# Patient Record
Sex: FEMALE | Race: WHITE | NOT HISPANIC OR LATINO | Employment: OTHER | URBAN - METROPOLITAN AREA
[De-identification: names, ages, dates, MRNs, and addresses within clinical notes are randomized per-mention and may not be internally consistent; named-entity substitution may affect disease eponyms.]

---

## 2017-02-08 ENCOUNTER — TELEPHONE (OUTPATIENT)
Dept: FAMILY MEDICINE | Facility: OTHER | Age: 57
End: 2017-02-08

## 2017-02-08 DIAGNOSIS — Z11.59 NEED FOR HEPATITIS C SCREENING TEST: ICD-10-CM

## 2017-02-08 DIAGNOSIS — E03.9 HYPOTHYROIDISM, UNSPECIFIED TYPE: Primary | ICD-10-CM

## 2017-02-08 NOTE — TELEPHONE ENCOUNTER
Reason for call:  Other  Reason for Call: Request for an order or referral:    Order or referral being requested: lab orders for annual lab work.  Needs TSH too.     Date needed: as soon as possible    Has the patient been seen by the PCP for this problem? YES    Additional comments: patient scheduled for her annual exam on 02-21-17 and would like to have her lab work done prior.       Phone number Patient can be reached at:  Home number on file [phone] (home) or Cell number on file:    Telephone Information:   Mobile [phone]       Best Time:  any    Can we leave a detailed message on this number?  YES    Call taken on 2/8/2017 at 7:06 AM by Enma James

## 2017-02-13 ENCOUNTER — HOSPITAL ENCOUNTER (OUTPATIENT)
Dept: MAMMOGRAPHY | Facility: CLINIC | Age: 57
Discharge: HOME | End: 2017-02-13
Attending: FAMILY MEDICINE | Admitting: FAMILY MEDICINE
Payer: COMMERCIAL

## 2017-02-13 DIAGNOSIS — E03.9 HYPOTHYROIDISM, UNSPECIFIED TYPE: ICD-10-CM

## 2017-02-13 DIAGNOSIS — Z12.31 VISIT FOR SCREENING MAMMOGRAM: ICD-10-CM

## 2017-02-13 LAB — TSH SERPL DL<=0.005 MIU/L-ACNC: 1.67 MU/L (ref 0.4–4)

## 2017-02-13 PROCEDURE — 84443 ASSAY THYROID STIM HORMONE: CPT | Performed by: FAMILY MEDICINE

## 2017-02-13 PROCEDURE — G0202 SCR MAMMO BI INCL CAD: HCPCS

## 2017-02-13 PROCEDURE — 36415 COLL VENOUS BLD VENIPUNCTURE: CPT | Performed by: FAMILY MEDICINE

## 2017-02-21 NOTE — PROGRESS NOTES
SUBJECTIVE:     CC: Umm Claros is an 56 year old woman who presents for preventive health visit.     Physical   Annual:     Getting at least 3 servings of Calcium per day::  Yes    Bi-annual eye exam::  Yes    Dental care twice a year::  Yes    Sleep apnea or symptoms of sleep apnea::  None    Diet::  Regular (no restrictions)    Frequency of exercise::  2-3 days/week    Duration of exercise::  45-60 minutes    Taking medications regularly::  Yes    Medication side effects::  None    Additional concerns today::  No        -------------------------------------    Today's PHQ-2 Score:   PHQ-2 ( 1999 [employer]) 3/14/2016   Q1: Little interest or pleasure in doing things 0   Q2: Feeling down, depressed or hopeless 0   PHQ-2 Score 0       Abuse: Current or Past(Physical, Sexual or Emotional)- No  Do you feel safe in your environment - Yes    Social History   Substance Use Topics     Smoking status: Former Smoker     Years: 7.00     Quit date: 4/1/1993     Smokeless tobacco: Never Used     Alcohol use Yes      Comment: 3-5 drinks per week     on occ    Recent Labs   Lab Test  11/21/16   0859  02/16/12   0813   CHOL  191  181   HDL  98  83   LDL  82  82   TRIG  56  78   CHOLHDLRATIO   --   2.0   NHDL  93   --        Reviewed orders with patient.  Reviewed health maintenance and updated orders accordingly - Yes    Mammo Decision Support:  Patient over age 50, mutual decision to screen reflected in health maintenance.    Pertinent mammograms are reviewed under the imaging tab.  History of abnormal Pap smear: NO - age 30-65 PAP every 5 years with negative HPV co-testing recommended  All Histories reviewed and updated in Epic.    Past Medical History   Diagnosis Date     Anxiety state, unspecified      Depressive disorder, not elsewhere classified      depression     Raynaud's syndrome      Tobacco use disorder      Unspecified hypothyroidism       Past Surgical History   Procedure Laterality Date     Hc removal of  ovary/tube(s)  01/30/01     Laparoscopic left sided salpingo-oophorectomy. Cystoscopy     C appendectomy  05/26/00     Laparoscopic drainage of left ovarian cyst and lysis of adhesions, dilatation and curettage, and laparoscopic appendectomy performed by Dr. Ryan      remove tonsils/adenoids,<11 y/o       T & A <12y.o.     Colonoscopy  4/10/2012     Procedure:COLONOSCOPY; Colonoscopy; Surgeon:BELL LAYTON; Location:[facility]       ROS:  Review of Systems   Constitutional: Negative.    HENT: Negative.    Eyes: Negative.    Respiratory: Negative.    Cardiovascular: Negative.    Gastrointestinal: Negative.    Endocrine: Negative.    Genitourinary: Negative.    Musculoskeletal: Negative.    Skin: Negative.    Neurological: Negative.    Hematological: Negative.    Psychiatric/Behavioral: Negative.          Problem list, Medication list, Allergies, and Medical/Social/Surgical histories reviewed in [facility] and updated as appropriate.  Labs reviewed in EPIC  BP Readings from Last 3 Encounters:   02/23/17 110/72   09/29/16 105/73   03/14/16 108/60    Wt Readings from Last 3 Encounters:   02/23/17 115 lb 3.2 oz (52.3 kg)   09/29/16 115 lb (52.2 kg)   03/14/16 116 lb (52.6 kg)                  Patient Active Problem List   Diagnosis     Hypothyroidism     Intradermal nevus     Osteopenia of prematurity     Chronic constipation     Diverticulitis of colon     Past Surgical History   Procedure Laterality Date     Hc removal of ovary/tube(s)  01/30/01     Laparoscopic left sided salpingo-oophorectomy. Cystoscopy     C appendectomy  05/26/00     Laparoscopic drainage of left ovarian cyst and lysis of adhesions, dilatation and curettage, and laparoscopic appendectomy performed by Dr. Ryan      remove tonsils/adenoids,<11 y/o       T & A <12y.o.     Colonoscopy  4/10/2012     Procedure:COLONOSCOPY; Colonoscopy; Surgeon:BELL LAYTON; Location: [facility]       Social History   Substance Use Topics     Smoking status: Former  "Smoker     Years: 7.00     Quit date: 4/1/1993     Smokeless tobacco: Never Used     Alcohol use Yes      Comment: 3-5 drinks per week     Family History   Problem Relation Age of Onset     CANCER Paternal Grandmother      breast cancer     Gynecology Mother      endometriosis     CANCER Father      prostate cancer     Alcohol/Drug Father      alcohol problems and smoker         Current Outpatient Prescriptions   Medication Sig Dispense Refill     amoxicillin-clavulanate (AUGMENTIN) 500-125 MG per tablet Take 1 tablet by mouth 3 times daily 30 tablet 0     levothyroxine (SYNTHROID) 88 MCG tablet Take 1 tablet (88 mcg) by mouth every morning 90 tablet 3     Acetaminophen (TYLENOL PO)        vitamin E 400 UNIT capsule Two times weekly 30 capsule 0     docusate sodium 100 MG tablet Take 100 mg by mouth daily. 60 tablet 1     magnesium hydroxide (MILK OF MAGNESIA) 400 MG/5ML suspension Take 30-60 mLs by mouth daily as needed for constipation. 360 mL 1     IBUPROFEN PO Take 200 mg by mouth as needed.       aspirin 81 MG EC tablet Take 81 mg by mouth daily.       fish oil-omega-3 fatty acids (FISH OIL) 1000 MG capsule Take 1 g by mouth daily.       Magnesium 250 MG tablet Take 1 tablet by mouth daily.       MULTIPLE VITAMIN CAPS   OR daily  0     CALCIUM /VITAMIN D TABS   OR 1 TABLET DAILY 0 0     VITAMIN C TABS 500 MG OR 1 TABLET TWICE DAILY 0 0     Allergies   Allergen Reactions     No Known Allergies      OBJECTIVE:     LMP 04/13/2001  EXAM:   /72  Pulse 76  Temp 98.8  F (37.1  C) (Temporal)  Resp 12  Ht 5' 3.15\" (1.604 m)  Wt 115 lb 3.2 oz (52.3 kg)  LMP 04/13/2001  SpO2 99%  BMI 20.31 kg/m2  Physical Exam   Constitutional: She is oriented to person, place, and time. She appears well-developed and well-nourished.   HENT:   Head: Normocephalic and atraumatic.   Right Ear: External ear normal.   Left Ear: External ear normal.   Mouth/Throat: Oropharynx is clear and moist.   Eyes: EOM are normal.   Neck: Neck " "supple.   Cardiovascular: Normal rate and regular rhythm.    Pulmonary/Chest: Effort normal and breath sounds normal.   Abdominal: Soft. Bowel sounds are normal.   Musculoskeletal: Normal range of motion.   Neurological: She is alert and oriented to person, place, and time.   Psychiatric: She has a normal mood and affect.     ASSESSMENT/PLAN:       Problem List Items Addressed This Visit     Hypothyroidism     Well controlled  Last TSH is well controlled           Relevant Medications    levothyroxine (SYNTHROID) 88 MCG tablet    Osteopenia of prematurity     Discussed calcium and vitamin D         Diverticulitis of colon     Has had recurrent diverticulitis  Will send standing orders for abx in case of any episode  She will notify clinic promptly when she starts using them         Relevant Medications    amoxicillin-clavulanate (AUGMENTIN) 500-125 MG per tablet      Other Visit Diagnoses     Encounter for routine adult health examination without abnormal findings    -  Primary    Need for hepatitis C screening test                COUNSELING:  Reviewed preventive health counseling, as reflected in patient instructions       Regular exercise       Healthy diet/nutrition       Vision screening         reports that she quit smoking about 23 years ago. She quit after 7.00 years of use. She has never used smokeless tobacco.    Estimated body mass index is 19.63 kg/(m^2) as calculated from the following:    Height as of 3/14/16: 5' 4.17\" (1.63 m).    Weight as of 9/29/16: 115 lb (52.2 kg).       Counseling Resources:  ATP IV Guidelines  Pooled Cohorts Equation Calculator  Breast Cancer Risk Calculator  FRAX Risk Assessment  ICSI Preventive Guidelines  Dietary Guidelines for Americans, 2010  USDA's MyPlate  ASA Prophylaxis  Lung CA Screening    Lindsay Padgett MD  [facility]  Answers for HPI/ROS submitted by the patient on 2/22/2017   PHQ-2 Depressed: Not at all, Not at all  PHQ-2 Score: 0      "

## 2017-02-23 ENCOUNTER — OFFICE VISIT (OUTPATIENT)
Dept: FAMILY MEDICINE | Facility: OTHER | Age: 57
End: 2017-02-23
Payer: COMMERCIAL

## 2017-02-23 VITALS
TEMPERATURE: 98.8 F | OXYGEN SATURATION: 99 % | DIASTOLIC BLOOD PRESSURE: 72 MMHG | WEIGHT: 115.2 LBS | HEART RATE: 76 BPM | RESPIRATION RATE: 12 BRPM | BODY MASS INDEX: 20.41 KG/M2 | SYSTOLIC BLOOD PRESSURE: 110 MMHG | HEIGHT: 63 IN

## 2017-02-23 DIAGNOSIS — M85.80 OSTEOPENIA OF PREMATURITY: ICD-10-CM

## 2017-02-23 DIAGNOSIS — E03.9 HYPOTHYROIDISM, UNSPECIFIED TYPE: ICD-10-CM

## 2017-02-23 DIAGNOSIS — Z00.00 ENCOUNTER FOR ROUTINE ADULT HEALTH EXAMINATION WITHOUT ABNORMAL FINDINGS: Primary | ICD-10-CM

## 2017-02-23 DIAGNOSIS — Z11.59 NEED FOR HEPATITIS C SCREENING TEST: ICD-10-CM

## 2017-02-23 DIAGNOSIS — K57.32 DIVERTICULITIS OF COLON: ICD-10-CM

## 2017-02-23 PROCEDURE — 99396 PREV VISIT EST AGE 40-64: CPT | Performed by: FAMILY MEDICINE

## 2017-02-23 PROCEDURE — 36415 COLL VENOUS BLD VENIPUNCTURE: CPT | Performed by: FAMILY MEDICINE

## 2017-02-23 PROCEDURE — 86803 HEPATITIS C AB TEST: CPT | Performed by: FAMILY MEDICINE

## 2017-02-23 RX ORDER — AMOXICILLIN AND CLAVULANATE POTASSIUM 500; 125 MG/1; MG/1
1 TABLET, FILM COATED ORAL 3 TIMES DAILY
Qty: 30 TABLET | Refills: 0 | Status: SHIPPED | OUTPATIENT
Start: 2017-02-23 | End: 2017-02-23

## 2017-02-23 RX ORDER — AMOXICILLIN AND CLAVULANATE POTASSIUM 500; 125 MG/1; MG/1
1 TABLET, FILM COATED ORAL 3 TIMES DAILY
Qty: 30 TABLET | Refills: 0 | Status: SHIPPED | OUTPATIENT
Start: 2017-02-23 | End: 2017-08-03

## 2017-02-23 RX ORDER — LEVOTHYROXINE SODIUM 88 UG/1
88 TABLET ORAL EVERY MORNING
Qty: 90 TABLET | Refills: 3 | Status: SHIPPED | OUTPATIENT
Start: 2017-02-23 | End: 2018-03-17

## 2017-02-23 ASSESSMENT — PAIN SCALES - GENERAL: PAINLEVEL: NO PAIN (0)

## 2017-02-23 NOTE — ASSESSMENT & PLAN NOTE
Has had recurrent diverticulitis  Will send standing orders for abx in case of any episode  She will notify clinic promptly when she starts using them

## 2017-02-23 NOTE — MR AVS SNAPSHOT
"              After Visit Summary   2/23/2017    Umm Claros    MRN: [id number]           Patient Information     Date Of Birth          1960        Visit Information        Provider Department      2/23/2017 9:40 AM Lindsay Padgett MD [facility]        Today's Diagnoses     Diverticulitis of colon    -  1    Hypothyroidism, unspecified type           Follow-ups after your visit        Follow-up notes from your care team     Return for Physical Exam.      Who to contact     If you have questions or need follow up information about today's clinic visit or your schedule please contact [facility] directly at [phone].  Normal or non-critical lab and imaging results will be communicated to you by MyChart, letter or phone within 4 business days after the clinic has received the results. If you do not hear from us within 7 days, please contact the clinic through [employer] or phone. If you have a critical or abnormal lab result, we will notify you by phone as soon as possible.  Submit refill requests through [employer]wu or call your pharmacy and they will forward the refill request to us. Please allow 3 business days for your refill to be completed.          Additional Information About Your Visit        MyChart Information     [employer]wu gives you secure access to your electronic health record. If you see a primary care provider, you can also send messages to your care team and make appointments. If you have questions, please call your primary care clinic.  If you do not have a primary care provider, please call [phone] and they will assist you.        Care EveryWhere ID     This is your Care EveryWhere ID. This could be used by other organizations to access your [facility] medical records  [id number]        Your Vitals Were     Pulse Temperature Respirations Height Last Period Pulse Oximetry    76 98.8  F (37.1  C) (Temporal) 12 5' 3.15\" (1.604 m) 04/13/2001 99%    BMI (Body " Mass Index)                   20.31 kg/m2            Blood Pressure from Last 3 Encounters:   02/23/17 110/72   09/29/16 105/73   03/14/16 108/60    Weight from Last 3 Encounters:   02/23/17 115 lb 3.2 oz (52.3 kg)   09/29/16 115 lb (52.2 kg)   03/14/16 116 lb (52.6 kg)              Today, you had the following     No orders found for display         Today's Medication Changes          These changes are accurate as of: 2/23/17 10:41 AM.  If you have any questions, ask your nurse or doctor.               Start taking these medicines.        Dose/Directions    amoxicillin-clavulanate 500-125 MG per tablet   Commonly known as:  AUGMENTIN   Used for:  Diverticulitis of colon   Started by:  Lindsay Padgett MD        Dose:  1 tablet   Take 1 tablet by mouth 3 times daily   Quantity:  30 tablet   Refills:  0            Where to get your medicines      These medications were sent to [facility] [address]   [address] , [facility] [address]     Phone:  [phone]     levothyroxine 88 MCG tablet         These medications were sent to [facility] [address] [place] [address] [address] [facility] [address]  [address] [facility] [address]     Phone:  [phone]     amoxicillin-clavulanate 500-125 MG per tablet                Primary Care Provider Office Phone # Fax #    Lindsay Padgett [phone]       [facility] [address]    [facility] [address]        Thank you!     Thank you for choosing [facility]  for your care. Our goal is always to provide you with excellent care. Hearing back from our patients is one way we can continue to improve our services. Please take a few minutes to complete the written survey that you may receive in the mail after your visit with us. Thank you!             Your Updated Medication List - Protect others around you: Learn how to safely use, store and throw away  your medicines at www.disposemymeds.org.          This list is accurate as of: 2/23/17 10:41 AM.  Always use your most recent med list.                   Brand Name Dispense Instructions for use    amoxicillin-clavulanate 500-125 MG per tablet    AUGMENTIN    30 tablet    Take 1 tablet by mouth 3 times daily       ascorbic acid 500 MG tablet    VITAMIN C    0    1 TABLET TWICE DAILY       aspirin 81 MG EC tablet      Take 81 mg by mouth daily.       CALCIUM /VITAMIN D TABS   OR     0    1 TABLET DAILY       docusate sodium 100 MG tablet    COLACE    60 tablet    Take 100 mg by mouth daily.       fish oil-omega-3 fatty acids 1000 MG capsule      Take 1 g by mouth daily.       IBUPROFEN PO      Take 200 mg by mouth as needed.       levothyroxine 88 MCG tablet    SYNTHROID    90 tablet    Take 1 tablet (88 mcg) by mouth every morning       magnesium 250 MG tablet      Take 1 tablet by mouth daily.       MILK OF MAGNESIA 400 MG/5ML suspension   Generic drug:  magnesium hydroxide     360 mL    Take 30-60 mLs by mouth daily as needed for constipation.       Multiple vitamin Caps      daily       TYLENOL PO          vitamin E 400 UNIT capsule     30 capsule    Two times weekly

## 2017-02-23 NOTE — NURSING NOTE
"Chief Complaint   Patient presents with     Physical     Panel Management     flu, honoring choices, hep C       Initial /72  Pulse 76  Temp 98.8  F (37.1  C) (Temporal)  Resp 12  Ht 5' 3.15\" (1.604 m)  Wt 115 lb 3.2 oz (52.3 kg)  LMP 04/13/2001  SpO2 99%  BMI 20.31 kg/m2 Estimated body mass index is 20.31 kg/(m^2) as calculated from the following:    Height as of this encounter: 5' 3.15\" (1.604 m).    Weight as of this encounter: 115 lb 3.2 oz (52.3 kg).  Medication Reconciliation: complete  Fiorella Bonds CMA (AAMA)    "

## 2017-02-24 LAB — HCV AB SERPL QL IA: NORMAL

## 2017-02-27 ASSESSMENT — ENCOUNTER SYMPTOMS
EYES NEGATIVE: 1
ENDOCRINE NEGATIVE: 1
MUSCULOSKELETAL NEGATIVE: 1
RESPIRATORY NEGATIVE: 1
CARDIOVASCULAR NEGATIVE: 1
HEMATOLOGIC/LYMPHATIC NEGATIVE: 1
CONSTITUTIONAL NEGATIVE: 1
GASTROINTESTINAL NEGATIVE: 1
NEUROLOGICAL NEGATIVE: 1
PSYCHIATRIC NEGATIVE: 1

## 2017-03-07 DIAGNOSIS — E03.9 HYPOTHYROIDISM: ICD-10-CM

## 2017-03-07 NOTE — TELEPHONE ENCOUNTER
Levothyroxine 88 mcg     Last Written Prescription Date: 02/23/2017  Last Quantity: 90, # refills: 3  Last Office Visit with G, P or [facility] prescribing provider: 2/23/2017        TSH   Date Value Ref Range Status   02/13/2017 1.67 0.40 - 4.00 mU/L Final

## 2017-03-08 RX ORDER — LEVOTHYROXINE SODIUM 88 UG/1
TABLET ORAL
Qty: 90 TABLET | Refills: 3 | OUTPATIENT
Start: 2017-03-08

## 2017-07-31 ENCOUNTER — DOCUMENTATION ONLY (OUTPATIENT)
Dept: OTHER | Facility: CLINIC | Age: 57
End: 2017-07-31

## 2017-07-31 PROBLEM — Z71.89 ADVANCED DIRECTIVES, COUNSELING/DISCUSSION: Chronic | Status: ACTIVE | Noted: 2017-07-31

## 2017-07-31 NOTE — PROGRESS NOTES
SUBJECTIVE:                                                    Umm Claros is a 56 year old female who presents to clinic today for the following health issues:      HPI    Concern - Diverticulitis  Onset: Since last September    Description:   diverticulitis    Intensity: mild    Progression of Symptoms:  Improving with antibiotics    Accompanying Signs & Symptoms:  Upper back pain going down the leg    Previous history of similar problem:   Yes    Precipitating factors:   Worsened by: stress, sometimes nothing, certain foods, heavy meals    Alleviating factors:  Improved by: abx    Therapies Tried and outcome: Augmentin currently      Problem list and histories reviewed & adjusted, as indicated.  Additional history: as documented        Patient Active Problem List   Diagnosis     Hypothyroidism     Intradermal nevus     Osteopenia of prematurity     Chronic constipation     Diverticulitis of colon     Advance Care Planning     Past Surgical History:   Procedure Laterality Date     C APPENDECTOMY  05/26/00    Laparoscopic drainage of left ovarian cyst and lysis of adhesions, dilatation and curettage, and laparoscopic appendectomy performed by Dr. Ryan     COLONOSCOPY  4/10/2012    Procedure:COLONOSCOPY; Colonoscopy; Surgeon:BELL LAYTON; Location:[facility]     HC REMOVAL OF OVARY/TUBE(S)  01/30/01    Laparoscopic left sided salpingo-oophorectomy. Cystoscopy     HC REMOVE TONSILS/ADENOIDS,<11 Y/O      T & A <12y.o.       Social History   Substance Use Topics     Smoking status: Former Smoker     Years: 7.00     Quit date: 4/1/1993     Smokeless tobacco: Never Used     Alcohol use Yes      Comment: 3-5 drinks per week     Family History   Problem Relation Age of Onset     CANCER Paternal Grandmother      breast cancer     Gynecology Mother      endometriosis     CANCER Father      prostate cancer     Alcohol/Drug Father      alcohol problems and smoker         Current Outpatient Prescriptions   Medication  Sig Dispense Refill     amoxicillin-clavulanate (AUGMENTIN) 500-125 MG per tablet Take 1 tablet by mouth 3 times daily 30 tablet 0     levothyroxine (SYNTHROID) 88 MCG tablet Take 1 tablet (88 mcg) by mouth every morning 90 tablet 3     Acetaminophen (TYLENOL PO)        vitamin E 400 UNIT capsule Two times weekly 30 capsule 0     docusate sodium 100 MG tablet Take 100 mg by mouth daily. 60 tablet 1     magnesium hydroxide (MILK OF MAGNESIA) 400 MG/5ML suspension Take 30-60 mLs by mouth daily as needed for constipation. 360 mL 1     IBUPROFEN PO Take 200 mg by mouth as needed.       aspirin 81 MG EC tablet Take 81 mg by mouth daily.       fish oil-omega-3 fatty acids (FISH OIL) 1000 MG capsule Take 1 g by mouth daily.       Magnesium 250 MG tablet Take 1 tablet by mouth daily.       MULTIPLE VITAMIN CAPS   OR daily  0     CALCIUM /VITAMIN D TABS   OR 1 TABLET DAILY 0 0     VITAMIN C TABS 500 MG OR 1 TABLET TWICE DAILY 0 0     Allergies   Allergen Reactions     No Known Allergies      Tramadol      Other reaction(s): Other - Describe In Comment Field  Dizzy and double vision     BP Readings from Last 3 Encounters:   08/03/17 120/68   02/23/17 110/72   09/29/16 105/73    Wt Readings from Last 3 Encounters:   08/03/17 114 lb (51.7 kg)   02/23/17 115 lb 3.2 oz (52.3 kg)   09/29/16 115 lb (52.2 kg)                  Labs reviewed in EPIC        ROS:  Constitutional, HEENT, cardiovascular, pulmonary, GI, , musculoskeletal, neuro, skin, endocrine and psych systems are negative, except as otherwise noted.      OBJECTIVE:   /68 (BP Location: Right arm, Patient Position: Chair, Cuff Size: Adult Regular)  Pulse 61  Temp 97.4  F (36.3  C) (Temporal)  Resp 16  Wt 114 lb (51.7 kg)  LMP 04/13/2001  SpO2 100%  BMI 20.1 kg/m2  Body mass index is 20.1 kg/(m^2).   Physical Exam   Constitutional: She is oriented to person, place, and time. She appears well-developed and well-nourished.   HENT:   Head: Normocephalic and  atraumatic.   Eyes: EOM are normal.   Neck: Neck supple.   Cardiovascular: Normal rate, regular rhythm and normal heart sounds.    Pulmonary/Chest: Effort normal and breath sounds normal.   Abdominal: Soft. Bowel sounds are normal. She exhibits no distension and no mass. There is tenderness. There is no rebound and no guarding.   Neurological: She is alert and oriented to person, place, and time.   Psychiatric: She has a normal mood and affect.         Diagnostic Test Results:  none     ASSESSMENT/PLAN:     Problem List Items Addressed This Visit     Diverticulitis of colon     She has had 4 episodes of diverticulitis so far and has been worried about having a complication from it  Symptoms have improved since starting the standing order for augmentin  Refer to GI per Pt's request to discuss her options for further treatment to prevent these attacks  Discussed dietary modifications   Discussed home care  Reportable signs and symptoms discussed  RTC if symptoms persist or fail to improve           Relevant Medications    amoxicillin-clavulanate (AUGMENTIN) 500-125 MG per tablet      Other Visit Diagnoses     Diverticulitis of large intestine without perforation or abscess without bleeding    -  Primary    Relevant Medications    amoxicillin-clavulanate (AUGMENTIN) 500-125 MG per tablet    Other Relevant Orders    GASTROENTEROLOGY ADULT REF CONSULT ONLY           Lindsay Padgett MD  [facility]

## 2017-08-03 ENCOUNTER — OFFICE VISIT (OUTPATIENT)
Dept: FAMILY MEDICINE | Facility: OTHER | Age: 57
End: 2017-08-03
Payer: COMMERCIAL

## 2017-08-03 VITALS
WEIGHT: 114 LBS | SYSTOLIC BLOOD PRESSURE: 120 MMHG | HEART RATE: 61 BPM | RESPIRATION RATE: 16 BRPM | OXYGEN SATURATION: 100 % | DIASTOLIC BLOOD PRESSURE: 68 MMHG | BODY MASS INDEX: 20.1 KG/M2 | TEMPERATURE: 97.4 F

## 2017-08-03 DIAGNOSIS — K57.32 DIVERTICULITIS OF COLON: ICD-10-CM

## 2017-08-03 DIAGNOSIS — K57.32 DIVERTICULITIS OF LARGE INTESTINE WITHOUT PERFORATION OR ABSCESS WITHOUT BLEEDING: Primary | ICD-10-CM

## 2017-08-03 PROCEDURE — 99214 OFFICE O/P EST MOD 30 MIN: CPT | Performed by: FAMILY MEDICINE

## 2017-08-03 RX ORDER — AMOXICILLIN AND CLAVULANATE POTASSIUM 500; 125 MG/1; MG/1
1 TABLET, FILM COATED ORAL 3 TIMES DAILY
Qty: 30 TABLET | Refills: 0 | Status: SHIPPED | OUTPATIENT
Start: 2017-08-03 | End: 2017-11-06

## 2017-08-03 ASSESSMENT — PAIN SCALES - GENERAL: PAINLEVEL: NO PAIN (1)

## 2017-08-03 NOTE — ASSESSMENT & PLAN NOTE
She has had 4 episodes of diverticulitis so far and has been worried about having a complication from it  Symptoms have improved since starting the standing order for augmentin  Refer to GI per Pt's request to discuss her options for further treatment to prevent these attacks  Discussed dietary modifications   Discussed home care  Reportable signs and symptoms discussed  RTC if symptoms persist or fail to improve

## 2017-08-03 NOTE — NURSING NOTE
"Chief Complaint   Patient presents with     Gastrointestinal Problem     diverticulitis     Panel Management     UTD       Initial /68 (BP Location: Right arm, Patient Position: Chair, Cuff Size: Adult Regular)  Pulse 61  Temp 97.4  F (36.3  C) (Temporal)  Resp 16  Wt 114 lb (51.7 kg)  LMP 04/13/2001  SpO2 100%  BMI 20.1 kg/m2 Estimated body mass index is 20.1 kg/(m^2) as calculated from the following:    Height as of 2/23/17: 5' 3.15\" (1.604 m).    Weight as of this encounter: 114 lb (51.7 kg).  Medication Reconciliation: complete     Marietta Ventura CMA      "

## 2017-08-03 NOTE — MR AVS SNAPSHOT
After Visit Summary   8/3/2017    Umm Claros    MRN: [id number]           Patient Information     Date Of Birth          1960        Visit Information        Provider Department      8/3/2017 11:00 AM Lindsay Padgett MD [facility]        Today's Diagnoses     Diverticulitis of large intestine without perforation or abscess without bleeding    -  1    Diverticulitis of colon           Follow-ups after your visit        Additional Services     GASTROENTEROLOGY ADULT REF CONSULT ONLY       Preferred Location: [facility] [place] [phone]      Please be aware that coverage of these services is subject to the terms and limitations of your health insurance plan.  Call member services at your health plan with any benefit or coverage questions.  Any procedures must be performed at a [place] facility OR coordinated by your clinic's referral office.    Please bring the following with you to your appointment:    (1) Any X-Rays, CTs or MRIs which have been performed.  Contact the facility where they were done to arrange for  prior to your scheduled appointment.    (2) List of current medications   (3) This referral request   (4) Any documents/labs given to you for this referral                  Who to contact     If you have questions or need follow up information about today's clinic visit or your schedule please contact [facility] directly at [phone].  Normal or non-critical lab and imaging results will be communicated to you by MyChart, letter or phone within 4 business days after the clinic has received the results. If you do not hear from us within 7 days, please contact the clinic through MyChart or phone. If you have a critical or abnormal lab result, we will notify you by phone as soon as possible.  Submit refill requests through [employer] or call your pharmacy and they will forward the refill request to us. Please allow 3 business days for your  refill to be completed.          Additional Information About Your Visit        MyChart Information     [employer] gives you secure access to your electronic health record. If you see a primary care provider, you can also send messages to your care team and make appointments. If you have questions, please call your primary care clinic.  If you do not have a primary care provider, please call [phone] and they will assist you.        Care EveryWhere ID     This is your Care EveryWhere ID. This could be used by other organizations to access your [place] medical records  [id number]        Your Vitals Were     Pulse Temperature Respirations Last Period Pulse Oximetry BMI (Body Mass Index)    61 97.4  F (36.3  C) (Temporal) 16 04/13/2001 100% 20.1 kg/m2       Blood Pressure from Last 3 Encounters:   08/03/17 120/68   02/23/17 110/72   09/29/16 105/73    Weight from Last 3 Encounters:   08/03/17 114 lb (51.7 kg)   02/23/17 115 lb 3.2 oz (52.3 kg)   09/29/16 115 lb (52.2 kg)              We Performed the Following     GASTROENTEROLOGY ADULT REF CONSULT ONLY          Where to get your medicines      These medications were sent to [facility] [place], [place] - [address]   [address] , [facility] [address]     Phone:  [phone]     amoxicillin-clavulanate 500-125 MG per tablet          Primary Care Provider Office Phone # Fax #    Lindsay Padgett [phone]       [address] [address]        Equal Access to Services     [facility] : Hadii jean-paul ku hadasho Soomaali, waaxda luqadaha, qaybta kaalmada adecooper, kenneth ma . So [facility] [phone].    ATENCIÓN: Si habla español, tiene a newman disposición servicios gratuitos de asistencia lingüística. Llame al [phone].    We comply with applicable federal civil rights laws and [place] laws. We do not discriminate on the basis of race, color, national  origin, age, disability sex, sexual orientation or gender identity.            Thank you!     Thank you for choosing [facility]  for your care. Our goal is always to provide you with excellent care. Hearing back from our patients is one way we can continue to improve our services. Please take a few minutes to complete the written survey that you may receive in the mail after your visit with us. Thank you!             Your Updated Medication List - Protect others around you: Learn how to safely use, store and throw away your medicines at www.disposemymeds.org.          This list is accurate as of: 8/3/17 11:38 AM.  Always use your most recent med list.                   Brand Name Dispense Instructions for use Diagnosis    amoxicillin-clavulanate 500-125 MG per tablet    AUGMENTIN    30 tablet    Take 1 tablet by mouth 3 times daily    Diverticulitis of colon       ascorbic acid 500 MG tablet    VITAMIN C    0    1 TABLET TWICE DAILY        aspirin 81 MG EC tablet      Take 81 mg by mouth daily.        CALCIUM /VITAMIN D TABS   OR     0    1 TABLET DAILY        docusate sodium 100 MG tablet    COLACE    60 tablet    Take 100 mg by mouth daily.    LLQ abdominal pain, Diverticulosis of colon       fish oil-omega-3 fatty acids 1000 MG capsule      Take 1 g by mouth daily.        IBUPROFEN PO      Take 200 mg by mouth as needed.        levothyroxine 88 MCG tablet    SYNTHROID    90 tablet    Take 1 tablet (88 mcg) by mouth every morning    Hypothyroidism, unspecified type       magnesium 250 MG tablet      Take 1 tablet by mouth daily.        MILK OF MAGNESIA 400 MG/5ML suspension   Generic drug:  magnesium hydroxide     360 mL    Take 30-60 mLs by mouth daily as needed for constipation.    LLQ abdominal pain, Diverticulosis of colon       Multiple vitamin Caps      daily        TYLENOL PO           vitamin E 400 UNIT capsule     30 capsule    Two times weekly

## 2017-08-07 ENCOUNTER — HOSPITAL ENCOUNTER (EMERGENCY)
Facility: CLINIC | Age: 57
Discharge: HOME | End: 2017-08-07
Attending: FAMILY MEDICINE | Admitting: FAMILY MEDICINE
Payer: COMMERCIAL

## 2017-08-07 ENCOUNTER — APPOINTMENT (OUTPATIENT)
Dept: CT IMAGING | Facility: CLINIC | Age: 57
End: 2017-08-07
Attending: FAMILY MEDICINE
Payer: COMMERCIAL

## 2017-08-07 VITALS
OXYGEN SATURATION: 99 % | SYSTOLIC BLOOD PRESSURE: 118 MMHG | RESPIRATION RATE: 18 BRPM | BODY MASS INDEX: 20.1 KG/M2 | WEIGHT: 114 LBS | DIASTOLIC BLOOD PRESSURE: 79 MMHG | HEART RATE: 75 BPM | TEMPERATURE: 98.4 F

## 2017-08-07 DIAGNOSIS — R10.9 LEFT LATERAL ABDOMINAL PAIN: ICD-10-CM

## 2017-08-07 DIAGNOSIS — K57.30 DIVERTICULOSIS OF LARGE INTESTINE WITHOUT HEMORRHAGE: ICD-10-CM

## 2017-08-07 DIAGNOSIS — K59.09 CHRONIC CONSTIPATION: ICD-10-CM

## 2017-08-07 LAB
ALBUMIN SERPL-MCNC: 4.1 G/DL (ref 3.4–5)
ALP SERPL-CCNC: 66 U/L (ref 40–150)
ALT SERPL W P-5'-P-CCNC: 19 U/L (ref 0–50)
ANION GAP SERPL CALCULATED.3IONS-SCNC: 8 MMOL/L (ref 3–14)
AST SERPL W P-5'-P-CCNC: 21 U/L (ref 0–45)
BASOPHILS # BLD AUTO: 0.1 10E9/L (ref 0–0.2)
BASOPHILS NFR BLD AUTO: 0.8 %
BILIRUB SERPL-MCNC: 0.6 MG/DL (ref 0.2–1.3)
BUN SERPL-MCNC: 15 MG/DL (ref 7–30)
CALCIUM SERPL-MCNC: 8.8 MG/DL (ref 8.5–10.1)
CHLORIDE SERPL-SCNC: 104 MMOL/L (ref 94–109)
CO2 SERPL-SCNC: 29 MMOL/L (ref 20–32)
CREAT SERPL-MCNC: 0.78 MG/DL (ref 0.52–1.04)
DIFFERENTIAL METHOD BLD: NORMAL
EOSINOPHIL # BLD AUTO: 0 10E9/L (ref 0–0.7)
EOSINOPHIL NFR BLD AUTO: 0.2 %
ERYTHROCYTE [DISTWIDTH] IN BLOOD BY AUTOMATED COUNT: 12 % (ref 10–15)
GFR SERPL CREATININE-BSD FRML MDRD: 76 ML/MIN/1.7M2
GLUCOSE SERPL-MCNC: 95 MG/DL (ref 70–99)
HCT VFR BLD AUTO: 45 % (ref 35–47)
HGB BLD-MCNC: 14.9 G/DL (ref 11.7–15.7)
IMM GRANULOCYTES # BLD: 0 10E9/L (ref 0–0.4)
IMM GRANULOCYTES NFR BLD: 0.2 %
LIPASE SERPL-CCNC: 217 U/L (ref 73–393)
LYMPHOCYTES # BLD AUTO: 1.3 10E9/L (ref 0.8–5.3)
LYMPHOCYTES NFR BLD AUTO: 19.3 %
MCH RBC QN AUTO: 32.1 PG (ref 26.5–33)
MCHC RBC AUTO-ENTMCNC: 33.1 G/DL (ref 31.5–36.5)
MCV RBC AUTO: 97 FL (ref 78–100)
MONOCYTES # BLD AUTO: 0.4 10E9/L (ref 0–1.3)
MONOCYTES NFR BLD AUTO: 6.2 %
NEUTROPHILS # BLD AUTO: 4.8 10E9/L (ref 1.6–8.3)
NEUTROPHILS NFR BLD AUTO: 73.3 %
PLATELET # BLD AUTO: 225 10E9/L (ref 150–450)
POTASSIUM SERPL-SCNC: 4.2 MMOL/L (ref 3.4–5.3)
PROT SERPL-MCNC: 7.9 G/DL (ref 6.8–8.8)
RBC # BLD AUTO: 4.64 10E12/L (ref 3.8–5.2)
SODIUM SERPL-SCNC: 141 MMOL/L (ref 133–144)
WBC # BLD AUTO: 6.5 10E9/L (ref 4–11)

## 2017-08-07 PROCEDURE — 99285 EMERGENCY DEPT VISIT HI MDM: CPT | Mod: Z6 | Performed by: FAMILY MEDICINE

## 2017-08-07 PROCEDURE — 99285 EMERGENCY DEPT VISIT HI MDM: CPT | Mod: 25 | Performed by: FAMILY MEDICINE

## 2017-08-07 PROCEDURE — 96361 HYDRATE IV INFUSION ADD-ON: CPT | Performed by: FAMILY MEDICINE

## 2017-08-07 PROCEDURE — 85025 COMPLETE CBC W/AUTO DIFF WBC: CPT | Performed by: FAMILY MEDICINE

## 2017-08-07 PROCEDURE — 83690 ASSAY OF LIPASE: CPT | Performed by: FAMILY MEDICINE

## 2017-08-07 PROCEDURE — 25000125 ZZHC RX 250: Performed by: FAMILY MEDICINE

## 2017-08-07 PROCEDURE — 25000128 H RX IP 250 OP 636: Performed by: FAMILY MEDICINE

## 2017-08-07 PROCEDURE — 80053 COMPREHEN METABOLIC PANEL: CPT | Performed by: FAMILY MEDICINE

## 2017-08-07 PROCEDURE — 74177 CT ABD & PELVIS W/CONTRAST: CPT

## 2017-08-07 PROCEDURE — 96360 HYDRATION IV INFUSION INIT: CPT | Mod: 59 | Performed by: FAMILY MEDICINE

## 2017-08-07 RX ORDER — ONDANSETRON 2 MG/ML
4 INJECTION INTRAMUSCULAR; INTRAVENOUS EVERY 30 MIN PRN
Status: DISCONTINUED | OUTPATIENT
Start: 2017-08-07 | End: 2017-08-07 | Stop reason: HOSPADM

## 2017-08-07 RX ORDER — SODIUM CHLORIDE 9 MG/ML
1000 INJECTION, SOLUTION INTRAVENOUS CONTINUOUS
Status: DISCONTINUED | OUTPATIENT
Start: 2017-08-07 | End: 2017-08-07 | Stop reason: HOSPADM

## 2017-08-07 RX ORDER — HYDROMORPHONE HYDROCHLORIDE 1 MG/ML
0.5 INJECTION, SOLUTION INTRAMUSCULAR; INTRAVENOUS; SUBCUTANEOUS
Status: DISCONTINUED | OUTPATIENT
Start: 2017-08-07 | End: 2017-08-07 | Stop reason: HOSPADM

## 2017-08-07 RX ORDER — IOPAMIDOL 755 MG/ML
500 INJECTION, SOLUTION INTRAVASCULAR ONCE
Status: COMPLETED | OUTPATIENT
Start: 2017-08-07 | End: 2017-08-07

## 2017-08-07 RX ORDER — LIDOCAINE 40 MG/G
CREAM TOPICAL
Status: DISCONTINUED | OUTPATIENT
Start: 2017-08-07 | End: 2017-08-07 | Stop reason: HOSPADM

## 2017-08-07 RX ADMIN — SODIUM CHLORIDE 1000 ML: 9 INJECTION, SOLUTION INTRAVENOUS at 12:31

## 2017-08-07 RX ADMIN — SODIUM CHLORIDE 60 ML: 9 INJECTION, SOLUTION INTRAVENOUS at 13:14

## 2017-08-07 RX ADMIN — IOPAMIDOL 66 ML: 755 INJECTION, SOLUTION INTRAVENOUS at 13:14

## 2017-08-07 ASSESSMENT — ENCOUNTER SYMPTOMS
BACK PAIN: 1
NAUSEA: 0
FLANK PAIN: 1
HEMATURIA: 0
CHILLS: 1
ABDOMINAL PAIN: 1
VOMITING: 0
DYSURIA: 0
BLOOD IN STOOL: 0
TREMORS: 1
APPETITE CHANGE: 1
DIARRHEA: 1
FREQUENCY: 0
DIFFICULTY URINATING: 0

## 2017-08-07 NOTE — ED PROVIDER NOTES
History     Chief Complaint   Patient presents with     Abdominal Pain     The history is provided by the patient.     Umm Claros is a 56 year old female, with PMHx of diverticulitis, who presents to the ED with complaints of abdominal pain. The patient states that she has been on antibiotics for 10 days now for diverticulitis, but they do not seem to be helping her symptoms much. She reports that at her appointment on Thursday she felt like she was recovering, but now today she began not feeling well again. She endorses that she has been experiencing left abdominal/flank pain and also pain across her mid back below her ribs. The patient states that today she has been shaky and chilled along with some bowel cramping earlier this morning. She reports having some mild diarrhea today and being unable to eat what she wants, when she wants. She says that she has been eating less and less since these symptoms began approximately one month ago. She denies any nausea, vomiting, blood in stool, urinary symptoms and cardiac problems. The patient states that she has had her appendix and left ovary removed.     She was seen in clinic on August 3, 6 days ago, for follow up of diverticulitis. Here is part of that note:  Diverticulitis of colon        She has had 4 episodes of diverticulitis so far and has been worried about having a complication from it  Symptoms have improved since starting the standing order for augmentin  Refer to GI per Pt's request to discuss her options for further treatment to prevent these attacks  Discussed dietary modifications   Discussed home care  Reportable signs and symptoms discussed  RTC if symptoms persist or fail to improve        Triage Note:  Pt here with abdominal; pain .  Hx of diverticulitis, took abx for last two weeks but does not seem to be helping.     I have reviewed the Medications, Allergies, Past Medical and Surgical History, and Social History in the Epic system.    Patient  Active Problem List   Diagnosis     Hypothyroidism     Intradermal nevus     Osteopenia of prematurity     Chronic constipation     Diverticulitis of colon     Advance Care Planning     Past Medical History:   Diagnosis Date     Anxiety state, unspecified      Depressive disorder, not elsewhere classified     depression     Raynaud's syndrome      Tobacco use disorder      Unspecified hypothyroidism      Past Surgical History:   Procedure Laterality Date     C APPENDECTOMY  05/26/00    Laparoscopic drainage of left ovarian cyst and lysis of adhesions, dilatation and curettage, and laparoscopic appendectomy performed by Dr. Ryan     COLONOSCOPY  4/10/2012    Procedure:COLONOSCOPY; Colonoscopy; Surgeon:BELL LAYTON; Location:[facility]     HC REMOVAL OF OVARY/TUBE(S)  01/30/01    Laparoscopic left sided salpingo-oophorectomy. Cystoscopy     HC REMOVE TONSILS/ADENOIDS,<13 Y/O      T & A <12y.o.     Family History   Problem Relation Age of Onset     CANCER Paternal Grandmother      breast cancer     Gynecology Mother      endometriosis     CANCER Father      prostate cancer     Alcohol/Drug Father      alcohol problems and smoker     Social History   Substance Use Topics     Smoking status: Former Smoker     Years: 7.00     Quit date: 4/1/1993     Smokeless tobacco: Never Used     Alcohol use Yes      Comment: 3-5 drinks per week      Immunization History   Administered Date(s) Administered     Influenza (IIV3) 11/14/2001, 11/28/2003, 11/16/2005, 12/04/2006, 09/16/2009, 11/02/2012     Influenza Vaccine IM 3yrs+ 4 Valent IIV4 11/18/2014, 11/18/2015     Influenza Vaccine, 3 YRS +, IM (QUADRIVALENT W/PRESERVATIVES) 11/04/2013     Mantoux 09/16/2009     TD (ADULT, 7+) 06/20/1990, 10/03/2005     TDAP Vaccine (Adacel) 05/04/2015     Allergies   Allergen Reactions     No Known Allergies      Tramadol      Other reaction(s): Other - Describe In Comment Field  Dizzy and double vision     Current Outpatient Prescriptions    Medication Sig Dispense Refill     amoxicillin-clavulanate (AUGMENTIN) 500-125 MG per tablet Take 1 tablet by mouth 3 times daily 30 tablet 0     levothyroxine (SYNTHROID) 88 MCG tablet Take 1 tablet (88 mcg) by mouth every morning 90 tablet 3     Acetaminophen (TYLENOL PO)        vitamin E 400 UNIT capsule Two times weekly 30 capsule 0     docusate sodium 100 MG tablet Take 100 mg by mouth daily. 60 tablet 1     IBUPROFEN PO Take 200 mg by mouth as needed.       aspirin 81 MG EC tablet Take 81 mg by mouth daily.       fish oil-omega-3 fatty acids (FISH OIL) 1000 MG capsule Take 1 g by mouth daily.       Magnesium 250 MG tablet Take 1 tablet by mouth daily.       MULTIPLE VITAMIN CAPS   OR daily  0     CALCIUM /VITAMIN D TABS   OR 1 TABLET DAILY 0 0     VITAMIN C TABS 500 MG OR 1 TABLET TWICE DAILY 0 0     magnesium hydroxide (MILK OF MAGNESIA) 400 MG/5ML suspension Take 30-60 mLs by mouth daily as needed for constipation. 360 mL 1     Review of Systems   Constitutional: Positive for appetite change and chills.   Gastrointestinal: Positive for abdominal pain and diarrhea. Negative for blood in stool, nausea and vomiting.   Genitourinary: Positive for flank pain. Negative for difficulty urinating, dysuria, frequency, hematuria and urgency.   Musculoskeletal: Positive for back pain.   Neurological: Positive for tremors.   All other systems reviewed and are negative.    Physical Exam   BP: 121/90  Pulse: 91  Temp: 98.4  F (36.9  C)  Resp: 14  Weight: 51.7 kg (114 lb)    Physical Exam   Constitutional: She is oriented to person, place, and time. She appears well-developed and well-nourished.   HENT:   Head: Normocephalic and atraumatic.   Right Ear: External ear normal.   Left Ear: External ear normal.   Nose: Nose normal.   Mouth/Throat: Oropharynx is clear and moist.   Eyes: Conjunctivae and EOM are normal.   Neck: Normal range of motion. Neck supple.   Cardiovascular: Normal rate, regular rhythm, normal heart  sounds and intact distal pulses.    No murmur heard.  Pulmonary/Chest: Effort normal and breath sounds normal. No respiratory distress. She has no wheezes. She has no rales.   Abdominal: Soft. Bowel sounds are normal. There is tenderness in the left upper quadrant. There is CVA tenderness.   Musculoskeletal: She exhibits no edema or tenderness.   Neurological: She is alert and oriented to person, place, and time.   Skin: Skin is warm and dry. No rash noted. No erythema.   Psychiatric: She has a normal mood and affect. Her behavior is normal. Judgment and thought content normal.   Nursing note and vitals reviewed.    ED Course     ED Course     Procedures      Results for orders placed or performed during the hospital encounter of 08/07/17 (from the past 24 hour(s))   CBC with platelets differential   Result Value Ref Range    WBC 6.5 4.0 - 11.0 10e9/L    RBC Count 4.64 3.8 - 5.2 10e12/L    Hemoglobin 14.9 11.7 - 15.7 g/dL    Hematocrit 45.0 35.0 - 47.0 %    MCV 97 78 - 100 fl    MCH 32.1 26.5 - 33.0 pg    MCHC 33.1 31.5 - 36.5 g/dL    RDW 12.0 10.0 - 15.0 %    Platelet Count 225 150 - 450 10e9/L    Diff Method Automated Method     % Neutrophils 73.3 %    % Lymphocytes 19.3 %    % Monocytes 6.2 %    % Eosinophils 0.2 %    % Basophils 0.8 %    % Immature Granulocytes 0.2 %    Absolute Neutrophil 4.8 1.6 - 8.3 10e9/L    Absolute Lymphocytes 1.3 0.8 - 5.3 10e9/L    Absolute Monocytes 0.4 0.0 - 1.3 10e9/L    Absolute Eosinophils 0.0 0.0 - 0.7 10e9/L    Absolute Basophils 0.1 0.0 - 0.2 10e9/L    Abs Immature Granulocytes 0.0 0 - 0.4 10e9/L   Comprehensive metabolic panel   Result Value Ref Range    Sodium 141 133 - 144 mmol/L    Potassium 4.2 3.4 - 5.3 mmol/L    Chloride 104 94 - 109 mmol/L    Carbon Dioxide 29 20 - 32 mmol/L    Anion Gap 8 3 - 14 mmol/L    Glucose 95 70 - 99 mg/dL    Urea Nitrogen 15 7 - 30 mg/dL    Creatinine 0.78 0.52 - 1.04 mg/dL    GFR Estimate 76 >60 mL/min/1.7m2    GFR Estimate If Black  >90   GFR Calc   >60 mL/min/1.7m2    Calcium 8.8 8.5 - 10.1 mg/dL    Bilirubin Total 0.6 0.2 - 1.3 mg/dL    Albumin 4.1 3.4 - 5.0 g/dL    Protein Total 7.9 6.8 - 8.8 g/dL    Alkaline Phosphatase 66 40 - 150 U/L    ALT 19 0 - 50 U/L    AST 21 0 - 45 U/L   Lipase   Result Value Ref Range    Lipase 217 73 - 393 U/L   CT Abdomen Pelvis w Contrast    Narrative    CT ABDOMEN AND PELVIS WITH CONTRAST   8/7/2017 1:26 PM     HISTORY: Left-sided abdominal pain.    TECHNIQUE:  CT abdomen and pelvis with 66 mL, Isovue-370 IV. Radiation  dose for this scan was reduced using automated exposure control,  adjustment of the mA and/or kV according to patient size, or iterative  reconstruction technique.    COMPARISON: CT abdomen and pelvis 9/29/2016.    FINDINGS: Distal colonic diverticulosis without convincing  diverticulitis. No bowel obstruction. Small fluid distends multiple  small bowel loops. Appendix not clearly seen. There is some surgical  changes in the region of the cecum. There is small free pelvic fluid.  Ill-defined uterine calcifications could represent underlying uterine  fibroids that are difficult to see, stable. Normal abdominal aorta.  Liver, gallbladder, adrenals, spleen, pancreas, and kidneys do not  show any acute abnormalities. There are a few tiny hypodensities in  the liver that are too small for characterization but appear to be  stable. This could just be small cysts.      Impression    IMPRESSION:  1. No convincing acute abnormality is identified.  2. Distal colonic diverticulosis, but no focal diverticulitis is  clearly demonstrated.  3. A few tiny hypodensities in the liver are unchanged and are too  small for characterization. A few of these could be cysts.       Medications   lidocaine 1 % 1 mL (not administered)   lidocaine (LMX4) kit (not administered)   sodium chloride (PF) 0.9% PF flush 3 mL (not administered)   sodium chloride (PF) 0.9% PF flush 3 mL (not administered)   0.9%  sodium chloride BOLUS (0 mLs Intravenous Stopped 8/7/17 1421)     Followed by   0.9% sodium chloride infusion (not administered)   ondansetron (ZOFRAN) injection 4 mg (not administered)   HYDROmorphone (PF) (DILAUDID) injection 0.5 mg (not administered)   sodium chloride (PF) 0.9% PF flush 3 mL (3 mLs Intravenous Given 8/7/17 1314)   iopamidol (ISOVUE-370) solution 500 mL (66 mLs Intravenous Given 8/7/17 1314)   sodium chloride 0.9 % for CT scan flush dose 500 mL (60 mLs Intravenous Given 8/7/17 1314)     Assessments & Plan (with Medical Decision Making)  Umm is a 56-year-old female with left-sided abdominal pain.  She has a known history of diverticulosis and has a standing order for antibiotics when she has symptoms of diverticulitis.  She recently started her Augmentin for diverticulitis and is on day 10 of medication.  She has not been feeling any better and came in for evaluation.  No chills or fever.  No nausea or vomiting.  Vital signs on arrival were normal.  Her exam shows left-sided discomfort with palpation, no rash on the skin and normal bowel sounds.  Heart is regular in rate and rhythm and she has normal distal pulses.  The patient does not appear toxic or ill.  Her labs today show normal white count, and in fact her CBC, CMP and lipase are completely normal.  CT scan of the abdomen and pelvis shows no convincing abnormality although they do notice distal colonic diverticulosis with no evidence of diverticulitis.  I went back and spoke to the patient about this.  I do not see any serious or life-threatening conditions and do not see any evidence of new serious disease.  She was asking questions about things like colon cancer or lymphoma, and we do not see evidence of that.  Her last colonoscopy was 5 years ago and they gave her a recommendation for 10 years of follow-up.  At this point I think the patient is safe to be discharged from the ED and I do not think further workup in the emergency  department is necessary.  The patient does have a GI consult set up, but was not able to get an appointment here in [place] until October.  She is going to call them back and see if she could travel to [place] for an earlier appointment.  I did not make any changes to her home medications and she had no prescriptions leaving the emergency department.       I have reviewed the nursing notes.    I have reviewed the findings, diagnosis, plan and need for follow up with the patient.       New Prescriptions    No medications on file       Final diagnoses:   Left lateral abdominal pain   Chronic constipation   Diverticulosis of large intestine without hemorrhage     This document serves as a record of services personally performed by German Lambert MD. It was created on their behalf by Cookie Cleary, a trained medical scribe. The creation of this record is based on the provider's personal observations and the statements of the patient. This document has been checked and approved by the attending provider.    Note: Chart documentation done in part with Dragon Voice Recognition software. Although reviewed after completion, some word and grammatical errors may remain.    8/7/2017   [facility] EMERGENCY DEPARTMENT     German Lambert MD  08/07/17 6920

## 2017-08-07 NOTE — ED AVS SNAPSHOT
[facility] Emergency Department    [address] [place] [address]    Phone:  [phone]    Fax:  [phone]                                       Umm Claros   MRN: [id number]    Department:  [facility] Emergency Department   Date of Visit:  8/7/2017           After Visit Summary Signature Page     I have received my discharge instructions, and my questions have been answered. I have discussed any challenges I see with this plan with the nurse or doctor.    ..........................................................................................................................................  Patient/Patient Representative Signature      ..........................................................................................................................................  Patient Representative Print Name and Relationship to Patient    ..................................................               ................................................  Date                                            Time    ..........................................................................................................................................  Reviewed by Signature/Title    ...................................................              ..............................................  Date                                                            Time

## 2017-08-07 NOTE — ED NOTES
Pt here with abdominal; pain .  Hx of diverticulitis, took abx for last two weeks but does not seem to be helping.

## 2017-08-07 NOTE — ED NOTES
No new complaints.  Waiting for CT results, MD asencio, dispo plan.  No adverse response to contrast.

## 2017-08-07 NOTE — ED NOTES
Pt presents to ED w/hx of pancreatitis and constipation issues.  She is on 10 days of abx and was doing better, but pain began to start back up yesterday and worse today.  Pain has never been totally gone.  She is on augmentin and has had it in the past, but cipro/flagyl has been used in the past.  Looser stools/on probiotics.  Denies any n/v.  Pain is 3/10 at rest-achy crampy.

## 2017-08-07 NOTE — DISCHARGE INSTRUCTIONS
*Abdominal Pain, Unknown Cause (Female)    The exact cause of your abdominal (stomach) pain is not certain. This does not mean that this is something to worry about, or the right tests were not done. Everyone likes to know the exact cause of the problem, but sometimes with abdominal pain, there is no clear-cut cause, and this could be a good thing. The good news is that your symptoms can be treated, and you will feel better.   Your condition does not seem serious now; however, sometimes the signs of a serious problem may take more time to appear. For this reason, it is important for you to watch for any new symptoms, problems, or worsening of your condition.  Over the next few days, the abdominal pain may come and go, or be continuous. Other common symptoms can include nausea and vomiting. Sometimes it can be difficult to tell if you feel nauseous, you may just feel bad and not associate that feeling with nausea. Constipation, diarrhea, and a fever may go along with the pain.  The pain may continue even if treated correctly over the following days. Depending on how things go, sometimes the cause can become clear and may require further or different treatment. Additional evaluations, medications, or tests may be needed.  Home care  Your health care provider may prescribe medications for pain, symptoms, or an infection.  Follow the health care provider's instructions for taking these medications.  General care    Rest until your next exam. No strenuous activities.    Try to find positions that ease discomfort. A small pillow placed on the abdomen may help relieve pain.    Something warm on your abdomen (such as a heating pad) may help, but be careful not to burn yourself.  Diet    Do not force yourself to eat, especially if having cramps, vomiting, or diarrhea.    Water is important so you do not get dehydrated. Soup may also be good. Sports drinks may also help, especially if they are not too acidic. Make sure you  don't drink sugary drinks as this can make things worse. Take liquids in small amounts. Do not guzzle them.    Caffeine sometimes makes the pain and cramping worse.    Avoid dairy products if you have vomiting or diarrhea.    Don't eat large amounts at a time. Wait a few minutes between bites.    Eat a diet low in fiber (called a low-residue diet). Foods allowed include refined breads, white rice, fruit and vegetable juices without pulp, tender meats. These foods will pass more easily through the intestine.    Avoid fried or fatty foods, dairy, alcohol and spicy foods until your symptoms go away.  Follow-up care  Follow up with your health care provider as planned.  I agree with trying to get into  GI physician sooner, if possible.   When to seek medical care  Seek prompt medical care if any of the following occur:    Pain gets worse or moves to the right lower abdomen    New or worsening vomiting or diarrhea    Swelling of the abdomen    Unable to pass stool for more than three days    New fever over 101  F (38.3 C), or rising fever    Blood in vomit or bowel movements (dark red or black color)    Jaundice (yellow color of eyes and skin)    Weakness, dizziness    Chest, arm, back, neck or jaw pain    Unexpected vaginal bleeding or missed period  Call 911  Call emergency services if any of the following occur:    Trouble breathing    Confusion    Fainting or loss of consciousness    Rapid heart rate    Seizure    Thank you for choosing our Emergency Department for your care.     Sincerely,    Dr Jorge Lambert M.D.

## 2017-08-07 NOTE — ED AVS SNAPSHOT
[facility] Emergency Department    [address]     [place] [address]    Phone:  [phone]    Fax:  [phone]                                       Umm Claros   MRN: [id number]    Department:  [facility] Emergency Department   Date of Visit:  8/7/2017           Patient Information     Date Of Birth          1960        Your diagnoses for this visit were:     Left lateral abdominal pain     Chronic constipation     Diverticulosis of large intestine without hemorrhage        You were seen by German Lambert MD.      Follow-up Information     Schedule an appointment as soon as possible for a visit with Lindsay Padgett MD.    Specialty:  Family Practice    Why:  As needed    Contact information:    [address] [address]  [phone]          Discharge Instructions         *Abdominal Pain, Unknown Cause (Female)    The exact cause of your abdominal (stomach) pain is not certain. This does not mean that this is something to worry about, or the right tests were not done. Everyone likes to know the exact cause of the problem, but sometimes with abdominal pain, there is no clear-cut cause, and this could be a good thing. The good news is that your symptoms can be treated, and you will feel better.   Your condition does not seem serious now; however, sometimes the signs of a serious problem may take more time to appear. For this reason, it is important for you to watch for any new symptoms, problems, or worsening of your condition.  Over the next few days, the abdominal pain may come and go, or be continuous. Other common symptoms can include nausea and vomiting. Sometimes it can be difficult to tell if you feel nauseous, you may just feel bad and not associate that feeling with nausea. Constipation, diarrhea, and a fever may go along with the pain.  The pain may continue even if treated correctly over the following days.  Depending on how things go, sometimes the cause can become clear and may require further or different treatment. Additional evaluations, medications, or tests may be needed.  Home care  Your health care provider may prescribe medications for pain, symptoms, or an infection.  Follow the health care provider's instructions for taking these medications.  General care    Rest until your next exam. No strenuous activities.    Try to find positions that ease discomfort. A small pillow placed on the abdomen may help relieve pain.    Something warm on your abdomen (such as a heating pad) may help, but be careful not to burn yourself.  Diet    Do not force yourself to eat, especially if having cramps, vomiting, or diarrhea.    Water is important so you do not get dehydrated. Soup may also be good. Sports drinks may also help, especially if they are not too acidic. Make sure you don't drink sugary drinks as this can make things worse. Take liquids in small amounts. Do not guzzle them.    Caffeine sometimes makes the pain and cramping worse.    Avoid dairy products if you have vomiting or diarrhea.    Don't eat large amounts at a time. Wait a few minutes between bites.    Eat a diet low in fiber (called a low-residue diet). Foods allowed include refined breads, white rice, fruit and vegetable juices without pulp, tender meats. These foods will pass more easily through the intestine.    Avoid fried or fatty foods, dairy, alcohol and spicy foods until your symptoms go away.  Follow-up care  Follow up with your health care provider as planned.  I agree with trying to get into  GI physician sooner, if possible.   When to seek medical care  Seek prompt medical care if any of the following occur:    Pain gets worse or moves to the right lower abdomen    New or worsening vomiting or diarrhea    Swelling of the abdomen    Unable to pass stool for more than three days    New fever over 101  F (38.3 C), or rising fever    Blood in vomit  or bowel movements (dark red or black color)    Jaundice (yellow color of eyes and skin)    Weakness, dizziness    Chest, arm, back, neck or jaw pain    Unexpected vaginal bleeding or missed period  Call 911  Call emergency services if any of the following occur:    Trouble breathing    Confusion    Fainting or loss of consciousness    Rapid heart rate    Seizure    Thank you for choosing our Emergency Department for your care.     Sincerely,    Dr Jorge Lambert M.D.        24 Hour Appointment Hotline       To make an appointment at any [facility] (formerly [facility]), call [phone] ([phone]). If you don't have a family doctor or clinic, we will help you find one. [facility] clinics are conveniently located to serve the needs of you and your family.             Review of your medicines      Our records show that you are taking the medicines listed below. If these are incorrect, please call your family doctor or clinic.        Dose / Directions Last dose taken    amoxicillin-clavulanate 500-125 MG per tablet   Commonly known as:  AUGMENTIN   Dose:  1 tablet   Quantity:  30 tablet        Take 1 tablet by mouth 3 times daily   Refills:  0        ascorbic acid 500 MG tablet   Commonly known as:  VITAMIN C   Quantity:  0        1 TABLET TWICE DAILY   Refills:  0        aspirin 81 MG EC tablet   Dose:  81 mg        Take 81 mg by mouth daily.   Refills:  0        CALCIUM /VITAMIN D TABS   OR   Quantity:  0        1 TABLET DAILY   Refills:  0        docusate sodium 100 MG tablet   Commonly known as:  COLACE   Dose:  100 mg   Quantity:  60 tablet        Take 100 mg by mouth daily.   Refills:  1        fish oil-omega-3 fatty acids 1000 MG capsule   Dose:  1 g        Take 1 g by mouth daily.   Refills:  0        IBUPROFEN PO   Dose:  200 mg        Take 200 mg by mouth as needed.   Refills:  0        levothyroxine 88 MCG tablet   Commonly known as:  SYNTHROID   Dose:  88 mcg   Quantity:  90 tablet        Take 1 tablet (88 mcg) by  mouth every morning   Refills:  3        magnesium 250 MG tablet   Dose:  1 tablet        Take 1 tablet by mouth daily.   Refills:  0        MILK OF MAGNESIA 400 MG/5ML suspension   Dose:  30-60 mL   Quantity:  360 mL   Generic drug:  magnesium hydroxide        Take 30-60 mLs by mouth daily as needed for constipation.   Refills:  1        Multiple vitamin Caps        daily   Refills:  0        TYLENOL PO        Refills:  0        vitamin E 400 UNIT capsule   Quantity:  30 capsule        Two times weekly   Refills:  0                Procedures and tests performed during your visit     CBC with platelets differential    CT Abdomen Pelvis w Contrast    Comprehensive metabolic panel    Lipase    Peripheral IV catheter      Orders Needing Specimen Collection     None      Pending Results     Date and Time Order Name Status Description    8/7/2017 1206 CT Abdomen Pelvis w Contrast Preliminary             Pending Culture Results     No orders found from 8/5/2017 to 8/8/2017.            Pending Results Instructions     If you had any lab results that were not finalized at the time of your Discharge, you can call the ED Lab Result RN at [phone]. You will be contacted by this team for any positive Lab results or changes in treatment. The nurses are available 7 days a week from 10A to 6:30P.  You can leave a message 24 hours per day and they will return your call.        Thank you for choosing [facility]       Thank you for choosing [facility] for your care. Our goal is always to provide you with excellent care. Hearing back from our patients is one way we can continue to improve our services. Please take a few minutes to complete the written survey that you may receive in the mail after you visit with us. Thank you!        [facility] [employer] Information     [facility] [employer] gives you secure access to your electronic health record. If you see a primary care provider, you can also send messages to your care team and make appointments. If you have  questions, please call your primary care clinic.  If you do not have a primary care provider, please call [phone] and they will assist you.        Care EveryWhere ID     This is your Care EveryWhere ID. This could be used by other organizations to access your [facility] medical records  [id number]        Equal Access to Services     CHAYO NOONAN : Mike Schrader, jong stubbs, kenneth bonilla. So [facility] [phone].    ATENCIÓN: Si habla español, tiene a newman disposición servicios gratuitos de asistencia lingüística. Llame al [phone].    We comply with applicable federal civil rights laws and [place] laws. We do not discriminate on the basis of race, color, national origin, age, disability sex, sexual orientation or gender identity.            After Visit Summary       This is your record. Keep this with you and show to your community pharmacist(s) and doctor(s) at your next visit.

## 2017-10-12 ENCOUNTER — TRANSFERRED RECORDS (OUTPATIENT)
Dept: HEALTH INFORMATION MANAGEMENT | Facility: CLINIC | Age: 57
End: 2017-10-12

## 2017-11-02 NOTE — PROGRESS NOTES
SUBJECTIVE:                                                    Umm Claros is a 56 year old female who presents to clinic today for the following health issues:      HPI    Joint Pain    Onset: x 6 months    Description:   Location: right wrist  Character: Sharp and Stabbing    Intensity: moderate    Progression of Symptoms: better if wearing brace, worse if moving it certain ways    Accompanying Signs & Symptoms:  Other symptoms: radiation of pain to forearm    History:   Previous similar pain: no       Precipitating factors:   Trauma or overuse: YES- spring cleaning, weapons class    Alleviating factors:  Improved by: Ibuprofen and Brace    Therapies Tried and outcome: Ibuprofen & Brace          Problem list and histories reviewed & adjusted, as indicated.  Additional history: as documented        Patient Active Problem List   Diagnosis     Hypothyroidism     Intradermal nevus     Osteopenia of prematurity     Chronic constipation     Diverticulitis of colon     Advance Care Planning     De Quervain's disease (tenosynovitis)     Past Surgical History:   Procedure Laterality Date     C APPENDECTOMY  05/26/00    Laparoscopic drainage of left ovarian cyst and lysis of adhesions, dilatation and curettage, and laparoscopic appendectomy performed by Dr. Ryan     COLONOSCOPY  4/10/2012    Procedure:COLONOSCOPY; Colonoscopy; Surgeon:BELL LAYTON; Location:[facility]     HC REMOVAL OF OVARY/TUBE(S)  01/30/01    Laparoscopic left sided salpingo-oophorectomy. Cystoscopy     HC REMOVE TONSILS/ADENOIDS,<11 Y/O      T & A <12y.o.       Social History   Substance Use Topics     Smoking status: Former Smoker     Years: 7.00     Quit date: 4/1/1993     Smokeless tobacco: Never Used     Alcohol use Yes      Comment: 3-5 drinks per week     Family History   Problem Relation Age of Onset     CANCER Paternal Grandmother      breast cancer     Gynecology Mother      endometriosis     CANCER Father      prostate cancer      "Alcohol/Drug Father      alcohol problems and smoker         Current Outpatient Prescriptions   Medication Sig Dispense Refill     predniSONE (DELTASONE) 20 MG tablet Take 2 tablets (40 mg) by mouth daily for 5 days 10 tablet 0     levothyroxine (SYNTHROID) 88 MCG tablet Take 1 tablet (88 mcg) by mouth every morning 90 tablet 3     Acetaminophen (TYLENOL PO)        vitamin E 400 UNIT capsule Two times weekly 30 capsule 0     docusate sodium 100 MG tablet Take 100 mg by mouth daily. 60 tablet 1     magnesium hydroxide (MILK OF MAGNESIA) 400 MG/5ML suspension Take 30-60 mLs by mouth daily as needed for constipation. 360 mL 1     IBUPROFEN PO Take 200 mg by mouth as needed.       aspirin 81 MG EC tablet Take 81 mg by mouth daily.       fish oil-omega-3 fatty acids (FISH OIL) 1000 MG capsule Take 1 g by mouth daily.       Magnesium 250 MG tablet Take 1 tablet by mouth daily.       MULTIPLE VITAMIN CAPS   OR daily  0     CALCIUM /VITAMIN D TABS   OR 1 TABLET DAILY 0 0     VITAMIN C TABS 500 MG OR 1 TABLET TWICE DAILY 0 0     Allergies   Allergen Reactions     No Known Allergies      Tramadol      Other reaction(s): Other - Describe In Comment Field  Dizzy and double vision     BP Readings from Last 3 Encounters:   11/06/17 110/66   08/07/17 118/79   08/03/17 120/68    Wt Readings from Last 3 Encounters:   11/06/17 114 lb (51.7 kg)   08/07/17 114 lb (51.7 kg)   08/03/17 114 lb (51.7 kg)                  Labs reviewed in EPIC        ROS:  Constitutional, HEENT, cardiovascular, pulmonary, GI, , musculoskeletal, neuro, skin, endocrine and psych systems are negative, except as otherwise noted.      OBJECTIVE:   /66 (BP Location: Right arm, Patient Position: Chair, Cuff Size: Adult Regular)  Pulse 65  Temp 97.8  F (36.6  C) (Oral)  Resp 16  Ht 5' 3.15\" (1.604 m)  Wt 114 lb (51.7 kg)  LMP 04/13/2001  SpO2 100%  BMI 20.1 kg/m2  Body mass index is 20.1 kg/(m^2).   Physical Exam   Constitutional: She appears " well-developed and well-nourished.   HENT:   Head: Normocephalic and atraumatic.   Musculoskeletal: She exhibits tenderness. She exhibits no edema or deformity.   Positive finkelstein's test- right hand   Psychiatric: She has a normal mood and affect.         Diagnostic Test Results:  none     ASSESSMENT/PLAN:     Problem List Items Addressed This Visit     De Quervain's disease (tenosynovitis)    Relevant Medications    predniSONE (DELTASONE) 20 MG tablet      Other Visit Diagnoses     Need for prophylactic vaccination and inoculation against influenza    -  Primary    Relevant Orders    FLU VAC, SPLIT VIRUS IM > 3 YO (QUADRIVALENT) [39254] (Completed)    Vaccine Administration, Initial [63564] (Completed)         Discussed brace, NSAID and ice  Discussed home care  Reportable signs and symptoms discussed  RTC if symptoms persist or fail to improve    Lindsay Padgett MD  [facility]

## 2017-11-06 ENCOUNTER — OFFICE VISIT (OUTPATIENT)
Dept: FAMILY MEDICINE | Facility: OTHER | Age: 57
End: 2017-11-06
Payer: COMMERCIAL

## 2017-11-06 VITALS
HEIGHT: 63 IN | OXYGEN SATURATION: 100 % | WEIGHT: 114 LBS | BODY MASS INDEX: 20.2 KG/M2 | SYSTOLIC BLOOD PRESSURE: 110 MMHG | HEART RATE: 65 BPM | RESPIRATION RATE: 16 BRPM | DIASTOLIC BLOOD PRESSURE: 66 MMHG | TEMPERATURE: 97.8 F

## 2017-11-06 DIAGNOSIS — M65.4 DE QUERVAIN'S DISEASE (TENOSYNOVITIS): ICD-10-CM

## 2017-11-06 DIAGNOSIS — Z23 NEED FOR PROPHYLACTIC VACCINATION AND INOCULATION AGAINST INFLUENZA: Primary | ICD-10-CM

## 2017-11-06 PROCEDURE — 99213 OFFICE O/P EST LOW 20 MIN: CPT | Mod: 25 | Performed by: FAMILY MEDICINE

## 2017-11-06 PROCEDURE — 90686 IIV4 VACC NO PRSV 0.5 ML IM: CPT | Performed by: FAMILY MEDICINE

## 2017-11-06 PROCEDURE — 90471 IMMUNIZATION ADMIN: CPT | Performed by: FAMILY MEDICINE

## 2017-11-06 RX ORDER — PREDNISONE 20 MG/1
40 TABLET ORAL DAILY
Qty: 10 TABLET | Refills: 0 | Status: SHIPPED | OUTPATIENT
Start: 2017-11-06 | End: 2017-11-11

## 2017-11-06 ASSESSMENT — PAIN SCALES - GENERAL: PAINLEVEL: NO PAIN (0)

## 2017-11-06 NOTE — PATIENT INSTRUCTIONS
De Quervain Tenosynovitis    De Quervain tenosynovitis is inflammation of tendons and synovium on the thumb side of the wrist. Tendons are fibers that attach muscle to bone. Synovium is a slick membrane that helps tendons move. Movements done over and over can irritate and inflame these tissues. This can cause pain when you touch or grasp objects, turn or twist your wrist, or make a fist. You may also have pain and swelling near the base of the thumb or numbness along the back of your thumb and index finger. You may also feel the thumb catch or snap when you move it.  Treatment will depend on how bad the pain is. It can often be treated with medicines, injections, splinting, and home care. If your case is severe, you may be referred to a specialist to talk about surgery.  Home care  Your healthcare provider may prescribe medicines to relieve pain and reduce inflammation. A steroid medicine may be injected near the tendons. This reduces swelling. The healthcare provider may also suggest taking over-the-counter medicines like ibuprofen or naproxen. These help reduce inflammation. Take all medicines only as directed.  The following are general care guidelines:    Avoid repetitive movements of your wrist and thumb.    Note any activity that causes pain or swelling. If possible, avoid or limit that activity.    Put a cold pack on your thumb. You can make your own cold pack by wrapping a plastic bag of ice or bag of frozen vegetables in a thin towel. Hold this to your thumb for up to 20 minutes at a time. Don't put ice directly on the skin.    Your healthcare provider may put a splint on the thumb to hold it still. Use the splint as you have been instructed. In some cases, you may need to use a splint 24 hours a day for 4 to 6 weeks. This will allow the wrist and thumb to heal.  Follow-up care  Follow up with your healthcare provider, or as advised. You may need more treatment if your injury is severe or if your  symptoms don't get better. This additional treatment may include local injections, physical therapy, and surgery.  When to seek medical advice  Call your healthcare provider right away if any of these occur:    Increase in pain or swelling    If you have fever, chills, redness, warmth, or drainage    Symptoms get worse after taking medicine    Pain spreads farther down the thumb or into the forearm    Pain continues to get in the way of daily life  Date Last Reviewed: 1/18/2016 2000-2017 [employer]. [address] [place] [address]. All rights reserved. This information is not intended as a substitute for professional medical care. Always follow your healthcare professional's instructions.

## 2017-11-06 NOTE — MR AVS SNAPSHOT
After Visit Summary   11/6/2017    Umm Claros    MRN: [id number]           Patient Information     Date Of Birth          1960        Visit Information        Provider Department      11/6/2017 9:40 AM Lindsay Padgett MD [facility]        Today's Diagnoses     Need for prophylactic vaccination and inoculation against influenza    -  1    De Quervain's disease (tenosynovitis)          Care Instructions      De Quervain Tenosynovitis    De Quervain tenosynovitis is inflammation of tendons and synovium on the thumb side of the wrist. Tendons are fibers that attach muscle to bone. Synovium is a slick membrane that helps tendons move. Movements done over and over can irritate and inflame these tissues. This can cause pain when you touch or grasp objects, turn or twist your wrist, or make a fist. You may also have pain and swelling near the base of the thumb or numbness along the back of your thumb and index finger. You may also feel the thumb catch or snap when you move it.  Treatment will depend on how bad the pain is. It can often be treated with medicines, injections, splinting, and home care. If your case is severe, you may be referred to a specialist to talk about surgery.  Home care  Your healthcare provider may prescribe medicines to relieve pain and reduce inflammation. A steroid medicine may be injected near the tendons. This reduces swelling. The healthcare provider may also suggest taking over-the-counter medicines like ibuprofen or naproxen. These help reduce inflammation. Take all medicines only as directed.  The following are general care guidelines:    Avoid repetitive movements of your wrist and thumb.    Note any activity that causes pain or swelling. If possible, avoid or limit that activity.    Put a cold pack on your thumb. You can make your own cold pack by wrapping a plastic bag of ice or bag of frozen vegetables in a thin towel. Hold this to your thumb for  up to 20 minutes at a time. Don't put ice directly on the skin.    Your healthcare provider may put a splint on the thumb to hold it still. Use the splint as you have been instructed. In some cases, you may need to use a splint 24 hours a day for 4 to 6 weeks. This will allow the wrist and thumb to heal.  Follow-up care  Follow up with your healthcare provider, or as advised. You may need more treatment if your injury is severe or if your symptoms don't get better. This additional treatment may include local injections, physical therapy, and surgery.  When to seek medical advice  Call your healthcare provider right away if any of these occur:    Increase in pain or swelling    If you have fever, chills, redness, warmth, or drainage    Symptoms get worse after taking medicine    Pain spreads farther down the thumb or into the forearm    Pain continues to get in the way of daily life  Date Last Reviewed: 1/18/2016 2000-2017 [employer]. [address] [place] [address]. All rights reserved. This information is not intended as a substitute for professional medical care. Always follow your healthcare professional's instructions.                Follow-ups after your visit        Follow-up notes from your care team     Return if symptoms worsen or fail to improve.      Who to contact     If you have questions or need follow up information about today's clinic visit or your schedule please contact [facility] directly at [phone].  Normal or non-critical lab and imaging results will be communicated to you by MyChart, letter or phone within 4 business days after the clinic has received the results. If you do not hear from us within 7 days, please contact the clinic through MyChart or phone. If you have a critical or abnormal lab result, we will notify you by phone as soon as possible.  Submit refill requests through [employer] or call your pharmacy and they will forward the refill  "request to us. Please allow 3 business days for your refill to be completed.          Additional Information About Your Visit        [employer] Information     [employer] gives you secure access to your electronic health record. If you see a primary care provider, you can also send messages to your care team and make appointments. If you have questions, please call your primary care clinic.  If you do not have a primary care provider, please call [phone] and they will assist you.        Care EveryWhere ID     This is your Care EveryWhere ID. This could be used by other organizations to access your [facility] medical records  [id number]        Your Vitals Were     Pulse Temperature Respirations Height Last Period Pulse Oximetry    65 97.8  F (36.6  C) (Oral) 16 5' 3.15\" (1.604 m) 04/13/2001 100%    BMI (Body Mass Index)                   20.1 kg/m2            Blood Pressure from Last 3 Encounters:   11/06/17 110/66   08/07/17 118/79   08/03/17 120/68    Weight from Last 3 Encounters:   11/06/17 114 lb (51.7 kg)   08/07/17 114 lb (51.7 kg)   08/03/17 114 lb (51.7 kg)              We Performed the Following     ADMIN INFLUENZA (For MEDICARE Patients ONLY) []     Vaccine Administration, Initial [55082]          Today's Medication Changes          These changes are accurate as of: 11/6/17 10:24 AM.  If you have any questions, ask your nurse or doctor.               Start taking these medicines.        Dose/Directions    predniSONE 20 MG tablet   Commonly known as:  DELTASONE   Used for:  De Quervain's disease (tenosynovitis)   Started by:  Lindsay Padgett MD        Dose:  40 mg   Take 2 tablets (40 mg) by mouth daily for 5 days   Quantity:  10 tablet   Refills:  0         Stop taking these medicines if you haven't already. Please contact your care team if you have questions.     amoxicillin-clavulanate 500-125 MG per tablet   Commonly known as:  AUGMENTIN   Stopped by:  Lindsay Padgett MD                Where to " get your medicines      These medications were sent to [facility], [place] - [address]   [address] , [facility] [address]     Phone:  [phone]     predniSONE 20 MG tablet                Primary Care Provider Office Phone # Fax #    Lindsay Padgett [phone]       [address] [facility] [address]  [facility] [address]        Equal Access to Services     [facility]: Hadii aad ku hadasho Soomaali, waaxda luqadaha, qaybta kaalmada adeegyada, waxay idiin haydustinn adekristie jacksonjacquelinemelvi lanik . So [facility] [phone].    ATENCIÓN: Si giorgila espnguyen, tiene a newman disposición servicios gratuitos de asistencia lingüística. [facility] [phone].    We comply with applicable federal civil rights laws and [place] laws. We do not discriminate on the basis of race, color, national origin, age, disability, sex, sexual orientation, or gender identity.            Thank you!     Thank you for choosing [facility]  for your care. Our goal is always to provide you with excellent care. Hearing back from our patients is one way we can continue to improve our services. Please take a few minutes to complete the written survey that you may receive in the mail after your visit with us. Thank you!             Your Updated Medication List - Protect others around you: Learn how to safely use, store and throw away your medicines at www.disposemymeds.org.          This list is accurate as of: 11/6/17 10:24 AM.  Always use your most recent med list.                   Brand Name Dispense Instructions for use Diagnosis    ascorbic acid 500 MG tablet    VITAMIN C    0    1 TABLET TWICE DAILY        aspirin 81 MG EC tablet      Take 81 mg by mouth daily.        CALCIUM /VITAMIN D TABS   OR     0    1 TABLET DAILY        docusate sodium 100 MG tablet    COLACE    60 tablet    Take 100 mg by mouth daily.    LLQ abdominal pain, Diverticulosis of colon       fish oil-omega-3 fatty acids 1000 MG capsule       Take 1 g by mouth daily.        IBUPROFEN PO      Take 200 mg by mouth as needed.        levothyroxine 88 MCG tablet    SYNTHROID    90 tablet    Take 1 tablet (88 mcg) by mouth every morning    Hypothyroidism, unspecified type       magnesium 250 MG tablet      Take 1 tablet by mouth daily.        MILK OF MAGNESIA 400 MG/5ML suspension   Generic drug:  magnesium hydroxide     360 mL    Take 30-60 mLs by mouth daily as needed for constipation.    LLQ abdominal pain, Diverticulosis of colon       Multiple vitamin Caps      daily        predniSONE 20 MG tablet    DELTASONE    10 tablet    Take 2 tablets (40 mg) by mouth daily for 5 days    De Quervain's disease (tenosynovitis)       TYLENOL PO           vitamin E 400 UNIT capsule     30 capsule    Two times weekly

## 2017-11-06 NOTE — NURSING NOTE
"Chief Complaint   Patient presents with     Wrist Pain     Panel Management     flu       Initial /66 (BP Location: Right arm, Patient Position: Chair, Cuff Size: Adult Regular)  Pulse 65  Temp 97.8  F (36.6  C) (Oral)  Resp 16  Ht 5' 3.15\" (1.604 m)  Wt 114 lb (51.7 kg)  LMP 04/13/2001  SpO2 100%  BMI 20.1 kg/m2 Estimated body mass index is 20.1 kg/(m^2) as calculated from the following:    Height as of this encounter: 5' 3.15\" (1.604 m).    Weight as of this encounter: 114 lb (51.7 kg).  Medication Reconciliation: complete   Kelly Sewell CMA (AAMA)      "

## 2017-11-06 NOTE — PROGRESS NOTES
Injectable Influenza Immunization Documentation    1.  Is the person to be vaccinated sick today?   No    2. Does the person to be vaccinated have an allergy to a component   of the vaccine?   No  Egg Allergy Algorithm Link    3. Has the person to be vaccinated ever had a serious reaction   to influenza vaccine in the past?   No    4. Has the person to be vaccinated ever had Guillain-Barré syndrome?   No    Form completed by Kelly Sewell CMA ([facility])           Injectable Influenza Immunization Documentation    1.  Is the person to be vaccinated sick today?   No    2. Does the person to be vaccinated have an allergy to a component   of the vaccine?   No  Egg Allergy Algorithm Link    3. Has the person to be vaccinated ever had a serious reaction   to influenza vaccine in the past?   No    4. Has the person to be vaccinated ever had Guillain-Barré syndrome?   No    Form completed by Kelly Sewell CMA ([facility])

## 2018-01-04 ENCOUNTER — MYC MEDICAL ADVICE (OUTPATIENT)
Dept: FAMILY MEDICINE | Facility: OTHER | Age: 58
End: 2018-01-04

## 2018-01-04 DIAGNOSIS — M65.4 RADIAL STYLOID TENOSYNOVITIS: Primary | ICD-10-CM

## 2018-01-04 NOTE — TELEPHONE ENCOUNTER
"Last OV was 11/06.     Per last OV: \"Discussed brace, NSAID and ice  Discussed home care  Reportable signs and symptoms discussed  RTC if symptoms persist or fail to improve\"    May need another appointment. Will route to RK to review/advise.  Marietta Ventura CMA    "

## 2018-01-11 ENCOUNTER — PRE VISIT (OUTPATIENT)
Dept: ORTHOPEDICS | Facility: CLINIC | Age: 58
End: 2018-01-11

## 2018-01-11 DIAGNOSIS — M65.4 RADIAL STYLOID TENOSYNOVITIS: Primary | ICD-10-CM

## 2018-01-11 NOTE — TELEPHONE ENCOUNTER
Pt has an appointment for Right wrist pain, dequervain's tenosynovitis. Left side symptoms as well, just not as severe. Twinging, but would only like right side evaluated.   1. Do you have recent xrays (if not seen in EPIC)?No -  Patient informed that they will most likily have x-rays done before appointment and to arrive at least 30 minutes before MD appointment.   2. Do you have any MRI's (if not seen in EPIC)? No.   3. Have you had surgery in the past for the same issue?No.   4. Are you being referred by another provider? Yes: PCP  If yes-Records in Epic or being obtained by: in epic.  5. Is this work comp or MVA related?  No.  6. Did you have an EMG test? No.      Tremayne Martin RN

## 2018-01-15 ENCOUNTER — RADIANT APPOINTMENT (OUTPATIENT)
Dept: GENERAL RADIOLOGY | Facility: CLINIC | Age: 58
End: 2018-01-15
Attending: ORTHOPAEDIC SURGERY
Payer: COMMERCIAL

## 2018-01-15 ENCOUNTER — OFFICE VISIT (OUTPATIENT)
Dept: ORTHOPEDICS | Facility: CLINIC | Age: 58
End: 2018-01-15
Payer: COMMERCIAL

## 2018-01-15 VITALS
HEART RATE: 72 BPM | BODY MASS INDEX: 20.2 KG/M2 | WEIGHT: 114 LBS | OXYGEN SATURATION: 98 % | HEIGHT: 63 IN | SYSTOLIC BLOOD PRESSURE: 120 MMHG | DIASTOLIC BLOOD PRESSURE: 76 MMHG

## 2018-01-15 DIAGNOSIS — M65.4 RADIAL STYLOID TENOSYNOVITIS: Primary | ICD-10-CM

## 2018-01-15 DIAGNOSIS — M65.4 RADIAL STYLOID TENOSYNOVITIS: ICD-10-CM

## 2018-01-15 PROCEDURE — 99203 OFFICE O/P NEW LOW 30 MIN: CPT | Performed by: ORTHOPAEDIC SURGERY

## 2018-01-15 PROCEDURE — 73110 X-RAY EXAM OF WRIST: CPT | Mod: RT | Performed by: RADIOLOGY

## 2018-01-15 ASSESSMENT — PAIN SCALES - GENERAL: PAINLEVEL: NO PAIN (0)

## 2018-01-15 NOTE — NURSING NOTE
"Umm Claros's goals for this visit include: Evaluate right wrist pain, x 7-8 months  She requests these members of her care team be copied on today's visit information: yes    PCP: Lindsay Padgett    Referring Provider:  No referring provider defined for this encounter.    Chief Complaint   Patient presents with     Consult     Right wrist pain x 7-8 months       Initial /76  Pulse 72  Ht 1.6 m (5' 3\")  Wt 51.7 kg (114 lb)  LMP 04/13/2001  SpO2 98%  BMI 20.19 kg/m2 Estimated body mass index is 20.19 kg/(m^2) as calculated from the following:    Height as of this encounter: 1.6 m (5' 3\").    Weight as of this encounter: 51.7 kg (114 lb).  Medication Reconciliation: complete     Hand Evaluation    Pain Score (1-10): No Pain (0) (Pain increase with use)    QuickDASH Disability Score: 40.91      Hand Dominance: Right       Force:   R hand  level 1 force: 22.7 kg (50 lb)  R hand  level 3 force: 24.9 kg (55 lb)  R hand  level 5 force: 18.1 kg (40 lb)  L hand   level 1 force: 18.1 kg (40 lb)  L hand  level 3 force: 22.7 kg (50 lb)  L hand  level 5 force: 18.1 kg (40 lb)          "

## 2018-01-15 NOTE — MR AVS SNAPSHOT
After Visit Summary   1/15/2018    Umm Claros    MRN: [id number]           Patient Information     Date Of Birth          1960        Visit Information        Provider Department      1/15/2018 1:15 PM Odessa Hernadez MD [facility]        Today's Diagnoses     Radial styloid tenosynovitis    -  1      Care Instructions    Thanks for coming today.  Ortho/Sports Medicine Clinic  [address] [place] [address]    To schedule future appointments in Ortho Clinic, you may call [phone].    To schedule ordered imaging by your provider:   Call Central Imaging [phone]    To schedule an injection ordered by your provider:  Call [facility] Injection scheduling line: [phone]  NewsFixed available online at:  [email or web address]    Please call if any further questions or concerns ([phone]).  Clinic hours 8 am to 5 pm.    Return to clinic (call) if symptoms worsen or fail to improve.            Follow-ups after your visit        Additional Services     [facility] PT, HAND, AND CHIROPRACTIC REFERRAL       **This order will print in the CALL[facility] Scheduling Office**    Physical Therapy, Hand Therapy and Chiropractic Care are available through:    *[facility]  *[facility]  *[facility]    Call one number to schedule at any of the above locations: [phone].    Your provider has referred you to: Hand Therapy    Indication/Reason for Referral: Right DeQuervain's  Onset of Illness: unknown  Therapy Orders: Evaluate and Treat, tendon gliding exercises  Special Programs: None  Special Request: None    Gabriela Ochoa      Additional Comments for the Therapist or Chiropractor: n/a    Please be aware that coverage of these services is subject to the terms and limitations of your health insurance plan.  Call member services at your health plan with any benefit or coverage questions.      Please  bring the following to your appointment:    *Your personal calendar for scheduling future appointments  *Comfortable clothing                  Follow-up notes from your care team     Return if symptoms worsen or fail to improve.      Your next 10 appointments already scheduled     Jan 23, 2018  1:00 PM CST   (Arrive by 12:45 PM)   [facility] Cedeno with Belem Rodriguez OT   [facility] ([facility])    [address]  [facility] [address] [phone]              Who to contact     If you have questions or need follow up information about today's clinic visit or your schedule please contact [facility] directly at [phone].  Normal or non-critical lab and imaging results will be communicated to you by [employer], letter or phone within 4 business days after the clinic has received the results. If you do not hear from us within 7 days, please contact the clinic through [employer] or phone. If you have a critical or abnormal lab result, we will notify you by phone as soon as possible.  Submit refill requests through [employer] or call your pharmacy and they will forward the refill request to us. Please allow 3 business days for your refill to be completed.          Additional Information About Your Visit        [employer] Information     [employer] gives you secure access to your electronic health record. If you see a primary care provider, you can also send messages to your care team and make appointments. If you have questions, please call your primary care clinic.  If you do not have a primary care provider, please call [phone] and they will assist you.      pyco is an electronic gateway that provides easy, online access to your medical records. With pyco, you can request a clinic appointment, read your test results, renew a prescription or communicate with your care team.     To access your existing account, please contact your [facility]  "Clinic or call [phone] for assistance.        Care EveryWhere ID     This is your Care EveryWhere ID. This could be used by other organizations to access your [facility] medical records  [id number]        Your Vitals Were     Pulse Height Last Period Pulse Oximetry BMI (Body Mass Index)       72 1.6 m (5' 3\") 04/13/2001 98% 20.19 kg/m2        Blood Pressure from Last 3 Encounters:   01/15/18 120/76   11/06/17 110/66   08/07/17 118/79    Weight from Last 3 Encounters:   01/15/18 51.7 kg (114 lb)   11/06/17 51.7 kg (114 lb)   08/07/17 51.7 kg (114 lb)              We Performed the Following     [facility] PT, HAND, AND CHIROPRACTIC REFERRAL        Primary Care Provider Office Phone # Fax #    Lindsay Padgett [phone]       [address] [facility] [address]  [facility] [address]        Equal Access to Services     [facility] : Hadii aad ku hadasho Soomaali, waaxda luqadaha, qaybta kaalmada adeegyada, waxay ervinin haydustinn desmond ma . So [facility] [phone].    ATENCIÓN: Si habla español, tiene a newman disposición servicios gratuitos de asistencia lingüística. [facility] [phone].    We comply with applicable federal civil rights laws and [place] laws. We do not discriminate on the basis of race, color, national origin, age, disability, sex, sexual orientation, or gender identity.            Thank you!     Thank you for choosing [facility]  for your care. Our goal is always to provide you with excellent care. Hearing back from our patients is one way we can continue to improve our services. Please take a few minutes to complete the written survey that you may receive in the mail after your visit with us. Thank you!             Your Updated Medication List - Protect others around you: Learn how to safely use, store and throw away your medicines at www.disposemymeds.org.          This list is accurate as of: 1/15/18  5:10 PM.  Always use your most recent med list.                   Brand " Name Dispense Instructions for use Diagnosis    ascorbic acid 500 MG tablet    VITAMIN C    0    1 TABLET TWICE DAILY        aspirin 81 MG EC tablet      Take 81 mg by mouth daily.        CALCIUM /VITAMIN D TABS   OR     0    1 TABLET DAILY        docusate sodium 100 MG tablet    COLACE    60 tablet    Take 100 mg by mouth daily.    LLQ abdominal pain, Diverticulosis of colon       fish oil-omega-3 fatty acids 1000 MG capsule      Take 1 g by mouth daily.        IBUPROFEN PO      Take 200 mg by mouth as needed.        levothyroxine 88 MCG tablet    SYNTHROID    90 tablet    Take 1 tablet (88 mcg) by mouth every morning    Hypothyroidism, unspecified type       magnesium 250 MG tablet      Take 1 tablet by mouth daily.        MILK OF MAGNESIA 400 MG/5ML suspension   Generic drug:  magnesium hydroxide     360 mL    Take 30-60 mLs by mouth daily as needed for constipation.    LLQ abdominal pain, Diverticulosis of colon       Multiple vitamin Caps      daily        TYLENOL PO           vitamin E 400 UNIT capsule     30 capsule    Two times weekly

## 2018-01-15 NOTE — PATIENT INSTRUCTIONS
Thanks for coming today.  Ortho/Sports Medicine Clinic  [address] [place] [address]    To schedule future appointments in Ortho Clinic, you may call [phone].    To schedule ordered imaging by your provider:   Call Central Imaging [phone]    To schedule an injection ordered by your provider:  Call Central Imaging Injection scheduling line: [phone]  MEDEMhart available online at:  [email or web address]    Please call if any further questions or concerns ([phone]).  Clinic hours 8 am to 5 pm.    Return to clinic (call) if symptoms worsen or fail to improve.

## 2018-01-15 NOTE — LETTER
1/15/2018         RE: Umm Claros  [address] [facility] [address]        Dear Colleague,    Thank you for referring your patient, Umm Claros, to the [facility]. Please see a copy of my visit note below.    Date of Service: Tony 15, 2018    Chief Complaint: Right wrist pain.    History of Present Illness: Umm Claros is a 57 year old, right handed female who presents today for further evaluation of right wrist pain that has been ongoing for 7 months. The patient reports she say her primary care provider for ongoing right radial wrist pain that worsens with activities like lifting children, scrubbing, etc. She was given a splint by her primary doctor for de Quervain's and has been taking ibuprofen as needed for pain. These interventions have helped some.  She notes she also has felt a nodule at her radial styloid that is painful when bumped. She says she commonly feels sore in her dorsal forearm. She has not received any injections or other treatment.  She denies numbness or tingling in her fingers.  No symptoms on the left.    Review of Systems: A 14-point review of systems was obtained on the intake form and scanned into the medical record.  Pertinent positives include diverticulitis, hypothyroidism and otherwise review of systems is negative.    Past Medical History:  Past Medical History:   Diagnosis Date     Anxiety state, unspecified      Depressive disorder, not elsewhere classified     depression     Raynaud's syndrome      Tobacco use disorder      Unspecified hypothyroidism      Past Surgical History:  Past Surgical History:   Procedure Laterality Date     C APPENDECTOMY  05/26/00    Laparoscopic drainage of left ovarian cyst and lysis of adhesions, dilatation and curettage, and laparoscopic appendectomy performed by Dr. Ryan     COLONOSCOPY  4/10/2012    Procedure:COLONOSCOPY; Colonoscopy; Surgeon:BELL LAYTON; Location:[facility] REMOVAL OF  "OVARY/TUBE(S)  01/30/01    Laparoscopic left sided salpingo-oophorectomy. Cystoscopy     HC REMOVE TONSILS/ADENOIDS,<13 Y/O      T & A <12y.o.     MEDICATIONS:    Current Outpatient Prescriptions:      levothyroxine (SYNTHROID) 88 MCG tablet, Take 1 tablet (88 mcg) by mouth every morning, Disp: 90 tablet, Rfl: 3     Acetaminophen (TYLENOL PO), , Disp: , Rfl:      vitamin E 400 UNIT capsule, Two times weekly, Disp: 30 capsule, Rfl: 0     docusate sodium 100 MG tablet, Take 100 mg by mouth daily., Disp: 60 tablet, Rfl: 1     magnesium hydroxide (MILK OF MAGNESIA) 400 MG/5ML suspension, Take 30-60 mLs by mouth daily as needed for constipation., Disp: 360 mL, Rfl: 1     IBUPROFEN PO, Take 200 mg by mouth as needed., Disp: , Rfl:      aspirin 81 MG EC tablet, Take 81 mg by mouth daily., Disp: , Rfl:      fish oil-omega-3 fatty acids (FISH OIL) 1000 MG capsule, Take 1 g by mouth daily., Disp: , Rfl:      Magnesium 250 MG tablet, Take 1 tablet by mouth daily., Disp: , Rfl:      MULTIPLE VITAMIN CAPS   OR, daily, Disp: , Rfl: 0     CALCIUM /VITAMIN D TABS   OR, 1 TABLET DAILY, Disp: 0, Rfl: 0     VITAMIN C TABS 500 MG OR, 1 TABLET TWICE DAILY, Disp: 0, Rfl: 0    ALLERGIES:  Allergies   Allergen Reactions     No Known Allergies      Tramadol      Other reaction(s): Other - Describe In Comment Field  Dizzy and double vision       Social History:  Patient lives with her .  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  Positive alcohol use consisting of wine 3-4 nights a week.      Family History:  Family History   Problem Relation Age of Onset     CANCER Paternal Grandmother      breast cancer     Gynecology Mother      endometriosis     CANCER Father      prostate cancer     Alcohol/Drug Father      alcohol problems and smoker   Negative for bleeding or clotting disorders or adverse reactions to anesthesia.    Physical examination:  VITALS: /76  Pulse 72  Ht 1.6 m (5' 3\")  Wt 51.7 kg (114 lb)  LMP 04/13/2001  SpO2 " 98%  BMI 20.19 kg/m2  Pain is rated 0 out of 10 on the visual analog scale.  QUICKDASH: 40.91   Strength: The patient's  strength at 3 levels is (50,55,40) pounds on the right, versus (40,50,40) pounds on the left.   GENERAL: Healthy-appearing adult female in no acute distress.  Alert and oriented times three.  HEENT: Head normocephalic and atraumatic.  Extra-ocular movements intact.  Neck: Full range of motion without pain.  Respiratory: Breathing regular and non-labored.  Right upper extremity: Full shoulder, elbow, forearm, and wrist range of motion. Tenderness at the radial styloid with a palpable prominence. No reproducible clicking or catching in any digits. No tenderness at the ulnar snuffbox or anatomic snuffbox.  No tenderness at the first CMC joint.  Positive Finkelstein's.  5/5 finger abduction, EPL, and FPL are intact. Patient's neurovascular exam is intact and all digits are warm and well perfused with capillary refill in less than 2 seconds.  Skin: Intact without any rashes or abrasions.    Radiographs: Three views of the right wrist were obtained today and independently reviewed. These demonstrated no acute fractures or dislocations.  No significant osteoarthritis noted.    Assessment: 57 year old, right handed female with right de Quervain's tenosynovitis.    Plan: Mrs. Claros and I discussed her ongoing de Quervain's tenosynovitis and possible treatment options. I explained to her that the splinting she has been doing is a good start to controlling her pain, especially if she wants to avoid either corticosteroid injections or surgical intervention. I mentioned she could do hand therapy to learn some tendon gliding exercises as well before seeking other interventions. We discussed using an injection and I explained they do typically help with pain. I told her injections are not always a permanent solution and if they prove ineffective then surgery is the next option. We discussed the varying  risks related to injections as well as the effectiveness and possible outcomes. At this time, she is interested in an injection, but would like to think about it and follow up with me when/if she is ready. I will also refer her to hand therapy so she can learn tendon gliding exercises. She agrees with the plan and all of her questions were answered at this time.  She may call to schedule an injection appointment with me at any time.    I, Odessa Hernadez MD, have reviewed the above note and agree with the scribe's notation as written.   I, Rakan Fay, am serving as a scribe to document services personally performed by Odessa Hernadez MD, based upon my observations and the provider's statements to me. All documentation has been reviewed by the aforementioned doctor prior to being entered into the official medical record.      Again, thank you for allowing me to participate in the care of your patient.        Sincerely,        Odessa Hernadez MD

## 2018-01-15 NOTE — PROGRESS NOTES
Date of Service: Tony 15, 2018    Chief Complaint: Right wrist pain.    History of Present Illness: Umm Claros is a 57 year old, right handed female who presents today for further evaluation of right wrist pain that has been ongoing for 7 months. The patient reports she say her primary care provider for ongoing right radial wrist pain that worsens with activities like lifting children, scrubbing, etc. She was given a splint by her primary doctor for de Quervain's and has been taking ibuprofen as needed for pain. These interventions have helped some.  She notes she also has felt a nodule at her radial styloid that is painful when bumped. She says she commonly feels sore in her dorsal forearm. She has not received any injections or other treatment.  She denies numbness or tingling in her fingers.  No symptoms on the left.    Review of Systems: A 14-point review of systems was obtained on the intake form and scanned into the medical record.  Pertinent positives include diverticulitis, hypothyroidism and otherwise review of systems is negative.    Past Medical History:  Past Medical History:   Diagnosis Date     Anxiety state, unspecified      Depressive disorder, not elsewhere classified     depression     Raynaud's syndrome      Tobacco use disorder      Unspecified hypothyroidism      Past Surgical History:  Past Surgical History:   Procedure Laterality Date     C APPENDECTOMY  05/26/00    Laparoscopic drainage of left ovarian cyst and lysis of adhesions, dilatation and curettage, and laparoscopic appendectomy performed by Dr. Ryan     COLONOSCOPY  4/10/2012    Procedure:COLONOSCOPY; Colonoscopy; Surgeon:BELL LAYTON; [facility]: [facility]     HC REMOVAL OF OVARY/TUBE(S)  01/30/01    Laparoscopic left sided salpingo-oophorectomy. Cystoscopy     HC REMOVE TONSILS/ADENOIDS,<11 Y/O      T & A <12y.o.     MEDICATIONS:    Current Outpatient Prescriptions:      levothyroxine (SYNTHROID) 88 MCG tablet, Take 1 tablet  "(88 mcg) by mouth every morning, Disp: 90 tablet, Rfl: 3     Acetaminophen (TYLENOL PO), , Disp: , Rfl:      vitamin E 400 UNIT capsule, Two times weekly, Disp: 30 capsule, Rfl: 0     docusate sodium 100 MG tablet, Take 100 mg by mouth daily., Disp: 60 tablet, Rfl: 1     magnesium hydroxide (MILK OF MAGNESIA) 400 MG/5ML suspension, Take 30-60 mLs by mouth daily as needed for constipation., Disp: 360 mL, Rfl: 1     IBUPROFEN PO, Take 200 mg by mouth as needed., Disp: , Rfl:      aspirin 81 MG EC tablet, Take 81 mg by mouth daily., Disp: , Rfl:      fish oil-omega-3 fatty acids (FISH OIL) 1000 MG capsule, Take 1 g by mouth daily., Disp: , Rfl:      Magnesium 250 MG tablet, Take 1 tablet by mouth daily., Disp: , Rfl:      MULTIPLE VITAMIN CAPS   OR, daily, Disp: , Rfl: 0     CALCIUM /VITAMIN D TABS   OR, 1 TABLET DAILY, Disp: 0, Rfl: 0     VITAMIN C TABS 500 MG OR, 1 TABLET TWICE DAILY, Disp: 0, Rfl: 0    ALLERGIES:  Allergies   Allergen Reactions     No Known Allergies      Tramadol      Other reaction(s): Other - Describe In Comment Field  Dizzy and double vision       Social History:  Patient lives with her .  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  Positive alcohol use consisting of wine 3-4 nights a week.      Family History:  Family History   Problem Relation Age of Onset     CANCER Paternal Grandmother      breast cancer     Gynecology Mother      endometriosis     CANCER Father      prostate cancer     Alcohol/Drug Father      alcohol problems and smoker   Negative for bleeding or clotting disorders or adverse reactions to anesthesia.    Physical examination:  VITALS: /76  Pulse 72  Ht 1.6 m (5' 3\")  Wt 51.7 kg (114 lb)  LMP 04/13/2001  SpO2 98%  BMI 20.19 kg/m2  Pain is rated 0 out of 10 on the visual analog scale.  QUICKDASH: 40.91   Strength: The patient's  strength at 3 levels is (50,55,40) pounds on the right, versus (40,50,40) pounds on the left.   GENERAL: Healthy-appearing " adult female in no acute distress.  Alert and oriented times three.  HEENT: Head normocephalic and atraumatic.  Extra-ocular movements intact.  Neck: Full range of motion without pain.  Respiratory: Breathing regular and non-labored.  Right upper extremity: Full shoulder, elbow, forearm, and wrist range of motion. Tenderness at the radial styloid with a palpable prominence. No reproducible clicking or catching in any digits. No tenderness at the ulnar snuffbox or anatomic snuffbox.  No tenderness at the first CMC joint.  Positive Finkelstein's.  5/5 finger abduction, EPL, and FPL are intact. Patient's neurovascular exam is intact and all digits are warm and well perfused with capillary refill in less than 2 seconds.  Skin: Intact without any rashes or abrasions.    Radiographs: Three views of the right wrist were obtained today and independently reviewed. These demonstrated no acute fractures or dislocations.  No significant osteoarthritis noted.    Assessment: 57 year old, right handed female with right de Quervain's tenosynovitis.    Plan: Mrs. Claros and I discussed her ongoing de Quervain's tenosynovitis and possible treatment options. I explained to her that the splinting she has been doing is a good start to controlling her pain, especially if she wants to avoid either corticosteroid injections or surgical intervention. I mentioned she could do hand therapy to learn some tendon gliding exercises as well before seeking other interventions. We discussed using an injection and I explained they do typically help with pain. I told her injections are not always a permanent solution and if they prove ineffective then surgery is the next option. We discussed the varying risks related to injections as well as the effectiveness and possible outcomes. At this time, she is interested in an injection, but would like to think about it and follow up with me when/if she is ready. I will also refer her to hand therapy so she  can learn tendon gliding exercises. She agrees with the plan and all of her questions were answered at this time.  She may call to schedule an injection appointment with me at any time.    I, Odessa Hernadez MD, have reviewed the above note and agree with the scribe's notation as written.   I, Rakan Fay, am serving as a scribe to document services personally performed by Odessa Hernadez MD, based upon my observations and the provider's statements to me. All documentation has been reviewed by the aforementioned doctor prior to being entered into the official medical record.

## 2018-01-23 ENCOUNTER — THERAPY VISIT (OUTPATIENT)
Dept: OCCUPATIONAL THERAPY | Facility: CLINIC | Age: 58
End: 2018-01-23
Payer: COMMERCIAL

## 2018-01-23 DIAGNOSIS — M25.531 RIGHT WRIST PAIN: ICD-10-CM

## 2018-01-23 DIAGNOSIS — M65.4 RADIAL STYLOID TENOSYNOVITIS: Primary | ICD-10-CM

## 2018-01-23 PROCEDURE — 97165 OT EVAL LOW COMPLEX 30 MIN: CPT | Mod: GO | Performed by: OCCUPATIONAL THERAPIST

## 2018-01-23 PROCEDURE — 97140 MANUAL THERAPY 1/> REGIONS: CPT | Mod: GO | Performed by: OCCUPATIONAL THERAPIST

## 2018-01-23 PROCEDURE — 97035 APP MDLTY 1+ULTRASOUND EA 15: CPT | Mod: GO | Performed by: OCCUPATIONAL THERAPIST

## 2018-01-23 PROCEDURE — 97110 THERAPEUTIC EXERCISES: CPT | Mod: GO | Performed by: OCCUPATIONAL THERAPIST

## 2018-01-23 NOTE — MR AVS SNAPSHOT
After Visit Summary   1/23/2018    Umm Claros    MRN: [id number]           Patient Information     Date Of Birth          1960        Visit Information        Provider Department      1/23/2018 1:00 PM Belem Rodriguez, OT [facility]        Today's Diagnoses     Radial styloid tenosynovitis    -  1    Right wrist pain           Follow-ups after your visit        Your next 10 appointments already scheduled     Jan 30, 2018  2:00 PM CST   [facility] Belem [facility], [facility]   [facility]    [address]  [facility] [address]   [phone]            Feb 06, 2018 11:30 AM CST   [facility]I Hand with Belem Rodriguez OT   [facility]    [address]  [facility] [address]   [phone]            Feb 13, 2018 11:30 AM CST   [facility] Hand with Belem Rodriguez, OT   [facility]    [address]  [facility] [address]   [phone]              Who to contact     If you have questions or need follow up information about today's clinic visit or your schedule please contact [facility] directly at [phone].  Normal or non-critical lab and imaging results will be communicated to you by Folkstrhart, letter or phone within 4 business days after the clinic has received the results. If you do not hear from us within 7 days, please contact the clinic through Folkstrhart or phone. If you have a critical or abnormal lab result, we will notify you by phone as soon as possible.  Submit refill requests through Bioenvision or call your pharmacy and they will forward the refill request to us. Please allow 3 business days for your refill to be completed.          Additional Information About Your Visit        Folkstrhart Information     Bioenvision gives you secure access to your electronic health record. If you see a primary care provider, you can also send messages  to your care team and make appointments. If you have questions, please call your primary care clinic.  If you do not have a primary care provider, please call [phone] and they will assist you.        Care EveryWhere ID     This is your Care EveryWhere ID. This could be used by other organizations to access your [facility] medical records  [id number]        Your Vitals Were     Last Period                   04/13/2001            Blood Pressure from Last 3 Encounters:   01/15/18 120/76   11/06/17 110/66   08/07/17 118/79    Weight from Last 3 Encounters:   01/15/18 51.7 kg (114 lb)   11/06/17 51.7 kg (114 lb)   08/07/17 51.7 kg (114 lb)              We Performed the Following     HC OT EVAL, LOW COMPLEXITY     CALLI INITIAL EVAL REPORT     MANUAL THER TECH,1+REGIONS,EA 15 MIN     THERAPEUTIC EXERCISES     ULTRASOUND THERAPY        Primary Care Provider Office Phone # Fax #    Lindsay Padgett [phone]       [address] [place] [address] [address]  [facility] [address]        Equal Access to Services     [facility]: Hadii aad ku hadasho Soomaali, waaxda luqadaha, qaybta kaalmada adecooper, kenneth ma . So [facility] [phone].    ATENCIÓN: Si habla español, tiene a newman disposición servicios gratuitos de asistencia lingüística. Llame al [phone].    We comply with applicable federal civil rights laws and [place] laws. We do not discriminate on the basis of race, color, national origin, age, disability, sex, sexual orientation, or gender identity.            Thank you!     Thank you for choosing [facility]  for your care. Our goal is always to provide you with excellent care. Hearing back from our patients is one way we can continue to improve our services. Please take a few minutes to complete the written survey that you may receive in the mail after your visit with us. Thank you!             Your Updated Medication List - Protect others around you: Learn how to safely  use, store and throw away your medicines at www.disposemymeds.org.          This list is accurate as of: 1/23/18  5:57 PM.  Always use your most recent med list.                   Brand Name Dispense Instructions for use Diagnosis    ascorbic acid 500 MG tablet    VITAMIN C    0    1 TABLET TWICE DAILY        aspirin 81 MG EC tablet      Take 81 mg by mouth daily.        CALCIUM /VITAMIN D TABS   OR     0    1 TABLET DAILY        docusate sodium 100 MG tablet    COLACE    60 tablet    Take 100 mg by mouth daily.    LLQ abdominal pain, Diverticulosis of colon       fish oil-omega-3 fatty acids 1000 MG capsule      Take 1 g by mouth daily.        IBUPROFEN PO      Take 200 mg by mouth as needed.        levothyroxine 88 MCG tablet    SYNTHROID    90 tablet    Take 1 tablet (88 mcg) by mouth every morning    Hypothyroidism, unspecified type       magnesium 250 MG tablet      Take 1 tablet by mouth daily.        MILK OF MAGNESIA 400 MG/5ML suspension   Generic drug:  magnesium hydroxide     360 mL    Take 30-60 mLs by mouth daily as needed for constipation.    LLQ abdominal pain, Diverticulosis of colon       Multiple vitamin Caps      daily        TYLENOL PO           vitamin E 400 UNIT capsule     30 capsule    Two times weekly

## 2018-01-23 NOTE — PROGRESS NOTES
Hand Therapy Initial Evaluation    Current Date:  1/23/2018  Referring Physician: Dr. Hernadez    Subjective:  Umm Claros is a 57 year old right hand dominant female.    Diagnosis:Right Dequervains  DOI: 5/2017     Patient reports symptoms of pain, stiffness/loss of motion, weakness/loss of strength and edema of the right wrist which occurred due to an unknown etiology but probably related to gardening and gun shooting. Since onset symptoms are gradually getting better.  Special tests:  x-rays.  Previous treatment: OTC thumb spica.    General health as reported by patient is excellent.  Pertinent medical history includes:thyroid problems, menopausal, glaucoma  Medical allergies:Tramadol.  Surgical history: other: LSO, appendectomy, T & A.  Medication history: thyroid.    Occupational Profile Information:  Current occupation is retired  Barriers include:none  Prior functional level:  no limitations  Mobility: No difficulty  Transportation: drives  Leisure activities/hobbies: gardening, violin, strength training in gym    Upper Extremity Functional Index Score:  SCORE:   Column Totals: /80: 64   (A lower score indicates greater disability.)    Objective:  Pain:    VAS(0-10) 1/23/18   At Rest:  0/10   With Use:  7-8/10   At Worst  8/10     Report of Pain:  Location: right wrist and thumb   Description: Ache, Sharp, Shooting, Radiates into forearm  Frequency: Intermittent  Duration: Same all the time  Exacerbated by: Lifting, gripping, twisting, pulling, activity  Relieved by:  rest,  splints, massage, NSAIDs  Progression:  gradually improving,    ROM:  Wrist 1/23/18 1/23/18   AROM(PROM) Right Left   Extension 60 70   Flexion 70 80   RD 21 28   UD 45 45   UD with thumb Flexed 20++ 40     Thumb 1/23/18 1/23/18   AROM(PROM) Right Left   MP /50 /55   IP 60 /70   RAbd / /   PAbd     Retropulsion     Opposition  Kapandji Opposition Scale (0-10/10) 10/10 10/10     Strength:   (Measured in pounds)   1/23/18   Trials  Right Left   1  2  3  Av  50  50  53 45  47  48  47     Lat Pinch 18   Trials Right Left   1  2  3  Av 11     3 Pt.  Pinch 18   Trials Right Left   1  2  3  Av 10     Functional Exam:  - no pain, + mild, ++ moderate, +++ severe  MMT:  Right 18   Thumb EPL  +   Thumb EPB  -   Thumb APL -   Radial Deviation  ++   Ulnar Deviation  -   Wrist Ext -   Wrist Flex  +     Palpation:  Right 18   Radial Styloid  +   1st dorsal comp.  -     Special Tests:    Right 18   Finkelstein   +   Radial Nerve Tinels -     Assessment:  Patient presents with symptoms consistent with diagnosis of DeQuervains tendonitis, with conservative intervention.     Patient's limitations or Problem List includes:  Pain, Decreased ROM/motion, Weakness, Decreased  and pinch strength and tightness in musculature of the right wrist and thumb which interferes with the patient's ability to perform Recreational Activities and Household Chores as compared to previous level of function.    Rehab Potential:  Excellent - Return to full activity, no limitations    Patient will benefit from skilled Occupational Therapy to increase wrist and thumb ROM, flexibility,  strength and pinch strength and decrease pain to return to previous activity level and resume normal daily tasks and to reach their rehab potential.    Barriers to Learning:  No barrier    Communication Issues:  Patient appears to be able to clearly communicate and understand verbal and written communication and follow directions correctly.    Chart Review: Brief history including review of medical and/or therapy records relating to the presenting problem and Simple history review with patient    Identified Performance Deficits: home establishment and management, meal preparation and cleanup and leisure activities    Assessment of Occupational Performance:  1-3 Performance Deficits    Clinical Decision Making (Complexity): Low complexity    Treatment  Explanation:  The following has been discussed with the patient:    RX ordered/plan of care  Anticipated outcomes  Possible risks and side effects    Plan:  Frequency:  1 X week, once daily  Duration:  for 8 weeks    Treatment Plan:    Modalities:    US   Therapeutic Exercise:   AROM of wrist and thumb  PROM with stretch to wrist and thumb extensors   Manual Techniques:   Friction massage over 1st dorsal compartment  Myofascial release of the thumb extensors and flexors  Neuromuscular:   Radial nerve gliding   Chandler taping  Orthotic Fabrication:    Forearm based thumb spica orthosis if indicated  Self Care:   Ergonomic consideration   Diagnostic education    Discharge Plan:    Achieve all LTG.  Independent in home treatment program.  Reach maximal therapeutic benefit.    Home Program:   Warmth for stiffness  Ice after activity for pain  Self TFM to 1st dorsal compartment  Self MFR to EPB/ABL  AROM of wrist and thumb  PROM with stretch into simultaneous thumb flexion and ulnar deviation  Thumb spica orthosis for sleeping and per symptoms day  Avoid activities that exacerbate pain in the wrist or thumb    Next Visit:  US  A/PROM of wrist and thumb  MFR to extensors  TFM to 1st DC

## 2018-01-30 ENCOUNTER — THERAPY VISIT (OUTPATIENT)
Dept: OCCUPATIONAL THERAPY | Facility: CLINIC | Age: 58
End: 2018-01-30
Payer: COMMERCIAL

## 2018-01-30 DIAGNOSIS — M25.531 RIGHT WRIST PAIN: ICD-10-CM

## 2018-01-30 DIAGNOSIS — M65.4 RADIAL STYLOID TENOSYNOVITIS: ICD-10-CM

## 2018-01-30 PROCEDURE — 97035 APP MDLTY 1+ULTRASOUND EA 15: CPT | Mod: GO | Performed by: OCCUPATIONAL THERAPIST

## 2018-01-30 PROCEDURE — 97140 MANUAL THERAPY 1/> REGIONS: CPT | Mod: GO | Performed by: OCCUPATIONAL THERAPIST

## 2018-01-30 PROCEDURE — 97112 NEUROMUSCULAR REEDUCATION: CPT | Mod: GO | Performed by: OCCUPATIONAL THERAPIST

## 2018-01-30 NOTE — MR AVS SNAPSHOT
After Visit Summary   1/30/2018    Umm Claros    MRN: [id number]           Patient Information     Date Of Birth          1960        Visit Information        Provider Department      1/30/2018 2:00 PM Belem Rodriguez OT [facility]        Today's Diagnoses     Radial styloid tenosynovitis        Right wrist pain           Follow-ups after your visit        Your next 10 appointments already scheduled     Feb 06, 2018 11:30 AM CST   CALLI Hand with Belem Rodriguez OT   [facility] ([facility])    [address]  [facility] [address] [phone]            Feb 13, 2018 11:30 AM CST   CALLI Hand with Belem Rodriguez OT   [facility] ([facility])    [address]  [facility] [address] [phone]              Who to contact     If you have questions or need follow up information about today's clinic visit or your schedule please contact [facility] directly at [phone].  Normal or non-critical lab and imaging results will be communicated to you by Valensumhart, letter or phone within 4 business days after the clinic has received the results. If you do not hear from us within 7 days, please contact the clinic through Vivint Solart or phone. If you have a critical or abnormal lab result, we will notify you by phone as soon as possible.  Submit refill requests through QuickSolar or call your pharmacy and they will forward the refill request to us. Please allow 3 business days for your refill to be completed.          Additional Information About Your Visit        Valensumhart Information     QuickSolar gives you secure access to your electronic health record. If you see a primary care provider, you can also send messages to your care team and make appointments. If you have questions, please call your primary care clinic.  If you do not have a primary care provider, please call [phone] and they will assist  you.        Care EveryWhere ID     This is your Care EveryWhere ID. This could be used by other organizations to access your [place] medical records  [id number]        Your Vitals Were     Last Period                   04/13/2001            Blood Pressure from Last 3 Encounters:   01/15/18 120/76   11/06/17 110/66   08/07/17 118/79    Weight from Last 3 Encounters:   01/15/18 51.7 kg (114 lb)   11/06/17 51.7 kg (114 lb)   08/07/17 51.7 kg (114 lb)              We Performed the Following     MANUAL THER TECH,1+REGIONS,EA 15 MIN     NEUROMUSCULAR RE-EDUCATION     ULTRASOUND THERAPY        Primary Care Provider Office Phone # Fax #    Lindsay Padgett [phone]       [address] [address]        Equal Access to Services     CHAYO NOONAN : Hadii jean-paul goelo Soamilcar, waaxda luqadaha, qaybta kaalmada elfego, kenneth ma . So [facility] [phone].    ATENCIÓN: Si habla español, tiene a newman disposición servicios gratuitos de asistencia lingüística. Alfonso al [phone].    We comply with applicable federal civil rights laws and [place] laws. We do not discriminate on the basis of race, color, national origin, age, disability, sex, sexual orientation, or gender identity.            Thank you!     Thank you for choosing [facility]  for your care. Our goal is always to provide you with excellent care. Hearing back from our patients is one way we can continue to improve our services. Please take a few minutes to complete the written survey that you may receive in the mail after your visit with us. Thank you!             Your Updated Medication List - Protect others around you: Learn how to safely use, store and throw away your medicines at www.disposemymeds.org.          This list is accurate as of 1/30/18  2:47 PM.  Always use your most recent med list.                   Brand Name Dispense Instructions for use Diagnosis    ascorbic acid  500 MG tablet    VITAMIN C    0    1 TABLET TWICE DAILY        aspirin 81 MG EC tablet      Take 81 mg by mouth daily.        CALCIUM /VITAMIN D TABS   OR     0    1 TABLET DAILY        docusate sodium 100 MG tablet    COLACE    60 tablet    Take 100 mg by mouth daily.    LLQ abdominal pain, Diverticulosis of colon       fish oil-omega-3 fatty acids 1000 MG capsule      Take 1 g by mouth daily.        IBUPROFEN PO      Take 200 mg by mouth as needed.        levothyroxine 88 MCG tablet    SYNTHROID    90 tablet    Take 1 tablet (88 mcg) by mouth every morning    Hypothyroidism, unspecified type       magnesium 250 MG tablet      Take 1 tablet by mouth daily.        MILK OF MAGNESIA 400 MG/5ML suspension   Generic drug:  magnesium hydroxide     360 mL    Take 30-60 mLs by mouth daily as needed for constipation.    LLQ abdominal pain, Diverticulosis of colon       Multiple vitamin Caps      daily        TYLENOL PO           vitamin E 400 UNIT capsule     30 capsule    Two times weekly

## 2018-01-30 NOTE — PROGRESS NOTES
SOAP Note - Hand Therapy - Objective Information    Current Date:  2018  Referring Physician: Dr. Maricarmen GOMEZ Harlan is a 57 year old right hand dominant female.    Diagnosis:Right Dequervains  DOI: 2017     Patient reports symptoms of pain, stiffness/loss of motion, weakness/loss of strength and edema of the right wrist which occurred due to an unknown etiology but probably related to gardening and gun shooting.     S:  Subjective changes as noted by patient: The wrist is a little sore from the exercises.  Functional changes noted by patient: no changes      O:  Pain:    VAS(0-10) 18   At Rest:  0/10 0/10   With Use:  7-810 0-1/10   At Worst  810 7-810 twinges     Report of Pain:  Location: right wrist and thumb   Description: Ache, Sharp, Shooting, Radiates into forearm  Frequency: Intermittent  Duration: Same all the time  Exacerbated by: Lifting, gripping, twisting, pulling, activity  Relieved by:  rest,  splints, massage, NSAIDs  Progression:  gradually improving    ROM:  Wrist 18   AROM(PROM) Right Left   Extension 60 70   Flexion 70 80   RD 21 28   UD 45 45   UD with thumb Flexed 20++ 40     Thumb 18   AROM(PROM) Right Left   MP /50 /55   IP 60 /70   RAbd / /   PAbd     Retropulsion     Opposition  Kapandji Opposition Scale (0-10/10) 10/10 10/10     Strength:   (Measured in pounds)   18   Trials Right Left   1  2  3  Av  50  50  53 45  47  48  47     Lat Pinch 18   Trials Right Left   1  2  3  Av 11     3 Pt.  Pinch 18   Trials Right Left   1  2  3  Av 10     Functional Exam:  - no pain, + mild, ++ moderate, +++ severe  MMT:  Right 18   Thumb EPL  +   Thumb EPB  -   Thumb APL -   Radial Deviation  ++   Ulnar Deviation  -   Wrist Ext -   Wrist Flex  +     Palpation:  Right 18   Radial Styloid  +   1st dorsal comp.  -     Special Tests:    Right 18   Finkelstein   +   Radial Nerve Tinels -     Please refer  to the daily flowsheet for treatment provided today.     Home Program:   Warmth for stiffness  Ice after activity for pain  Self TFM to 1st dorsal compartment  Self MFR to EPB/ABL  AROM of wrist and thumb  PROM with stretch into simultaneous thumb flexion and ulnar deviation  Thumb spica orthosis for sleeping and per symptoms day  Avoid activities that exacerbate pain in the wrist or thumb  Trial of K-tape  Proximal radial nerve glides    Next Visit:  US  A/PROM of wrist and thumb  MFR to extensors  TFM to 1st DC

## 2018-02-06 ENCOUNTER — THERAPY VISIT (OUTPATIENT)
Dept: OCCUPATIONAL THERAPY | Facility: CLINIC | Age: 58
End: 2018-02-06
Payer: COMMERCIAL

## 2018-02-06 DIAGNOSIS — M65.4 RADIAL STYLOID TENOSYNOVITIS: ICD-10-CM

## 2018-02-06 DIAGNOSIS — M25.531 RIGHT WRIST PAIN: ICD-10-CM

## 2018-02-06 PROCEDURE — 97110 THERAPEUTIC EXERCISES: CPT | Mod: GO | Performed by: OCCUPATIONAL THERAPIST

## 2018-02-06 PROCEDURE — 97140 MANUAL THERAPY 1/> REGIONS: CPT | Mod: GO | Performed by: OCCUPATIONAL THERAPIST

## 2018-02-06 PROCEDURE — 97035 APP MDLTY 1+ULTRASOUND EA 15: CPT | Mod: GO | Performed by: OCCUPATIONAL THERAPIST

## 2018-02-06 NOTE — PROGRESS NOTES
SOAP Note - Hand Therapy - Objective Information    Current Date:  2018  Referring Physician: Dr. Maricarmen Leaa PATRICIA Harlan is a 57 year old right hand dominant female.    Diagnosis:Right Dequervains  DOI: 2017     Patient reports symptoms of pain, stiffness/loss of motion, weakness/loss of strength and edema of the right wrist which occurred due to an unknown etiology but probably related to gardening and gun shooting.     S:  Subjective changes as noted by patient: Overall I think the wrist is getting a little bit better. Patient reports getting pain going up the arm when ulnar stress in applied to the IF. The tape helped with the pain.  Functional changes noted by patient: no changes      O:  Pain:    VAS(0-10) 18   At Rest:  0/10 0/10 0/10   With Use:  7-810 0-1/10 1/10   At Worst  8/10 7-810 twinges 6-7/10 twinges     Report of Pain:  Location: right wrist and thumb   Description: Ache, Sharp, Shooting, Radiates into forearm  Frequency: Intermittent  Duration: Same all the time  Exacerbated by: Lifting, gripping, twisting, pulling, activity  Relieved by:  rest,  splints, massage, NSAIDs  Progression:  gradually improving    ROM:  Wrist 18   AROM(PROM) Right Left Right   Extension 60 70    Flexion 70 80    RD 21 28    UD 45 45    UD with thumb Flexed 20++ 40 25+     Thumb 18   AROM(PROM) Right Left   MP /50 /55   IP 60 /70   RAbd / /   PAbd     Retropulsion     Opposition  Kapandji Opposition Scale (0-10/10) 10/10 10/10     Strength:   (Measured in pounds)   18   Trials Right Left   1  2  3  Av  50  50  53 45  47  48  47     Lat Pinch 18   Trials Right Left   1  2  3  Av 11     3 Pt.  Pinch 18   Trials Right Left   1  2  3  Av 10     Functional Exam:  - no pain, + mild, ++ moderate, +++ severe  MMT:  Right 18   Thumb EPL  +   Thumb EPB  -   Thumb APL -   Radial Deviation  ++   Ulnar Deviation  -   Wrist Ext -    Wrist Flex  +     Palpation:  Right 1/23/18   Radial Styloid  +   1st dorsal comp.  -     Special Tests:    Right 1/23/18   Finkelstein   +   Radial Nerve Tinels -     Please refer to the daily flowsheet for treatment provided today.     Home Program:   Warmth for stiffness  Ice after activity for pain  Self TFM to 1st dorsal compartment  Self MFR to EPB/ABL  AROM of wrist and thumb  PROM with stretch into simultaneous thumb flexion and ulnar deviation  Thumb spica orthosis for sleeping and per symptoms day  Avoid activities that exacerbate pain in the wrist or thumb  Proximal radial nerve glides    Next Visit:  US  A/PROM of wrist and thumb  MFR to extensors  TFM to 1st DC

## 2018-02-06 NOTE — MR AVS SNAPSHOT
After Visit Summary   2/6/2018    Umm Claros    MRN: [id number]           Patient Information     Date Of Birth          1960        Visit Information        Provider Department      2/6/2018 11:30 AM Belem Rodriguez OT [facility]        Today's Diagnoses     Radial styloid tenosynovitis        Right wrist pain           Follow-ups after your visit        Your next 10 appointments already scheduled     Feb 13, 2018 11:30 AM CST   CALLI Hand with Belem Rodriguez OT   [facility] ([facility])    [address]  [facility] [id number] [phone]            Feb 26, 2018  9:40 AM CST   PHYSICAL with Lindsay Padgett MD   [facility] ([facility])    [address]  [facility] [address] [phone]              Who to contact     If you have questions or need follow up information about today's clinic visit or your schedule please contact [facility] directly at [phone].  Normal or non-critical lab and imaging results will be communicated to you by SYLLETAhart, letter or phone within 4 business days after the clinic has received the results. If you do not hear from us within 7 days, please contact the clinic through [email or web address] or phone. If you have a critical or abnormal lab result, we will notify you by phone as soon as possible.  Submit refill requests through SmartEquip or call your pharmacy and they will forward the refill request to us. Please allow 3 business days for your refill to be completed.          Additional Information About Your Visit        SYLLETAhart Information     SmartEquip gives you secure access to your electronic health record. If you see a primary care provider, you can also send messages to your care team and make appointments. If you have questions, please call your primary care clinic.  If you do not have a primary care provider, please call [phone] and they will assist  you.        Care EveryWhere ID     This is your Care EveryWhere ID. This could be used by other organizations to access your [place] medical records  [id number]        Your Vitals Were     Last Period                   04/13/2001            Blood Pressure from Last 3 Encounters:   01/15/18 120/76   11/06/17 110/66   08/07/17 118/79    Weight from Last 3 Encounters:   01/15/18 51.7 kg (114 lb)   11/06/17 51.7 kg (114 lb)   08/07/17 51.7 kg (114 lb)              We Performed the Following     MANUAL THER TECH,1+REGIONS,EA 15 MIN     THERAPEUTIC EXERCISES     ULTRASOUND THERAPY        Primary Care Provider Office Phone # Fax #    Lindsay Padgett [phone]       [address] [address]        Equal Access to Services     [facility] : Hadii jean-paul goelo Soamilcar, waaxda luqadaha, qaybta kaalmada elfego, kenneth ma . So [facility] [phone].    ATENCIÓN: Si habla español, tiene a newman disposición servicios gratuitos de asistencia lingüística. Alfonso al [phone].    We comply with applicable federal civil rights laws and [place] laws. We do not discriminate on the basis of race, color, national origin, age, disability, sex, sexual orientation, or gender identity.            Thank you!     Thank you for choosing [facility]  for your care. Our goal is always to provide you with excellent care. Hearing back from our patients is one way we can continue to improve our services. Please take a few minutes to complete the written survey that you may receive in the mail after your visit with us. Thank you!             Your Updated Medication List - Protect others around you: Learn how to safely use, store and throw away your medicines at www.disposemymeds.org.          This list is accurate as of 2/6/18 12:00 PM.  Always use your most recent med list.                   Brand Name Dispense Instructions for use Diagnosis    ascorbic acid 500 MG  tablet    VITAMIN C    0    1 TABLET TWICE DAILY        aspirin 81 MG EC tablet      Take 81 mg by mouth daily.        CALCIUM /VITAMIN D TABS   OR     0    1 TABLET DAILY        docusate sodium 100 MG tablet    COLACE    60 tablet    Take 100 mg by mouth daily.    LLQ abdominal pain, Diverticulosis of colon       fish oil-omega-3 fatty acids 1000 MG capsule      Take 1 g by mouth daily.        IBUPROFEN PO      Take 200 mg by mouth as needed.        levothyroxine 88 MCG tablet    SYNTHROID    90 tablet    Take 1 tablet (88 mcg) by mouth every morning    Hypothyroidism, unspecified type       magnesium 250 MG tablet      Take 1 tablet by mouth daily.        MILK OF MAGNESIA 400 MG/5ML suspension   Generic drug:  magnesium hydroxide     360 mL    Take 30-60 mLs by mouth daily as needed for constipation.    LLQ abdominal pain, Diverticulosis of colon       Multiple vitamin Caps      daily        TYLENOL PO           vitamin E 400 UNIT capsule     30 capsule    Two times weekly

## 2018-02-13 ENCOUNTER — THERAPY VISIT (OUTPATIENT)
Dept: OCCUPATIONAL THERAPY | Facility: CLINIC | Age: 58
End: 2018-02-13
Payer: COMMERCIAL

## 2018-02-13 DIAGNOSIS — M25.531 RIGHT WRIST PAIN: ICD-10-CM

## 2018-02-13 DIAGNOSIS — M65.4 RADIAL STYLOID TENOSYNOVITIS: ICD-10-CM

## 2018-02-13 PROCEDURE — 97035 APP MDLTY 1+ULTRASOUND EA 15: CPT | Mod: GO | Performed by: OCCUPATIONAL THERAPIST

## 2018-02-13 PROCEDURE — 97140 MANUAL THERAPY 1/> REGIONS: CPT | Mod: GO | Performed by: OCCUPATIONAL THERAPIST

## 2018-02-13 PROCEDURE — 97112 NEUROMUSCULAR REEDUCATION: CPT | Mod: GO | Performed by: OCCUPATIONAL THERAPIST

## 2018-02-13 NOTE — MR AVS SNAPSHOT
After Visit Summary   2/13/2018    Umm Claros    MRN: [id number]           Patient Information     Date Of Birth          1960        Visit Information        Provider Department      2/13/2018 11:30 AM Belem Rodriguez OT [facility]        Today's Diagnoses     Radial styloid tenosynovitis        Right wrist pain           Follow-ups after your visit        Your next 10 appointments already scheduled     Feb 20, 2018  9:00 AM CST   CALLI Hand with Belem Rodriguez OT   [facility] ([facility])    [address]  [facility] [address]   [phone]            Feb 26, 2018  9:40 AM CST   PHYSICAL with Lindsay Padgett MD   [facility] ([facility])    [address]  [facility] [address]   [phone]            Feb 27, 2018 11:30 AM CST   CALLI Hand with Belem Rodriguez OT   [facility] ([facility])    [address]  [facility] [address]   [phone]              Who to contact     If you have questions or need follow up information about today's clinic visit or your schedule please contact [facility] directly at [phone].  Normal or non-critical lab and imaging results will be communicated to you by Nutrinsichart, letter or phone within 4 business days after the clinic has received the results. If you do not hear from us within 7 days, please contact the clinic through Nutrinsichart or phone. If you have a critical or abnormal lab result, we will notify you by phone as soon as possible.  Submit refill requests through Digitrad Communications or call your pharmacy and they will forward the refill request to us. Please allow 3 business days for your refill to be completed.          Additional Information About Your Visit        Nutrinsichart Information     Digitrad Communications gives you secure access to your electronic health record. If you see a primary care provider, you can also send  messages to your care team and make appointments. If you have questions, please call your primary care clinic.  If you do not have a primary care provider, please call [phone] and they will assist you.        Care EveryWhere ID     This is your Care EveryWhere ID. This could be used by other organizations to access your [place] medical records  [id number]        Your Vitals Were     Last Period                   04/13/2001            Blood Pressure from Last 3 Encounters:   01/15/18 120/76   11/06/17 110/66   08/07/17 118/79    Weight from Last 3 Encounters:   01/15/18 51.7 kg (114 lb)   11/06/17 51.7 kg (114 lb)   08/07/17 51.7 kg (114 lb)              We Performed the Following     MANUAL THER TECH,1+REGIONS,EA 15 MIN     NEUROMUSCULAR RE-EDUCATION     ULTRASOUND THERAPY        Primary Care Provider Office Phone # Fax #    Lindsay Padgett [phone]       [address] [facility] [address]  [facility] [address]        Equal Access to Services     ANGIE [facility] SystemPRASAD : Hadii jean-paul ku hadasho Soomaali, waaxda luqadaha, qaybta kaalmada adeegyada, waxay julio ma . So [facility] [phone].    ATENCIÓN: Si habla español, tiene a newman disposición servicios gratuitos de asistencia lingüística. [facility] [phone].    We comply with applicable federal civil rights laws and [place] laws. We do not discriminate on the basis of race, color, national origin, age, disability, sex, sexual orientation, or gender identity.            Thank you!     Thank you for choosing [facility]  for your care. Our goal is always to provide you with excellent care. Hearing back from our patients is one way we can continue to improve our services. Please take a few minutes to complete the written survey that you may receive in the mail after your visit with us. Thank you!             Your Updated Medication List - Protect others around you: Learn how to safely use, store and throw away your medicines at  www.disposemymeds.org.          This list is accurate as of 2/13/18 12:05 PM.  Always use your most recent med list.                   Brand Name Dispense Instructions for use Diagnosis    ascorbic acid 500 MG tablet    VITAMIN C    0    1 TABLET TWICE DAILY        aspirin 81 MG EC tablet      Take 81 mg by mouth daily.        CALCIUM /VITAMIN D TABS   OR     0    1 TABLET DAILY        docusate sodium 100 MG tablet    COLACE    60 tablet    Take 100 mg by mouth daily.    LLQ abdominal pain, Diverticulosis of colon       fish oil-omega-3 fatty acids 1000 MG capsule      Take 1 g by mouth daily.        IBUPROFEN PO      Take 200 mg by mouth as needed.        levothyroxine 88 MCG tablet    SYNTHROID    90 tablet    Take 1 tablet (88 mcg) by mouth every morning    Hypothyroidism, unspecified type       magnesium 250 MG tablet      Take 1 tablet by mouth daily.        MILK OF MAGNESIA 400 MG/5ML suspension   Generic drug:  magnesium hydroxide     360 mL    Take 30-60 mLs by mouth daily as needed for constipation.    LLQ abdominal pain, Diverticulosis of colon       Multiple vitamin Caps      daily        TYLENOL PO           vitamin E 400 UNIT capsule     30 capsule    Two times weekly

## 2018-02-13 NOTE — PROGRESS NOTES
SOAP Note - Hand Therapy - Objective Information    Current Date:  2018  Referring Physician: Dr. Maricarmen GOMEZ Harlan is a 57 year old right hand dominant female.    Diagnosis:Right Dequervains  DOI: 2017     Patient reports symptoms of pain, stiffness/loss of motion, weakness/loss of strength and edema of the right wrist which occurred due to an unknown etiology but probably related to gardening and gun shooting.     S:  Subjective changes as noted by patient:  The hand was better yesterday than today.  But I had my grandaughter yesterday so I may have lifted her too many times  Functional changes noted by patient: no changes      O:  Pain:    VAS(0-10) 18   At Rest:  0/10 0/10 0/10   With Use:  7-810 0-1/10 1-4/10   At Worst  8/10 7-810 twinges 4-6/10 twinges     Report of Pain:  Location: right wrist and thumb   Description: Ache, Sharp, Shooting, Radiates into forearm  Frequency: Intermittent  Duration: Same all the time  Exacerbated by: Lifting, gripping, twisting, pulling, activity  Relieved by:  rest,  splints, massage, NSAIDs  Progression:  gradually improving    ROM:  Wrist 18   AROM(PROM) Right Left Right   Extension 60 70    Flexion 70 80    RD 21 28    UD 45 45    UD with thumb Flexed 20++ 40 25+     Thumb 18   AROM(PROM) Right Left   MP /50 /55   IP 60 /70   RAbd / /   PAbd     Retropulsion     Opposition  Kapandji Opposition Scale (0-10/10) 10/10 10/10     Strength:   (Measured in pounds)   18   Trials Right Left   1  2  3  Av  50  50  53 45  47  48  47     Lat Pinch 18   Trials Right Left   1  2  3  Av 11     3 Pt.  Pinch 18   Trials Right Left   1  2  3  Av 10     Functional Exam:  - no pain, + mild, ++ moderate, +++ severe  MMT:  Right 18   Thumb EPL  + -   Thumb EPB  - +   Thumb APL - -   Radial Deviation  ++ ++   Ulnar Deviation  - -   Wrist Ext - -   Wrist Flex  + -      Palpation:  Right 1/23/18 2/13/18   Radial Styloid  + +   1st dorsal comp.  - -     Special Tests:    Right 1/23/18 2/13/18   Finkelstein   + +   Radial Nerve Tinels - -     Please refer to the daily flowsheet for treatment provided today.     Home Program:   Warmth for stiffness  Ice after activity for pain  Self TFM to 1st dorsal compartment  Self MFR to EPB/ABL  AROM of wrist and thumb  PROM with stretch into simultaneous thumb flexion and ulnar deviation  Thumb spica orthosis for sleeping and per symptoms day  Avoid activities that exacerbate pain in the wrist or thumb  Proximal radial nerve glides    Next Visit:  US  A/PROM of wrist and thumb  MFR to extensors  TFM to 1st DC

## 2018-02-20 NOTE — PROGRESS NOTES
SUBJECTIVE:   CC: Umm Claros is an 57 year old woman who presents for preventive health visit.     Physical   Annual:     Getting at least 3 servings of Calcium per day::  Yes    Bi-annual eye exam::  Yes    Dental care twice a year::  NO    Sleep apnea or symptoms of sleep apnea::  None    Diet::  Regular (no restrictions)    Frequency of exercise::  4-5 days/week    Duration of exercise::  45-60 minutes    Taking medications regularly::  Yes    Medication side effects::  None    Additional concerns today::  YES          Answers for HPI/ROS submitted by the patient on 2/26/2018   PHQ-2 Score: 0    Today's PHQ-2 Score:   PHQ-2 ( 1999 [employer]) 2/26/2018   Q1: Little interest or pleasure in doing things 0   Q2: Feeling down, depressed or hopeless 0   PHQ-2 Score 0   Q1: Little interest or pleasure in doing things Not at all   Q2: Feeling down, depressed or hopeless Not at all   PHQ-2 Score 0       Abuse: Current or Past(Physical, Sexual or Emotional)- No  Do you feel safe in your environment - Yes    Social History   Substance Use Topics     Smoking status: Former Smoker     Years: 7.00     Quit date: 4/1/1993     Smokeless tobacco: Never Used     Alcohol use Yes      Comment: 3-5 drinks per week     Reviewed orders with patient.  Reviewed health maintenance and updated orders accordingly - Yes  Labs reviewed in EPIC  BP Readings from Last 3 Encounters:   02/26/18 118/72   01/15/18 120/76   11/06/17 110/66    Wt Readings from Last 3 Encounters:   02/26/18 113 lb (51.3 kg)   01/15/18 114 lb (51.7 kg)   11/06/17 114 lb (51.7 kg)                  Patient Active Problem List   Diagnosis     Hypothyroidism     Intradermal nevus     Osteopenia of prematurity     Chronic constipation     Diverticulitis of colon     Advance Care Planning     De Quervain's disease (tenosynovitis)     Radial styloid tenosynovitis     Right wrist pain     Vaginal atrophy     Past Surgical History:   Procedure Laterality Date     C  APPENDECTOMY  05/26/00    Laparoscopic drainage of left ovarian cyst and lysis of adhesions, dilatation and curettage, and laparoscopic appendectomy performed by Dr. Ryan     COLONOSCOPY  4/10/2012    Procedure:COLONOSCOPY; Colonoscopy; Surgeon:BELL LAYTON; Location:[facility]     HC REMOVAL OF OVARY/TUBE(S)  01/30/01    Laparoscopic left sided salpingo-oophorectomy. Cystoscopy     HC REMOVE TONSILS/ADENOIDS,<13 Y/O      T & A <12y.o.       Social History   Substance Use Topics     Smoking status: Former Smoker     Years: 7.00     Quit date: 4/1/1993     Smokeless tobacco: Never Used     Alcohol use Yes      Comment: 3-5 drinks per week     Family History   Problem Relation Age of Onset     CANCER Paternal Grandmother      breast cancer     Gynecology Mother      endometriosis     CANCER Father      prostate cancer     Alcohol/Drug Father      alcohol problems and smoker         Current Outpatient Prescriptions   Medication Sig Dispense Refill     latanoprost (XALATAN) 0.005 % ophthalmic solution daily  1     ALPRAZolam (XANAX) 0.25 MG tablet Take 1-2 tablets (0.25-0.5 mg) by mouth as needed for anxiety 20 tablet 0     levothyroxine (SYNTHROID) 88 MCG tablet Take 1 tablet (88 mcg) by mouth every morning 90 tablet 3     Acetaminophen (TYLENOL PO)        vitamin E 400 UNIT capsule Two times weekly 30 capsule 0     docusate sodium 100 MG tablet Take 100 mg by mouth daily. 60 tablet 1     magnesium hydroxide (MILK OF MAGNESIA) 400 MG/5ML suspension Take 30-60 mLs by mouth daily as needed for constipation. 360 mL 1     IBUPROFEN PO Take 200 mg by mouth as needed.       aspirin 81 MG EC tablet Take 81 mg by mouth daily.       fish oil-omega-3 fatty acids (FISH OIL) 1000 MG capsule Take 1 g by mouth daily.       Magnesium 250 MG tablet Take 1 tablet by mouth daily.       MULTIPLE VITAMIN CAPS   OR daily  0     CALCIUM /VITAMIN D TABS   OR 1 TABLET DAILY 0 0     VITAMIN C TABS 500 MG OR 1 TABLET TWICE DAILY 0 0      Allergies   Allergen Reactions     No Known Allergies      Tramadol      Other reaction(s): Other - Describe In Comment Field  Dizzy and double vision     Recent Labs   Lab Test  08/07/17   1230  02/13/17   1422  11/21/16   0859  09/29/16   1419  03/11/16   1040   12/16/12   1435   02/16/12   0813   LDL   --    --   82   --    --    --    --    --   82   HDL   --    --   98   --    --    --    --    --   83   TRIG   --    --   56   --    --    --    --    --   78   ALT  19   --    --   18   --    --   22   --    --    CR  0.78   --    --   0.74   --    --   0.88   < >   --    GFRESTIMATED  76   --    --   81   --    --   67   < >   --    GFRESTBLACK  >90   GFR Calc     --    --   >90   GFR Calc     --    --   82   < >   --    POTASSIUM  4.2   --    --   3.6   --    --   4.4   < >   --    TSH   --   1.67   --    --   0.55   < >   --    --   1.59    < > = values in this interval not displayed.        Patient over age 50, mutual decision to screen reflected in health maintenance.    Pertinent mammograms are reviewed under the imaging tab.  History of abnormal Pap smear: NO - age 30- 65 PAP every 3 years recommended    Reviewed and updated as needed this visit by clinical staff  Tobacco  Allergies  Meds  Problems         Reviewed and updated as needed this visit by Provider  Allergies  Meds  Problems          Past Medical History:   Diagnosis Date     Anxiety state, unspecified      Depressive disorder, not elsewhere classified     depression     Raynaud's syndrome      Tobacco use disorder      Unspecified hypothyroidism       Past Surgical History:   Procedure Laterality Date     C APPENDECTOMY  05/26/00    Laparoscopic drainage of left ovarian cyst and lysis of adhesions, dilatation and curettage, and laparoscopic appendectomy performed by Dr. Ryan     COLONOSCOPY  4/10/2012    Procedure:COLONOSCOPY; Colonoscopy; Surgeon:BELL LAYTON; Location: [facility]     HC REMOVAL  "OF OVARY/TUBE(S)  01/30/01    Laparoscopic left sided salpingo-oophorectomy. Cystoscopy     HC REMOVE TONSILS/ADENOIDS,<13 Y/O      T & A <12y.o.       Review of Systems   Constitutional: Negative for chills and fever.   HENT: Negative for congestion, ear pain, hearing loss and sore throat.    Eyes: Negative for pain and visual disturbance.   Respiratory: Negative for cough and shortness of breath.    Cardiovascular: Negative for chest pain, palpitations and peripheral edema.   Gastrointestinal: Negative for abdominal pain, constipation, diarrhea, heartburn, hematochezia and nausea.   Endocrine: Negative.    Genitourinary: Positive for frequency. Negative for dysuria, genital sores, hematuria, pelvic pain, urgency and vaginal discharge.   Musculoskeletal: Negative for arthralgias, joint swelling and myalgias.   Skin: Negative for rash.   Allergic/Immunologic: Negative.    Neurological: Negative for dizziness, weakness, headaches and paresthesias.   Psychiatric/Behavioral: Negative for mood changes. The patient is not nervous/anxious.           OBJECTIVE:   /72 (BP Location: Left arm, Patient Position: Chair, Cuff Size: Adult Regular)  Pulse 61  Temp 97.5  F (36.4  C) (Oral)  Resp 16  Ht 5' 3\" (1.6 m)  Wt 113 lb (51.3 kg)  LMP 04/13/2001  SpO2 98%  BMI 20.02 kg/m2  Physical Exam   Constitutional: She is oriented to person, place, and time. She appears well-developed and well-nourished. No distress.   HENT:   Right Ear: External ear normal.   Left Ear: External ear normal.   Nose: Nose normal.   Mouth/Throat: Oropharynx is clear and moist. No oropharyngeal exudate.   Eyes: Conjunctivae are normal. Pupils are equal, round, and reactive to light. Right eye exhibits no discharge. Left eye exhibits no discharge.   Neck: Neck supple. No tracheal deviation present. No thyromegaly present.   Cardiovascular: Normal rate, regular rhythm, S1 normal, S2 normal, normal heart sounds and normal pulses.  Exam reveals " no S3, no S4 and no friction rub.    No murmur heard.  Pulmonary/Chest: Effort normal and breath sounds normal. No respiratory distress. She has no wheezes. She has no rales.   Abdominal: Soft. Bowel sounds are normal. She exhibits no mass. There is no hepatosplenomegaly. There is no tenderness.   Genitourinary: Vagina normal and uterus normal. No breast swelling, tenderness or discharge. Cervix exhibits no motion tenderness and no discharge. No vaginal discharge found.   Musculoskeletal: Normal range of motion. She exhibits no edema.   Lymphadenopathy:     She has no cervical adenopathy.   Neurological: She is alert and oriented to person, place, and time. She has normal strength and normal reflexes. She exhibits normal muscle tone.   Skin: Skin is warm and dry. No rash noted.   Psychiatric: She has a normal mood and affect. Judgment and thought content normal. Cognition and memory are normal.         ASSESSMENT/PLAN:     Problem List Items Addressed This Visit        SPECIALTY PROBLEM LIST    Vaginal atrophy     Discussed medications for vaginal atrophy. Pt would like to think before she would proceed. Can sen script for topical estrogen            Other    Hypothyroidism     Recheck levels   Adjust dose accordingly         Relevant Orders    TSH WITH FREE T4 REFLEX (Completed)    Lipid panel reflex to direct LDL Fasting (Completed)      Other Visit Diagnoses     Encounter for routine adult health examination with abnormal findings    -  Primary    Screening for malignant neoplasm of cervix        Relevant Orders    Pap imaged thin layer screen with HPV - recommended age 30 - 65 years (select HPV order below) (Completed)    HPV High Risk Types DNA Cervical (Completed)    Anxiety        Relevant Medications    ALPRAZolam (XANAX) 0.25 MG tablet            COUNSELING:  Reviewed preventive health counseling, as reflected in patient instructions       Regular exercise       Healthy diet/nutrition       Vision  "screening       Hearing screening         reports that she quit smoking about 24 years ago. She quit after 7.00 years of use. She has never used smokeless tobacco.    Estimated body mass index is 20.02 kg/(m^2) as calculated from the following:    Height as of this encounter: 5' 3\" (1.6 m).    Weight as of this encounter: 113 lb (51.3 kg).       Counseling Resources:  ATP IV Guidelines  Pooled Cohorts Equation Calculator  Breast Cancer Risk Calculator  FRAX Risk Assessment  ICSI Preventive Guidelines  Dietary Guidelines for Americans, 2010  USDA's MyPlate  ASA Prophylaxis  Lung CA Screening    Lindsay Padgett MD  [facility]  "

## 2018-02-23 ENCOUNTER — HOSPITAL ENCOUNTER (OUTPATIENT)
Dept: MAMMOGRAPHY | Facility: CLINIC | Age: 58
Discharge: HOME | End: 2018-02-23
Attending: FAMILY MEDICINE | Admitting: FAMILY MEDICINE
Payer: COMMERCIAL

## 2018-02-23 DIAGNOSIS — Z12.31 VISIT FOR SCREENING MAMMOGRAM: ICD-10-CM

## 2018-02-23 PROCEDURE — 77063 BREAST TOMOSYNTHESIS BI: CPT

## 2018-02-26 ENCOUNTER — OFFICE VISIT (OUTPATIENT)
Dept: FAMILY MEDICINE | Facility: OTHER | Age: 58
End: 2018-02-26
Payer: COMMERCIAL

## 2018-02-26 VITALS
OXYGEN SATURATION: 98 % | BODY MASS INDEX: 20.02 KG/M2 | HEIGHT: 63 IN | DIASTOLIC BLOOD PRESSURE: 72 MMHG | WEIGHT: 113 LBS | TEMPERATURE: 97.5 F | HEART RATE: 61 BPM | SYSTOLIC BLOOD PRESSURE: 118 MMHG | RESPIRATION RATE: 16 BRPM

## 2018-02-26 DIAGNOSIS — E03.9 HYPOTHYROIDISM, UNSPECIFIED TYPE: ICD-10-CM

## 2018-02-26 DIAGNOSIS — Z00.01 ENCOUNTER FOR ROUTINE ADULT HEALTH EXAMINATION WITH ABNORMAL FINDINGS: Primary | ICD-10-CM

## 2018-02-26 DIAGNOSIS — Z12.4 SCREENING FOR MALIGNANT NEOPLASM OF CERVIX: ICD-10-CM

## 2018-02-26 DIAGNOSIS — N95.2 VAGINAL ATROPHY: ICD-10-CM

## 2018-02-26 DIAGNOSIS — F41.9 ANXIETY: ICD-10-CM

## 2018-02-26 LAB
CHOLEST SERPL-MCNC: 181 MG/DL
HDLC SERPL-MCNC: 107 MG/DL
LDLC SERPL CALC-MCNC: 63 MG/DL
NONHDLC SERPL-MCNC: 74 MG/DL
TRIGL SERPL-MCNC: 53 MG/DL
TSH SERPL DL<=0.005 MIU/L-ACNC: 1.66 MU/L (ref 0.4–4)

## 2018-02-26 PROCEDURE — 99396 PREV VISIT EST AGE 40-64: CPT | Performed by: FAMILY MEDICINE

## 2018-02-26 PROCEDURE — 87624 HPV HI-RISK TYP POOLED RSLT: CPT | Performed by: FAMILY MEDICINE

## 2018-02-26 PROCEDURE — 80061 LIPID PANEL: CPT | Performed by: FAMILY MEDICINE

## 2018-02-26 PROCEDURE — G0145 SCR C/V CYTO,THINLAYER,RESCR: HCPCS | Performed by: FAMILY MEDICINE

## 2018-02-26 PROCEDURE — G0476 HPV COMBO ASSAY CA SCREEN: HCPCS | Performed by: FAMILY MEDICINE

## 2018-02-26 PROCEDURE — 36415 COLL VENOUS BLD VENIPUNCTURE: CPT | Performed by: FAMILY MEDICINE

## 2018-02-26 PROCEDURE — 84443 ASSAY THYROID STIM HORMONE: CPT | Performed by: FAMILY MEDICINE

## 2018-02-26 RX ORDER — ALPRAZOLAM 0.25 MG
.25-.5 TABLET ORAL PRN
Qty: 20 TABLET | Refills: 0 | Status: SHIPPED | OUTPATIENT
Start: 2018-02-26 | End: 2020-11-02

## 2018-02-26 RX ORDER — LATANOPROST 50 UG/ML
SOLUTION/ DROPS OPHTHALMIC DAILY
Refills: 1 | COMMUNITY
Start: 2018-02-06

## 2018-02-26 ASSESSMENT — ENCOUNTER SYMPTOMS
WEAKNESS: 0
HEMATURIA: 0
FREQUENCY: 1
ALLERGIC/IMMUNOLOGIC NEGATIVE: 1
SORE THROAT: 0
HEARTBURN: 0
PARESTHESIAS: 0
EYE PAIN: 0
SHORTNESS OF BREATH: 0
COUGH: 0
HEMATOCHEZIA: 0
DYSURIA: 0
ARTHRALGIAS: 0
MYALGIAS: 0
JOINT SWELLING: 0
ENDOCRINE NEGATIVE: 1
CHILLS: 0
FEVER: 0
NAUSEA: 0
DIARRHEA: 0
CONSTIPATION: 0
ABDOMINAL PAIN: 0
HEADACHES: 0
DIZZINESS: 0
NERVOUS/ANXIOUS: 0
PALPITATIONS: 0

## 2018-02-26 ASSESSMENT — PAIN SCALES - GENERAL: PAINLEVEL: NO PAIN (0)

## 2018-02-26 NOTE — ASSESSMENT & PLAN NOTE
Discussed medications for vaginal atrophy. Pt would like to think before she would proceed. Can sen script for topical estrogen

## 2018-02-26 NOTE — NURSING NOTE
"Chief Complaint   Patient presents with     Physical     Panel Management     Height, pap, TSH       Initial /72 (BP Location: Left arm, Patient Position: Chair, Cuff Size: Adult Regular)  Pulse 61  Temp 97.5  F (36.4  C) (Oral)  Resp 16  Ht 5' 3\" (1.6 m)  Wt 113 lb (51.3 kg)  LMP 04/13/2001  SpO2 98%  BMI 20.02 kg/m2 Estimated body mass index is 20.02 kg/(m^2) as calculated from the following:    Height as of this encounter: 5' 3\" (1.6 m).    Weight as of this encounter: 113 lb (51.3 kg).  Medication Reconciliation: complete   Kelly Sewell CMA (AAMA)      "

## 2018-02-26 NOTE — MR AVS SNAPSHOT
After Visit Summary   2/26/2018    Umm Claros    MRN: [id number]           Patient Information     Date Of Birth          1960        Visit Information        Provider Department      2/26/2018 9:40 AM Lindsay Padgett MD [facility]        Today's Diagnoses     Screening for malignant neoplasm of cervix    -  1    Hypothyroidism, unspecified type        Anxiety          Care Instructions      Preventive Health Recommendations  Female Ages 50 - 64    Yearly exam: See your health care provider every year in order to  o Review health changes.   o Discuss preventive care.    o Review your medicines if your doctor has prescribed any.      Get a Pap test every three years (unless you have an abnormal result and your provider advises testing more often).    If you get Pap tests with HPV test, you only need to test every 5 years, unless you have an abnormal result.     You do not need a Pap test if your uterus was removed (hysterectomy) and you have not had cancer.    You should be tested each year for STDs (sexually transmitted diseases) if you're at risk.     Have a mammogram every 1 to 2 years.    Have a colonoscopy at age 50, or have a yearly FIT test (stool test). These exams screen for colon cancer.      Have a cholesterol test every 5 years, or more often if advised.    Have a diabetes test (fasting glucose) every three years. If you are at risk for diabetes, you should have this test more often.     If you are at risk for osteoporosis (brittle bone disease), think about having a bone density scan (DEXA).    Shots: Get a flu shot each year. Get a tetanus shot every 10 years.    Nutrition:     Eat at least 5 servings of fruits and vegetables each day.    Eat whole-grain bread, whole-wheat pasta and brown rice instead of white grains and rice.    Talk to your provider about Calcium and Vitamin D.     Lifestyle    Exercise at least 150 minutes a week (30 minutes a day, 5 days a  week). This will help you control your weight and prevent disease.    Limit alcohol to one drink per day.    No smoking.     Wear sunscreen to prevent skin cancer.     See your dentist every six months for an exam and cleaning.    See your eye doctor every 1 to 2 years.            Follow-ups after your visit        Follow-up notes from your care team     Return in about 6 months (around 8/26/2018).      Your next 10 appointments already scheduled     Feb 27, 2018 11:30 AM SELINA MCCURDY Hand with Belem Rodriguez OT   [facility]    [address]  [facility] [address] [phone]              Who to contact     If you have questions or need follow up information about today's clinic visit or your schedule please contact [facility] directly at [phone].  Normal or non-critical lab and imaging results will be communicated to you by MyChart, letter or phone within 4 business days after the clinic has received the results. If you do not hear from us within 7 days, please contact the clinic through [employer] or phone. If you have a critical or abnormal lab result, we will notify you by phone as soon as possible.  Submit refill requests through [employer] or call your pharmacy and they will forward the refill request to us. Please allow 3 business days for your refill to be completed.          Additional Information About Your Visit        MyChart Information     [employer] gives you secure access to your electronic health record. If you see a primary care provider, you can also send messages to your care team and make appointments. If you have questions, please call your primary care clinic.  If you do not have a primary care provider, please call [phone] and they will assist you.        Care EveryWhere ID     This is your Care EveryWhere ID. This could be used by other organizations to access your [place] medical records  [id number]        Your Vitals  "Were     Pulse Temperature Respirations Height Last Period Pulse Oximetry    61 97.5  F (36.4  C) (Oral) 16 5' 3\" (1.6 m) 04/13/2001 98%    BMI (Body Mass Index)                   20.02 kg/m2            Blood Pressure from Last 3 Encounters:   02/26/18 118/72   01/15/18 120/76   11/06/17 110/66    Weight from Last 3 Encounters:   02/26/18 113 lb (51.3 kg)   01/15/18 114 lb (51.7 kg)   11/06/17 114 lb (51.7 kg)              We Performed the Following     HPV High Risk Types DNA Cervical     Lipid panel reflex to direct LDL Fasting     Pap imaged thin layer screen with HPV - recommended age 30 - 65 years (select HPV order below)     TSH WITH FREE T4 REFLEX          Today's Medication Changes          These changes are accurate as of 2/26/18 10:26 AM.  If you have any questions, ask your nurse or doctor.               Start taking these medicines.        Dose/Directions    ALPRAZolam 0.25 MG tablet   Commonly known as:  XANAX   Used for:  Anxiety   Started by:  Lindsay Padgett MD        Dose:  0.25-0.5 mg   Take 1-2 tablets (0.25-0.5 mg) by mouth as needed for anxiety   Quantity:  20 tablet   Refills:  0            Where to get your medicines      Some of these will need a paper prescription and others can be bought over the counter.  Ask your nurse if you have questions.     Bring a paper prescription for each of these medications     ALPRAZolam 0.25 MG tablet                Primary Care Provider Office Phone # Fax #    Lindsay Pagdett [phone]       [address] [place] [address]  [facility] [address]        Equal Access to Services     [facility]: Hadii jean-paul iraheta Soamilcar, waaxda luqadaha, qaybta kaalmakenneth echavarria. So [facility] [phone].    ATENCIÓN: Si habla español, tiene a newman disposición servicios gratuitos de asistencia lingüística. Llame al [phone].    We comply with applicable federal civil rights laws and [place] laws. We do not " discriminate on the basis of race, color, national origin, age, disability, sex, sexual orientation, or gender identity.            Thank you!     Thank you for choosing [facility]  for your care. Our goal is always to provide you with excellent care. Hearing back from our patients is one way we can continue to improve our services. Please take a few minutes to complete the written survey that you may receive in the mail after your visit with us. Thank you!             Your Updated Medication List - Protect others around you: Learn how to safely use, store and throw away your medicines at www.disposemymeds.org.          This list is accurate as of 2/26/18 10:26 AM.  Always use your most recent med list.                   Brand Name Dispense Instructions for use Diagnosis    ALPRAZolam 0.25 MG tablet    XANAX    20 tablet    Take 1-2 tablets (0.25-0.5 mg) by mouth as needed for anxiety    Anxiety       ascorbic acid 500 MG tablet    VITAMIN C    0    1 TABLET TWICE DAILY        aspirin 81 MG EC tablet      Take 81 mg by mouth daily.        CALCIUM /VITAMIN D TABS   OR     0    1 TABLET DAILY        docusate sodium 100 MG tablet    COLACE    60 tablet    Take 100 mg by mouth daily.    LLQ abdominal pain, Diverticulosis of colon       fish oil-omega-3 fatty acids 1000 MG capsule      Take 1 g by mouth daily.        IBUPROFEN PO      Take 200 mg by mouth as needed.        latanoprost 0.005 % ophthalmic solution    XALATAN     daily        levothyroxine 88 MCG tablet    SYNTHROID    90 tablet    Take 1 tablet (88 mcg) by mouth every morning    Hypothyroidism, unspecified type       magnesium 250 MG tablet      Take 1 tablet by mouth daily.        MILK OF MAGNESIA 400 MG/5ML suspension   Generic drug:  magnesium hydroxide     360 mL    Take 30-60 mLs by mouth daily as needed for constipation.    LLQ abdominal pain, Diverticulosis of colon       Multiple vitamin Caps      daily        TYLENOL PO            vitamin E 400 UNIT capsule     30 capsule    Two times weekly

## 2018-02-27 ENCOUNTER — MYC MEDICAL ADVICE (OUTPATIENT)
Dept: FAMILY MEDICINE | Facility: OTHER | Age: 58
End: 2018-02-27

## 2018-02-27 ENCOUNTER — THERAPY VISIT (OUTPATIENT)
Dept: OCCUPATIONAL THERAPY | Facility: CLINIC | Age: 58
End: 2018-02-27
Payer: COMMERCIAL

## 2018-02-27 DIAGNOSIS — M65.4 RADIAL STYLOID TENOSYNOVITIS: ICD-10-CM

## 2018-02-27 DIAGNOSIS — Z78.0 POSTMENOPAUSAL STATUS: Primary | ICD-10-CM

## 2018-02-27 DIAGNOSIS — M25.531 RIGHT WRIST PAIN: ICD-10-CM

## 2018-02-27 PROCEDURE — 97035 APP MDLTY 1+ULTRASOUND EA 15: CPT | Mod: GO | Performed by: OCCUPATIONAL THERAPIST

## 2018-02-27 PROCEDURE — 97112 NEUROMUSCULAR REEDUCATION: CPT | Mod: GO | Performed by: OCCUPATIONAL THERAPIST

## 2018-02-27 PROCEDURE — 97140 MANUAL THERAPY 1/> REGIONS: CPT | Mod: GO | Performed by: OCCUPATIONAL THERAPIST

## 2018-02-27 NOTE — MR AVS SNAPSHOT
After Visit Summary   2/27/2018    Umm Claros    MRN: [id number]           Patient Information     Date Of Birth          1960        Visit Information        Provider Department      2/27/2018 11:30 AM Belem Rodriguez OT [facility]        Today's Diagnoses     Radial styloid tenosynovitis        Right wrist pain           Follow-ups after your visit        Your next 10 appointments already scheduled     Mar 06, 2018  2:30 PM CST   CALLI Hand with Belem Rodriguez OT   [facility] ([facility])    [address]  [facility] [address] [phone]              Who to contact     If you have questions or need follow up information about today's clinic visit or your schedule please contact [facility] directly at [phone].  Normal or non-critical lab and imaging results will be communicated to you by MyChart, letter or phone within 4 business days after the clinic has received the results. If you do not hear from us within 7 days, please contact the clinic through [email or web address] or phone. If you have a critical or abnormal lab result, we will notify you by phone as soon as possible.  Submit refill requests through Twelve or call your pharmacy and they will forward the refill request to us. Please allow 3 business days for your refill to be completed.          Additional Information About Your Visit        MyChart Information     Twelve gives you secure access to your electronic health record. If you see a primary care provider, you can also send messages to your care team and make appointments. If you have questions, please call your primary care clinic.  If you do not have a primary care provider, please call [phone] and they will assist you.        Care EveryWhere ID     This is your Care EveryWhere ID. This could be used by other organizations to access your [place] medical records  [id number]        Your Vitals Were      Last Period                   04/13/2001            Blood Pressure from Last 3 Encounters:   02/26/18 118/72   01/15/18 120/76   11/06/17 110/66    Weight from Last 3 Encounters:   02/26/18 51.3 kg (113 lb)   01/15/18 51.7 kg (114 lb)   11/06/17 51.7 kg (114 lb)              We Performed the Following     MANUAL THER TECH,1+REGIONS,EA 15 MIN     NEUROMUSCULAR RE-EDUCATION     ULTRASOUND THERAPY        Primary Care Provider Office Phone # Fax #    Lindsay Padgett [phone]       [address] [facility] [address]  [facility] [address]        Equal Access to Services     ANGIE NOONAN : Hadii jean-paul Schrader, jong stubbs, janey telles, kenneth ma . So [facility] [phone].    ATENCIÓN: Si habla español, tiene a newman disposición servicios gratuitos de asistencia lingüística. [facility] [phone].    We comply with applicable federal civil rights laws and [place] laws. We do not discriminate on the basis of race, color, national origin, age, disability, sex, sexual orientation, or gender identity.            Thank you!     Thank you for choosing [facility]  for your care. Our goal is always to provide you with excellent care. Hearing back from our patients is one way we can continue to improve our services. Please take a few minutes to complete the written survey that you may receive in the mail after your visit with us. Thank you!             Your Updated Medication List - Protect others around you: Learn how to safely use, store and throw away your medicines at www.disposemymeds.org.          This list is accurate as of 2/27/18  1:44 PM.  Always use your most recent med list.                   Brand Name Dispense Instructions for use Diagnosis    ALPRAZolam 0.25 MG tablet    XANAX    20 tablet    Take 1-2 tablets (0.25-0.5 mg) by mouth as needed for anxiety    Anxiety       ascorbic acid 500 MG tablet    VITAMIN C    0    1 TABLET TWICE DAILY         aspirin 81 MG EC tablet      Take 81 mg by mouth daily.        CALCIUM /VITAMIN D TABS   OR     0    1 TABLET DAILY        docusate sodium 100 MG tablet    COLACE    60 tablet    Take 100 mg by mouth daily.    LLQ abdominal pain, Diverticulosis of colon       fish oil-omega-3 fatty acids 1000 MG capsule      Take 1 g by mouth daily.        IBUPROFEN PO      Take 200 mg by mouth as needed.        latanoprost 0.005 % ophthalmic solution    XALATAN     daily        levothyroxine 88 MCG tablet    SYNTHROID    90 tablet    Take 1 tablet (88 mcg) by mouth every morning    Hypothyroidism, unspecified type       magnesium 250 MG tablet      Take 1 tablet by mouth daily.        MILK OF MAGNESIA 400 MG/5ML suspension   Generic drug:  magnesium hydroxide     360 mL    Take 30-60 mLs by mouth daily as needed for constipation.    LLQ abdominal pain, Diverticulosis of colon       Multiple vitamin Caps      daily        TYLENOL PO           vitamin E 400 UNIT capsule     30 capsule    Two times weekly

## 2018-02-27 NOTE — PROGRESS NOTES
SOAP Note - Hand Therapy - Objective Information    Current Date:  2018  Referring Physician: Dr. Maricarmen Claros is a 57 year old right hand dominant female.    Diagnosis:Right Dequervains  DOI: 2017     Patient reports symptoms of pain, stiffness/loss of motion, weakness/loss of strength and edema of the right wrist which occurred due to an unknown etiology but probably related to gardening and gun shooting.     S:  Subjective changes as noted by patient:  I feel like I am going backwards.  When the wrist feels better and I start to do more things then it begins to hurt more.  I think I may get a cortisone injection.  Functional changes noted by patient: I did some shoveling which may have aggravated my symptoms.  I couldn't wear the brace because I can't get gloves over it.      O:  Pain:    VAS(0-10) 18   At Rest:  0/10 0/10 0/10 0/10   With Use:  7-8/10 0-1/10 1-4/10 1-4/10   At Worst  8/10 7-8/10 twinges 4-6/10 twinges 4-6/10     Report of Pain:  Location: right wrist and thumb   Description: Ache, Sharp, Shooting, Radiates into forearm  Frequency: Intermittent  Duration: Same all the time  Exacerbated by: Lifting, gripping, twisting, pulling, activity  Relieved by:  rest,  splints, massage, NSAIDs  Progression:  gradually improving    ROM:  Wrist 18   AROM(PROM) Right Left Right Right   Extension 60 70     Flexion 70 80     RD 21 28     UD 45 45     UD with thumb Flexed 20++ 40 25+ 15+     Thumb 18   AROM(PROM) Right Left   MP /50 /55   IP 60 /70   RAbd / /   PAbd     Retropulsion     Opposition  Kapandji Opposition Scale (0-10/10) 10/10 10/10     Strength:   (Measured in pounds)   18   Trials Right Left   1  2  3  Av  50  50  53 45  47  48  47     Lat Pinch 18   Trials Right Left   1  2  3  Av 11     3 Pt.  Pinch 18   Trials Right Left   1  2  3  Av 10     Functional Exam:  - no pain, +  mild, ++ moderate, +++ severe  MMT:  Right 1/23/18 2/13/18 2/27/18   Thumb EPL  + -    Thumb EPB  - + +   Thumb APL - -    Radial Deviation  ++ ++ ++   Ulnar Deviation  - -    Wrist Ext - -    Wrist Flex  + -      Palpation:  Right 1/23/18 2/13/18    Radial Styloid  + + +   1st dorsal comp.  - -      Special Tests:    Right 1/23/18 2/13/18    Finkelstein   + + +   Radial Nerve Tinels - -      Please refer to the daily flowsheet for treatment provided today.     Home Program:   Warmth for stiffness  Ice after activity for pain  Self TFM to 1st dorsal compartment  Self MFR to EPB/ABL  AROM of wrist and thumb  PROM with stretch into simultaneous thumb flexion and ulnar deviation  Thumb spica orthosis for sleeping and per symptoms day  Avoid activities that exacerbate pain in the wrist or thumb  Proximal radial nerve glides    Next Visit: Possible discharge  US  A/PROM of wrist and thumb  MFR to extensors  TFM to 1st DC

## 2018-02-28 LAB
COPATH REPORT: NORMAL
PAP: NORMAL

## 2018-03-01 ENCOUNTER — TELEPHONE (OUTPATIENT)
Dept: FAMILY MEDICINE | Facility: OTHER | Age: 58
End: 2018-03-01

## 2018-03-01 LAB
FINAL DIAGNOSIS: NORMAL
HPV HR 12 DNA CVX QL NAA+PROBE: NEGATIVE
HPV16 DNA SPEC QL NAA+PROBE: NEGATIVE
HPV18 DNA SPEC QL NAA+PROBE: NEGATIVE
SPECIMEN DESCRIPTION: NORMAL
SPECIMEN SOURCE CVX/VAG CYTO: NORMAL

## 2018-03-01 NOTE — TELEPHONE ENCOUNTER
----- Message from Lindsay Padgett MD sent at 3/1/2018  4:13 PM CST -----  Normal cholesterol and normal thyroid tests.

## 2018-03-01 NOTE — TELEPHONE ENCOUNTER
Bone density scans are usually not required before the age of 65 unless there are significant risk factors like smoking, hormone replacement or being on chronic steroids.  She would like to still get it done I can place the order.

## 2018-03-02 NOTE — TELEPHONE ENCOUNTER
Informed patient that order is in. And she wanted the number to schedule at [place] [phone].

## 2018-03-07 ENCOUNTER — HOSPITAL ENCOUNTER (OUTPATIENT)
Dept: BONE DENSITY | Facility: CLINIC | Age: 58
Discharge: HOME | End: 2018-03-07
Attending: FAMILY MEDICINE | Admitting: FAMILY MEDICINE
Payer: COMMERCIAL

## 2018-03-07 DIAGNOSIS — Z78.0 POSTMENOPAUSAL STATUS: ICD-10-CM

## 2018-03-07 PROCEDURE — 77080 DXA BONE DENSITY AXIAL: CPT

## 2018-03-08 ENCOUNTER — TELEPHONE (OUTPATIENT)
Dept: FAMILY MEDICINE | Facility: OTHER | Age: 58
End: 2018-03-08

## 2018-03-08 NOTE — TELEPHONE ENCOUNTER
LM for patient to return phone call to clinic about message below.  Kelly Sewell CMA ([facility])    Notes Recorded by Lindsay Padgett MD on 3/8/2018 at 5:10 PM  Please inform pt that the dexa scan showed signs of osteopenia ( decreased calcium deposition) in the bone but no signs of osteoporosis.  Her bone density has decreased significantly compared to her previous imaging.  Advise daily calcium and vitamin D and daily exercise.  There are specific medications to help improve bone density.  If she is interested in this please have her schedule an appointment to discuss management

## 2018-03-08 NOTE — TELEPHONE ENCOUNTER
Patient returned the call, result message below was relayed to her.  She stated she will make a follow up appointment if she decides to treat with medication.

## 2018-03-10 ENCOUNTER — MYC MEDICAL ADVICE (OUTPATIENT)
Dept: FAMILY MEDICINE | Facility: OTHER | Age: 58
End: 2018-03-10

## 2018-03-10 DIAGNOSIS — M85.80 OSTEOPENIA, UNSPECIFIED LOCATION: Primary | ICD-10-CM

## 2018-03-12 ENCOUNTER — OFFICE VISIT (OUTPATIENT)
Dept: ORTHOPEDICS | Facility: CLINIC | Age: 58
End: 2018-03-12
Payer: COMMERCIAL

## 2018-03-12 VITALS — HEART RATE: 68 BPM | OXYGEN SATURATION: 97 % | DIASTOLIC BLOOD PRESSURE: 70 MMHG | SYSTOLIC BLOOD PRESSURE: 118 MMHG

## 2018-03-12 DIAGNOSIS — M65.4 RADIAL STYLOID TENOSYNOVITIS: Primary | ICD-10-CM

## 2018-03-12 PROCEDURE — 20605 DRAIN/INJ JOINT/BURSA W/O US: CPT | Mod: RT | Performed by: ORTHOPAEDIC SURGERY

## 2018-03-12 PROCEDURE — 99214 OFFICE O/P EST MOD 30 MIN: CPT | Mod: 25 | Performed by: ORTHOPAEDIC SURGERY

## 2018-03-12 ASSESSMENT — PAIN SCALES - GENERAL: PAINLEVEL: NO PAIN (0)

## 2018-03-12 NOTE — PROGRESS NOTES
Date of Service: Mar 12, 2018    Chief Complaint: Right wrist pain.    History of Present Illness: Umm Claros is a 57 year old, right handed female who presents today for further evaluation of right deQuervain's. I first saw the patient on 1/15/18 at which time we discussed treatment options for her de Quervain's. Specifically, we discussed going to therapy to learn tendon gliding exercises and the possibility of performing injections. At that time, she elected to go to therapy and consider injections in the future. Today, she returns stating she has done therapy and not had significant improvement. She states she still has waxing and waning pain that acutely worsens with activities like picking up children or groceries. She notes the tenderness in her radial wrist has improved since the therapy, but she notices some pain in her dorsal forearm when her wrist pain is flaring.    Physical examination:  VITALS: /70  Pulse 68  LMP 04/13/2001  SpO2 97%  Pain is rated 0 out of 10 on the visual analog scale.  QUICKDASH: 45.45   Strength: The patient's  strength at 3 levels is 55 pounds on the right, versus 55 pounds on the left.   GENERAL: Healthy-appearing adult female in no acute distress.  Alert and oriented times three.  Respiratory: Breathing regular and non-labored.  Right upper extremity: Full shoulder, elbow, forearm, and wrist range of motion. Tenderness at the radial styloid with a palpable prominence. Positive Finkelstein's. 5/5 finger abduction, EPL, and FPL are intact. Patient's neurovascular exam is intact and all digits are warm and well perfused with capillary refill in less than 2 seconds.  Skin: Intact without any rashes or abrasions.    Assessment: 57 year old, right handed female with right de Quervain's tenosynovitis.    Plan: Mrs. Claros and I discussed her ongoing de Quervain's tenosynovitis. I explained that I think the therapy has helped somewhat as she is noticeably less tender  at her radial styloid. I also explained I believe her dorsal forearm pain is secondary to her de Quervain's as the anatomical crossing of the EPB and APL tendons across the forearm would explain this. We discussed further treatment options including corticosteroid injection and surgical intervention. She was clear that she did not want to pursue surgical intervention and would prefer to have the corticosteroid injection today, which I will perform.  Risks include bleeding, infection, elevated blood sugars, ineffectiveness, and skin pigment changes.  I encouraged her to monitor her symptoms going forward and follow up with me as needed. She is agreeable to the plan and all of her questions were answered at this time.    PROCEDURE: After written informed consent was obtained, the patient's radial right wrist was prepped with chloraprep.  A combination of 20 mg of Depo-Medrol and 1cc 1% lidocaine were injected into the radial tendon sheath along the radial wrist in a retrograde fashion. There were no immediate complications and patient tolerated the procedure well.  Band-aid was applied.    I, Odessa Hernadez MD, have reviewed the above note and agree with the scribe's notation as written.   I, Rakan Fay, am serving as a scribe to document services personally performed by Odessa Hernadez MD, based upon my observations and the provider's statements to me. All documentation has been reviewed by the aforementioned doctor prior to being entered into the official medical record.

## 2018-03-12 NOTE — MR AVS SNAPSHOT
After Visit Summary   3/12/2018    Umm Claros    MRN: [id number]           Patient Information     Date Of Birth          1960        Visit Information        Provider Department      3/12/2018 11:45 AM Odessa Hernadez MD [facility]        Today's Diagnoses     Radial styloid tenosynovitis    -  1      Care Instructions    Thanks for coming today.  [facility]  [address] [place] [address]    To schedule future appointments in Ortho Clinic, you may call [phone].    To schedule ordered imaging by your provider:   Call [facility] [phone]    To schedule an injection ordered by your provider:  Call [facility] Injection scheduling line: [phone]  [employer] available online at:  [email or web address]    Please call if any further questions or concerns ([phone]).  Clinic hours 8 am to 5 pm.    Return to clinic (call) if symptoms worsen or fail to improve.            Follow-ups after your visit        Who to contact     If you have questions or need follow up information about today's clinic visit or your schedule please contact [facility] directly at [phone].  Normal or non-critical lab and imaging results will be communicated to you by Satomihart, letter or phone within 4 business days after the clinic has received the results. If you do not hear from us within 7 days, please contact the clinic through Satomihart or phone. If you have a critical or abnormal lab result, we will notify you by phone as soon as possible.  Submit refill requests through [employer] or call your pharmacy and they will forward the refill request to us. Please allow 3 business days for your refill to be completed.          Additional Information About Your Visit        MyChart Information     [employer] gives you secure access to your electronic health record. If you see a primary care provider, you can also send  messages to your care team and make appointments. If you have questions, please call your primary care clinic.  If you do not have a primary care provider, please call [phone] and they will assist you.      [employer] is an electronic gateway that provides easy, online access to your medical records. With [employer], you can request a clinic appointment, read your test results, renew a prescription or communicate with your care team.     To access your existing account, please contact your [facility] or call [phone] for assistance.        Care EveryWhere ID     This is your Care EveryWhere ID. This could be used by other organizations to access your [place] medical records  [id number]        Your Vitals Were     Pulse Last Period Pulse Oximetry             68 04/13/2001 97%          Blood Pressure from Last 3 Encounters:   03/12/18 118/70   02/26/18 118/72   01/15/18 120/76    Weight from Last 3 Encounters:   02/26/18 51.3 kg (113 lb)   01/15/18 51.7 kg (114 lb)   11/06/17 51.7 kg (114 lb)              Today, you had the following     No orders found for display       Primary Care Provider Office Phone # Fax #    Lindsay Padgett [phone]       [address] [facility] [address]  [facility] [address]        Equal Access to Services     CHAYO NOONAN : Hadii jean-paul ku hadasho Soomaali, waaxda luqadaha, qaybta kaalmada adeegyada, kenneth kim haysadie ma . So [facility] [phone].    ATENCIÓN: Si habla español, tiene a newman disposición servicios gratuitos de asistencia lingüística. Llame al [phone].    We comply with applicable federal civil rights laws and [place] laws. We do not discriminate on the basis of race, color, national origin, age, disability, sex, sexual orientation, or gender identity.            Thank you!     Thank you for choosing [facility]  for your care. Our goal is always to provide you with excellent care. Hearing back  from our patients is one way we can continue to improve our services. Please take a few minutes to complete the written survey that you may receive in the mail after your visit with us. Thank you!             Your Updated Medication List - Protect others around you: Learn how to safely use, store and throw away your medicines at www.disposemymeds.org.          This list is accurate as of 3/12/18 11:59 PM.  Always use your most recent med list.                   Brand Name Dispense Instructions for use Diagnosis    ALPRAZolam 0.25 MG tablet    XANAX    20 tablet    Take 1-2 tablets (0.25-0.5 mg) by mouth as needed for anxiety    Anxiety       ascorbic acid 500 MG tablet    VITAMIN C    0    1 TABLET TWICE DAILY        aspirin 81 MG EC tablet      Take 81 mg by mouth daily.        CALCIUM /VITAMIN D TABS   OR     0    1 TABLET DAILY        docusate sodium 100 MG tablet    COLACE    60 tablet    Take 100 mg by mouth daily.    LLQ abdominal pain, Diverticulosis of colon       fish oil-omega-3 fatty acids 1000 MG capsule      Take 1 g by mouth daily.        IBUPROFEN PO      Take 200 mg by mouth as needed.        latanoprost 0.005 % ophthalmic solution    XALATAN     daily        magnesium 250 MG tablet      Take 1 tablet by mouth daily.        MILK OF MAGNESIA 400 MG/5ML suspension   Generic drug:  magnesium hydroxide     360 mL    Take 30-60 mLs by mouth daily as needed for constipation.    LLQ abdominal pain, Diverticulosis of colon       Multiple vitamin Caps      daily        TYLENOL PO           vitamin E 400 UNIT capsule     30 capsule    Two times weekly

## 2018-03-12 NOTE — NURSING NOTE
"Umm Claros's goals for this visit include: Letty Smith's, right  She requests these members of her care team be copied on today's visit information: yes    PCP: Lindsay Padegtt    Referring Provider:  No referring provider defined for this encounter.    Chief Complaint   Patient presents with     LETTY Smith's       Initial /70  Pulse 68  LMP 04/13/2001  SpO2 97% Estimated body mass index is 20.02 kg/(m^2) as calculated from the following:    Height as of 2/26/18: 1.6 m (5' 3\").    Weight as of 2/26/18: 51.3 kg (113 lb).  Medication Reconciliation: complete     Hand Evaluation    Pain Score (1-10): No Pain (0)  Hand Dominance:    QuickDASH Disability Score: 45.45              Force:   R hand  level 3 force: 24.9 kg (55 lb)  L hand  level 3 force: 24.9 kg (55 lb)        "

## 2018-03-12 NOTE — TELEPHONE ENCOUNTER
Agree with Vit D heck. Also add PTH levels to evaluate other causes. Future orders in place. Please let her know

## 2018-03-17 DIAGNOSIS — E03.9 HYPOTHYROIDISM, UNSPECIFIED TYPE: ICD-10-CM

## 2018-03-20 DIAGNOSIS — M85.80 OSTEOPENIA, UNSPECIFIED LOCATION: ICD-10-CM

## 2018-03-20 LAB
DEPRECATED CALCIDIOL+CALCIFEROL SERPL-MC: 57 UG/L (ref 20–75)
PTH-INTACT SERPL-MCNC: 22 PG/ML (ref 18–80)

## 2018-03-20 PROCEDURE — 83970 ASSAY OF PARATHORMONE: CPT | Performed by: FAMILY MEDICINE

## 2018-03-20 PROCEDURE — 36415 COLL VENOUS BLD VENIPUNCTURE: CPT | Performed by: FAMILY MEDICINE

## 2018-03-20 PROCEDURE — 82306 VITAMIN D 25 HYDROXY: CPT | Performed by: FAMILY MEDICINE

## 2018-03-20 RX ORDER — LEVOTHYROXINE SODIUM 88 UG/1
TABLET ORAL
Qty: 90 TABLET | Refills: 3 | Status: SHIPPED | OUTPATIENT
Start: 2018-03-20 | End: 2019-03-13

## 2018-03-20 NOTE — TELEPHONE ENCOUNTER
Synthroid:  Prescription approved per [facility] Refill Protocol.  Laura Simeon, RN, BSN

## 2018-06-04 ENCOUNTER — OFFICE VISIT (OUTPATIENT)
Dept: URGENT CARE | Facility: RETAIL CLINIC | Age: 58
End: 2018-06-04
Payer: COMMERCIAL

## 2018-06-04 VITALS
HEART RATE: 65 BPM | TEMPERATURE: 97.7 F | DIASTOLIC BLOOD PRESSURE: 73 MMHG | OXYGEN SATURATION: 100 % | SYSTOLIC BLOOD PRESSURE: 119 MMHG

## 2018-06-04 DIAGNOSIS — N30.01 ACUTE CYSTITIS WITH HEMATURIA: Primary | ICD-10-CM

## 2018-06-04 DIAGNOSIS — R30.0 DYSURIA: ICD-10-CM

## 2018-06-04 LAB
APPEARANCE UR: ABNORMAL
BILIRUB UR QL: ABNORMAL
COLOR UR: YELLOW
GLUCOSE URINE: ABNORMAL MG/DL
HGB UR QL: ABNORMAL
KETONES UR QL: ABNORMAL MG/DL
LEUKOCYTE ESTERASE URINE: ABNORMAL
NITRITE UR QL STRIP: ABNORMAL
PH UR STRIP: 7 PH (ref 5–7)
PROTEIN ALBUMIN URINE: 30 MG/DL
SOURCE: ABNORMAL
SP GR UR STRIP: 1.02 (ref 1–1.03)
UROBILINOGEN UR QL STRIP: 0.2 EU/DL (ref 0.2–1)

## 2018-06-04 PROCEDURE — 81002 URINALYSIS NONAUTO W/O SCOPE: CPT | Mod: QW | Performed by: PHYSICIAN ASSISTANT

## 2018-06-04 PROCEDURE — 87088 URINE BACTERIA CULTURE: CPT | Performed by: PHYSICIAN ASSISTANT

## 2018-06-04 PROCEDURE — 87186 SC STD MICRODIL/AGAR DIL: CPT | Performed by: PHYSICIAN ASSISTANT

## 2018-06-04 PROCEDURE — 99202 OFFICE O/P NEW SF 15 MIN: CPT | Performed by: PHYSICIAN ASSISTANT

## 2018-06-04 PROCEDURE — 87086 URINE CULTURE/COLONY COUNT: CPT | Performed by: PHYSICIAN ASSISTANT

## 2018-06-04 RX ORDER — PHENAZOPYRIDINE HYDROCHLORIDE 200 MG/1
200 TABLET, FILM COATED ORAL 3 TIMES DAILY PRN
Qty: 6 TABLET | Refills: 0 | Status: SHIPPED | OUTPATIENT
Start: 2018-06-04 | End: 2018-12-11

## 2018-06-04 RX ORDER — SULFAMETHOXAZOLE/TRIMETHOPRIM 800-160 MG
1 TABLET ORAL 2 TIMES DAILY
Qty: 6 TABLET | Refills: 0 | Status: SHIPPED | OUTPATIENT
Start: 2018-06-04 | End: 2018-06-07

## 2018-06-04 NOTE — PATIENT INSTRUCTIONS
"   * BLADDER INFECTION,Female (Adult)    A bladder infection (\"cystitis\" or \"UTI\") usually causes a constant urge to urinate and a burning when passing urine. Urine may be cloudy, smelly or dark. There may be pain in the lower abdomen. A bladder infection occurs when bacteria from the vaginal area enter the bladder opening (urethra). This can occur from sexual intercourse, wearing tight clothing, dehydration and other factors.  HOME CARE:  1. Drink lots of fluids (at least 6-8 glasses a day, unless you must restrict fluids for other medical reasons). This will force the medicine into your urinary system and flush the bacteria out of your body. Cranberry juice has been shown to help clear out the bacteria.  2. Avoid sexual intercourse until your symptoms are gone.  3. A bladder infection is treated with antibiotics. You may also be given Pyridium (generic = phenazopyridine) to reduce the burning sensation. This medicine will cause your urine to become a bright orange color. The orange urine may stain clothing. You may wear a pad or panty-liner to protect clothing.  PREVENTING FUTURE INFECTIONS:  1. Always wipe from front to back after a bowel movement.  2. Keep the genital area clean and dry.  3. Drink plenty of fluids each day to avoid dehydration.  4. Urinate right after intercourse to flush out the bladder.  5. Wear cotton underwear and cotton-lined panty hose; avoid tight-fitting pants.  6. If you are on birth control pills and are having frequent bladder infections, discuss with your doctor.  FOLLOW UP: Return to this facility or see your doctor if ALL symptoms are not gone after three days of treatment.  GET PROMPT MEDICAL ATTENTION if any of the following occur:    Fever over 101 F (38.3 C)    No improvement by the third day of treatment    Increasing back or abdominal pain    Repeated vomiting; unable to keep medicine down    Weakness, dizziness or fainting    Vaginal discharge    Pain, redness or swelling in " the labia (outer vaginal area)    0705-9718 [employer]. [address] [place] [address], [place] [address]. All rights reserved. This information is not intended as a substitute for professional medical care. Always follow your healthcare professional's instructions.  This information has been modified by your health care provider with permission from the publisher.    ...................    We will contact you if the urine culture indicates a change in treatment plan.    Please FOLLOW UP at primary care clinic if not improving, new symptoms, worse or this does not resolve.  [facility]  [phone]  [facility]  [phone]

## 2018-06-04 NOTE — PROGRESS NOTES
Umm Claros is a 57 year old female who  Presents to the [facility] today for a possible UTI. Symptoms of dysuria, frequency, blood in urine and polyuria this am.There is no history of backache, fever, chills, nausea or vomiting.  No recent history of vaginal discharge or infection. No history of pyelonephritis. No concerns for STD's. This patient does not have a history of urinary tract infections.       ROS:  ENT - denies ear pain, throat pain. No nasal congestion.  CP - no cough,SOB or chest pain.   GI/ - Appetite - normal. No nausea, vomiting or diarrhea.   No bowel  changes   MSK - no joint pain or swelling.   Skin-  No rashes. NO lesions.    Past Medical History:   Diagnosis Date     Anxiety state, unspecified      Depressive disorder, not elsewhere classified     depression     Raynaud's syndrome      Tobacco use disorder      Unspecified hypothyroidism      Past Surgical History:   Procedure Laterality Date     C APPENDECTOMY  05/26/00    Laparoscopic drainage of left ovarian cyst and lysis of adhesions, dilatation and curettage, and laparoscopic appendectomy performed by Dr. Ryan     COLONOSCOPY  4/10/2012    Procedure:COLONOSCOPY; Colonoscopy; Surgeon:BELL LAYTON; Location: GI     HC REMOVAL OF OVARY/TUBE(S)  01/30/01    Laparoscopic left sided salpingo-oophorectomy. Cystoscopy     HC REMOVE TONSILS/ADENOIDS,<13 Y/O      T & A <12y.o.     Patient Active Problem List   Diagnosis     Hypothyroidism     Intradermal nevus     Osteopenia of prematurity     Chronic constipation     Diverticulitis of colon     Advance Care Planning     De Quervain's disease (tenosynovitis)     Radial styloid tenosynovitis     Right wrist pain     Vaginal atrophy     Current Outpatient Prescriptions   Medication     Acetaminophen (TYLENOL PO)     ALPRAZolam (XANAX) 0.25 MG tablet     aspirin 81 MG EC tablet     CALCIUM /VITAMIN D TABS   OR     docusate sodium 100 MG tablet     fish oil-omega-3 fatty  "acids (FISH OIL) 1000 MG capsule     IBUPROFEN PO     latanoprost (XALATAN) 0.005 % ophthalmic solution     levothyroxine (SYNTHROID/LEVOTHROID) 88 MCG tablet     Magnesium 250 MG tablet     magnesium hydroxide (MILK OF MAGNESIA) 400 MG/5ML suspension     MULTIPLE VITAMIN CAPS   OR     VITAMIN C TABS 500 MG OR     vitamin E 400 UNIT capsule     No current facility-administered medications for this visit.          O: /73  Pulse 65  Temp 97.7  F (36.5  C) (Oral)  LMP 04/13/2001  SpO2 100%     The patient appears comfortable and in no apparent distress.  Afebrile.  Back: no CVA or flank tenderness.  Abdomen: soft, positive for bowel sounds, no  supra pubic tenderness.  Neck: supple, no adenopathy, and thyroid normal size, non-tender, without nodularity.  Lungs -clear    See lab results.    A:    Dysuria  Acute cystitis with hematuria    P: Prescriptions as below. Discussed indications, dosing, side affects and adverse reactions of medications with  Patient -Bactrim and Pyridium  Take a probiotic or eat yogurt while on antibiotics.  Drink plenty of fluids.    Prevention and treatment of UTI's discussed.  Signs and symptoms of pyelonephritis mentioned.    Urine culture pending. Pt will be contacted if change in treatment plan is indicated.  If symptoms do not improve in a few days or if at anytime she is worse she will follow up with her primary care provider.    AVS given and discussed:    Patient Instructions      * BLADDER INFECTION,Female (Adult)    A bladder infection (\"cystitis\" or \"UTI\") usually causes a constant urge to urinate and a burning when passing urine. Urine may be cloudy, smelly or dark. There may be pain in the lower abdomen. A bladder infection occurs when bacteria from the vaginal area enter the bladder opening (urethra). This can occur from sexual intercourse, wearing tight clothing, dehydration and other factors.  HOME CARE:  1. Drink lots of fluids (at least 6-8 glasses a day, unless you " must restrict fluids for other medical reasons). This will force the medicine into your urinary system and flush the bacteria out of your body. Cranberry juice has been shown to help clear out the bacteria.  2. Avoid sexual intercourse until your symptoms are gone.  3. A bladder infection is treated with antibiotics. You may also be given Pyridium (generic = phenazopyridine) to reduce the burning sensation. This medicine will cause your urine to become a bright orange color. The orange urine may stain clothing. You may wear a pad or panty-liner to protect clothing.  PREVENTING FUTURE INFECTIONS:  1. Always wipe from front to back after a bowel movement.  2. Keep the genital area clean and dry.  3. Drink plenty of fluids each day to avoid dehydration.  4. Urinate right after intercourse to flush out the bladder.  5. Wear cotton underwear and cotton-lined panty hose; avoid tight-fitting pants.  6. If you are on birth control pills and are having frequent bladder infections, discuss with your doctor.  FOLLOW UP: Return to this facility or see your doctor if ALL symptoms are not gone after three days of treatment.  GET PROMPT MEDICAL ATTENTION if any of the following occur:    Fever over 101 F (38.3 C)    No improvement by the third day of treatment    Increasing back or abdominal pain    Repeated vomiting; unable to keep medicine down    Weakness, dizziness or fainting    Vaginal discharge    Pain, redness or swelling in the labia (outer vaginal area)    1004-0816 [employer]. [address] [place] [address], [place] [id number]. All rights reserved. This information is not intended as a substitute for professional medical care. Always follow your healthcare professional's instructions.  This information has been modified by your health care provider with permission from the publisher.    ...................    We will contact you if the urine culture indicates a change in treatment plan.    Please FOLLOW UP at  primary care clinic if not improving, new symptoms, worse or this does not resolve.  [facility]  [phone]  [facility]  [phone]        Pt is comfortable with this plan.  Electronically signed,  VIVIAN Brunner, PAC

## 2018-06-04 NOTE — MR AVS SNAPSHOT
"              After Visit Summary   6/4/2018    Umm Claros    MRN: [id number]           Patient Information     Date Of Birth          1960        Visit Information        Provider Department      6/4/2018 1:30 PM Zuleyka Brunner PA-C [facility]        Today's Diagnoses     Dysuria    -  1    Acute cystitis with hematuria          Care Instructions       * BLADDER INFECTION,Female (Adult)    A bladder infection (\"cystitis\" or \"UTI\") usually causes a constant urge to urinate and a burning when passing urine. Urine may be cloudy, smelly or dark. There may be pain in the lower abdomen. A bladder infection occurs when bacteria from the vaginal area enter the bladder opening (urethra). This can occur from sexual intercourse, wearing tight clothing, dehydration and other factors.  HOME CARE:  1. Drink lots of fluids (at least 6-8 glasses a day, unless you must restrict fluids for other medical reasons). This will force the medicine into your urinary system and flush the bacteria out of your body. Cranberry juice has been shown to help clear out the bacteria.  2. Avoid sexual intercourse until your symptoms are gone.  3. A bladder infection is treated with antibiotics. You may also be given Pyridium (generic = phenazopyridine) to reduce the burning sensation. This medicine will cause your urine to become a bright orange color. The orange urine may stain clothing. You may wear a pad or panty-liner to protect clothing.  PREVENTING FUTURE INFECTIONS:  1. Always wipe from front to back after a bowel movement.  2. Keep the genital area clean and dry.  3. Drink plenty of fluids each day to avoid dehydration.  4. Urinate right after intercourse to flush out the bladder.  5. Wear cotton underwear and cotton-lined panty hose; avoid tight-fitting pants.  6. If you are on birth control pills and are having frequent bladder infections, discuss with your doctor.  FOLLOW UP: Return to this facility or " see your doctor if ALL symptoms are not gone after three days of treatment.  GET PROMPT MEDICAL ATTENTION if any of the following occur:    Fever over 101 F (38.3 C)    No improvement by the third day of treatment    Increasing back or abdominal pain    Repeated vomiting; unable to keep medicine down    Weakness, dizziness or fainting    Vaginal discharge    Pain, redness or swelling in the labia (outer vaginal area)    3493-8372 [employer]. [address] [place] [address], [place] [address]. All rights reserved. This information is not intended as a substitute for professional medical care. Always follow your healthcare professional's instructions.  This information has been modified by your health care provider with permission from the publisher.    ...................    We will contact you if the urine culture indicates a change in treatment plan.    Please FOLLOW UP at primary care clinic if not improving, new symptoms, worse or this does not resolve.  [facility]  [phone]  [facility]  [phone]              Follow-ups after your visit        Who to contact     You can reach your care team any time of the day by calling [phone].  Notification of test results:  If you have an abnormal lab result, we will notify you by phone as soon as possible.         Additional Information About Your Visit        [employer] Information     [employer] gives you secure access to your electronic health record. If you see a primary care provider, you can also send messages to your care team and make appointments. If you have questions, please call your primary care clinic.  If you do not have a primary care provider, please call [phone] and they will assist you.        Care EveryWhere ID     This is your Care EveryWhere ID. This could be used by other organizations to access your [place] medical records  [id number]        Your Vitals Were     Pulse Temperature Last Period Pulse  Oximetry          65 97.7  F (36.5  C) (Oral) 04/13/2001 100%         Blood Pressure from Last 3 Encounters:   06/04/18 119/73   03/12/18 118/70   02/26/18 118/72    Weight from Last 3 Encounters:   02/26/18 113 lb (51.3 kg)   01/15/18 114 lb (51.7 kg)   11/06/17 114 lb (51.7 kg)              We Performed the Following     HCL U/A, W/O MICRO, NON AUTO     Urine Culture Aerobic Bacterial          Today's Medication Changes          These changes are accurate as of 6/4/18  1:55 PM.  If you have any questions, ask your nurse or doctor.               Start taking these medicines.        Dose/Directions    phenazopyridine 200 MG tablet   Commonly known as:  PYRIDIUM   Used for:  Dysuria        Dose:  200 mg   Take 1 tablet (200 mg) by mouth 3 times daily as needed   Quantity:  6 tablet   Refills:  0       sulfamethoxazole-trimethoprim 800-160 MG per tablet   Commonly known as:  BACTRIM DS/SEPTRA DS   Used for:  Acute cystitis with hematuria        Dose:  1 tablet   Take 1 tablet by mouth 2 times daily for 3 days   Quantity:  6 tablet   Refills:  0            Where to get your medicines      These medications were sent [address]  [address] [facility] [address]     Phone:  [phone]     phenazopyridine 200 MG tablet    sulfamethoxazole-trimethoprim 800-160 MG per tablet                Primary Care Provider Office Phone # Fax #    Lindsay Padgett [phone]       [address] [place] [address]        Equal Access to Services     [facility]: Hadii aad ku hadasho Soamilcar, waaxda luqadaha, qaybta kaalmada adecooper, waxay idiin hayaan adeeg kharash la'aan . So [facility] [phone].    ATENCIÓN: Si habla español, tiene a newman disposición servicios gratuitos de asistencia lingüística. Llame al [phone].    We comply with applicable federal civil rights laws and [place] laws. We do not discriminate on the basis of race, color, national origin, age,  disability, sex, sexual orientation, or gender identity.            Thank you!     Thank you for choosing [facility]  for your care. Our goal is always to provide you with excellent care. Hearing back from our patients is one way we can continue to improve our services. Please take a few minutes to complete the written survey that you may receive in the mail after your visit with us. Thank you!             Your Updated Medication List - Protect others around you: Learn how to safely use, store and throw away your medicines at www.disposemymeds.org.          This list is accurate as of 6/4/18  1:55 PM.  Always use your most recent med list.                   Brand Name Dispense Instructions for use Diagnosis    ALPRAZolam 0.25 MG tablet    XANAX    20 tablet    Take 1-2 tablets (0.25-0.5 mg) by mouth as needed for anxiety    Anxiety       ascorbic acid 500 MG tablet    VITAMIN C    0    1 TABLET TWICE DAILY        aspirin 81 MG EC tablet      Take 81 mg by mouth daily.        CALCIUM /VITAMIN D TABS   OR     0    1 TABLET DAILY        docusate sodium 100 MG tablet    COLACE    60 tablet    Take 100 mg by mouth daily.    LLQ abdominal pain, Diverticulosis of colon       fish oil-omega-3 fatty acids 1000 MG capsule      Take 1 g by mouth daily.        IBUPROFEN PO      Take 200 mg by mouth as needed.        latanoprost 0.005 % ophthalmic solution    XALATAN     daily        levothyroxine 88 MCG tablet    SYNTHROID/LEVOTHROID    90 tablet    TAKE ONE TABLET BY MOUTH EVERY MORNING    Hypothyroidism, unspecified type       magnesium 250 MG tablet      Take 1 tablet by mouth daily.        MILK OF MAGNESIA 400 MG/5ML suspension   Generic drug:  magnesium hydroxide     360 mL    Take 30-60 mLs by mouth daily as needed for constipation.    LLQ abdominal pain, Diverticulosis of colon       Multiple vitamin Caps      daily        phenazopyridine 200 MG tablet    PYRIDIUM    6 tablet    Take 1 tablet (200  mg) by mouth 3 times daily as needed    Dysuria       sulfamethoxazole-trimethoprim 800-160 MG per tablet    BACTRIM DS/SEPTRA DS    6 tablet    Take 1 tablet by mouth 2 times daily for 3 days    Acute cystitis with hematuria       TYLENOL PO           vitamin E 400 UNIT capsule     30 capsule    Two times weekly

## 2018-06-07 LAB
BACTERIA SPEC CULT: ABNORMAL
Lab: ABNORMAL
SPECIMEN SOURCE: ABNORMAL

## 2018-06-19 PROBLEM — M25.531 RIGHT WRIST PAIN: Status: RESOLVED | Noted: 2018-01-23 | Resolved: 2018-06-19

## 2018-06-19 PROBLEM — M65.4 RADIAL STYLOID TENOSYNOVITIS: Status: RESOLVED | Noted: 2018-01-23 | Resolved: 2018-06-19

## 2018-07-11 ENCOUNTER — MYC MEDICAL ADVICE (OUTPATIENT)
Dept: FAMILY MEDICINE | Facility: OTHER | Age: 58
End: 2018-07-11

## 2018-07-11 DIAGNOSIS — K57.32 DIVERTICULITIS OF COLON: ICD-10-CM

## 2018-07-12 ENCOUNTER — MYC MEDICAL ADVICE (OUTPATIENT)
Dept: FAMILY MEDICINE | Facility: OTHER | Age: 58
End: 2018-07-12

## 2018-07-12 RX ORDER — AMOXICILLIN AND CLAVULANATE POTASSIUM 500; 125 MG/1; MG/1
1 TABLET, FILM COATED ORAL 3 TIMES DAILY
Qty: 30 TABLET | Refills: 0 | Status: SHIPPED | OUTPATIENT
Start: 2018-07-12 | End: 2018-11-10

## 2018-07-12 NOTE — TELEPHONE ENCOUNTER
"Augmentin -     Last Written Prescription Date:  08/03/2017  Last Fill Quantity: 30,   # refills: 0  Last Office Visit: 02/28/2018  Future Office visit:       Routing refill request to provider for review/approval because:  - Drug not active on patient's medication list   - It was discontinued on 11/16/2017 by RK.   - The rx does say on notes for pharmacy \"standing order for acute diverticulitis. Fill at pt's request\"    "

## 2018-07-13 NOTE — TELEPHONE ENCOUNTER
Hello, I recently sent a message with an incorrect phone # to contact me if neccessary. The correct cell# is [phone]. I look forward to your response.

## 2018-10-12 ENCOUNTER — ALLIED HEALTH/NURSE VISIT (OUTPATIENT)
Dept: FAMILY MEDICINE | Facility: CLINIC | Age: 58
End: 2018-10-12
Payer: COMMERCIAL

## 2018-10-12 DIAGNOSIS — Z23 NEED FOR PROPHYLACTIC VACCINATION AND INOCULATION AGAINST INFLUENZA: Primary | ICD-10-CM

## 2018-10-12 PROCEDURE — 90686 IIV4 VACC NO PRSV 0.5 ML IM: CPT

## 2018-10-12 PROCEDURE — 90471 IMMUNIZATION ADMIN: CPT

## 2018-10-12 NOTE — PROGRESS NOTES
Injectable Influenza Immunization Documentation    1.  Is the person to be vaccinated sick today?   No    2. Does the person to be vaccinated have an allergy to a component   of the vaccine?   No  Egg Allergy Algorithm Link    3. Has the person to be vaccinated ever had a serious reaction   to influenza vaccine in the past?   No    4. Has the person to be vaccinated ever had Guillain-Barré syndrome?   No    Form completed by Yamilet Anne CMA  Prior to injection verified patient identity using patient's name and date of birth.  Due to injection administration, patient instructed to remain in clinic for 15 minutes  afterwards, and to report any adverse reaction to me immediately.

## 2018-10-12 NOTE — MR AVS SNAPSHOT
After Visit Summary   10/12/2018    Umm Claros    MRN: [id number]           Patient Information     Date Of Birth          1960        Visit Information        Provider Department      10/12/2018 11:30 AM IMELDA REBOLLAR MA [facility]        Today's Diagnoses     Need for prophylactic vaccination and inoculation against influenza    -  1       Follow-ups after your visit        Who to contact     If you have questions or need follow up information about today's clinic visit or your schedule please contact [facility] directly at [phone].  Normal or non-critical lab and imaging results will be communicated to you by [employer] Log[employer]art, letter or phone within 4 business days after the clinic has received the results. If you do not hear from us within 7 days, please contact the clinic through [employer] or phone. If you have a critical or abnormal lab result, we will notify you by phone as soon as possible.  Submit refill requests through [employer] or call your pharmacy and they will forward the refill request to us. Please allow 3 business days for your refill to be completed.          Additional Information About Your Visit        MyChart Information     [employer] gives you secure access to your electronic health record. If you see a primary care provider, you can also send messages to your care team and make appointments. If you have questions, please call your primary care clinic.  If you do not have a primary care provider, please call [phone] and they will assist you.        Care EveryWhere ID     This is your Care EveryWhere ID. This could be used by other organizations to access your [place] medical records  [id number]        Your Vitals Were     Last Period                   04/13/2001            Blood Pressure from Last 3 Encounters:   06/04/18 119/73   03/12/18 118/70   02/26/18 118/72    Weight from Last 3 Encounters:   02/26/18 113 lb (51.3 kg)   01/15/18 114 lb  (51.7 kg)   11/06/17 114 lb (51.7 kg)              We Performed the Following     FLU VACCINE, SPLIT VIRUS, IM (QUADRIVALENT) [05868]- >3 YRS     Vaccine Administration, Initial [09362]        Primary Care Provider Office Phone # Fax #    Lindsay Padgett [phone]       [address] [facility] [address]  [facility] [address]        Equal Access to Services     ANGIE NOONAN : Hadii aad ku hadasho Soomaali, waaxda luqadaha, qaybta kaalmada adeegyada, kenneth kim haydustinn desmond ma . So [facility] [phone].    ATENCIÓN: Si alexandre pruett, tiene a newman disposición servicios gratuitos de asistencia lingüística. [facility] [phone].    We comply with applicable federal civil rights laws and [place] laws. We do not discriminate on the basis of race, color, national origin, age, disability, sex, sexual orientation, or gender identity.            Thank you!     Thank you for choosing [facility]  for your care. Our goal is always to provide you with excellent care. Hearing back from our patients is one way we can continue to improve our services. Please take a few minutes to complete the written survey that you may receive in the mail after your visit with us. Thank you!             Your Updated Medication List - Protect others around you: Learn how to safely use, store and throw away your medicines at www.disposemymeds.org.          This list is accurate as of 10/12/18 11:50 AM.  Always use your most recent med list.                   Brand Name Dispense Instructions for use Diagnosis    ALPRAZolam 0.25 MG tablet    XANAX    20 tablet    Take 1-2 tablets (0.25-0.5 mg) by mouth as needed for anxiety    Anxiety       amoxicillin-clavulanate 500-125 MG per tablet    AUGMENTIN    30 tablet    Take 1 tablet by mouth 3 times daily    Diverticulitis of colon       ascorbic acid 500 MG tablet    VITAMIN C    0    1 TABLET TWICE DAILY        aspirin 81 MG EC tablet      Take 81 mg by mouth daily.         CALCIUM /VITAMIN D TABS   OR     0    1 TABLET DAILY        docusate sodium 100 MG tablet    COLACE    60 tablet    Take 100 mg by mouth daily.    LLQ abdominal pain, Diverticulosis of colon       fish oil-omega-3 fatty acids 1000 MG capsule      Take 1 g by mouth daily.        IBUPROFEN PO      Take 200 mg by mouth as needed.        latanoprost 0.005 % ophthalmic solution    XALATAN     daily        levothyroxine 88 MCG tablet    SYNTHROID/LEVOTHROID    90 tablet    TAKE ONE TABLET BY MOUTH EVERY MORNING    Hypothyroidism, unspecified type       magnesium 250 MG tablet      Take 1 tablet by mouth daily.        MILK OF MAGNESIA 400 MG/5ML suspension   Generic drug:  magnesium hydroxide     360 mL    Take 30-60 mLs by mouth daily as needed for constipation.    LLQ abdominal pain, Diverticulosis of colon       Multiple vitamin Caps      daily        phenazopyridine 200 MG tablet    PYRIDIUM    6 tablet    Take 1 tablet (200 mg) by mouth 3 times daily as needed    Dysuria       TYLENOL PO           vitamin E 400 UNIT capsule     30 capsule    Two times weekly

## 2018-10-21 ENCOUNTER — APPOINTMENT (OUTPATIENT)
Dept: CT IMAGING | Facility: CLINIC | Age: 58
End: 2018-10-21
Attending: FAMILY MEDICINE
Payer: COMMERCIAL

## 2018-10-21 ENCOUNTER — HOSPITAL ENCOUNTER (EMERGENCY)
Facility: CLINIC | Age: 58
Discharge: HOME | End: 2018-10-21
Attending: FAMILY MEDICINE | Admitting: FAMILY MEDICINE
Payer: COMMERCIAL

## 2018-10-21 VITALS
OXYGEN SATURATION: 98 % | TEMPERATURE: 97.4 F | WEIGHT: 117.5 LBS | DIASTOLIC BLOOD PRESSURE: 74 MMHG | SYSTOLIC BLOOD PRESSURE: 112 MMHG | BODY MASS INDEX: 20.81 KG/M2 | RESPIRATION RATE: 16 BRPM

## 2018-10-21 DIAGNOSIS — K57.92 ACUTE DIVERTICULITIS: ICD-10-CM

## 2018-10-21 LAB
ALBUMIN SERPL-MCNC: 3.8 G/DL (ref 3.4–5)
ALBUMIN UR-MCNC: NEGATIVE MG/DL
ALP SERPL-CCNC: 63 U/L (ref 40–150)
ALT SERPL W P-5'-P-CCNC: 15 U/L (ref 0–50)
ANION GAP SERPL CALCULATED.3IONS-SCNC: 8 MMOL/L (ref 3–14)
APPEARANCE UR: CLEAR
AST SERPL W P-5'-P-CCNC: 18 U/L (ref 0–45)
BASOPHILS # BLD AUTO: 0 10E9/L (ref 0–0.2)
BASOPHILS NFR BLD AUTO: 0.4 %
BILIRUB SERPL-MCNC: 0.7 MG/DL (ref 0.2–1.3)
BILIRUB UR QL STRIP: NEGATIVE
BUN SERPL-MCNC: 13 MG/DL (ref 7–30)
CALCIUM SERPL-MCNC: 9.2 MG/DL (ref 8.5–10.1)
CHLORIDE SERPL-SCNC: 104 MMOL/L (ref 94–109)
CO2 SERPL-SCNC: 27 MMOL/L (ref 20–32)
COLOR UR AUTO: YELLOW
CREAT SERPL-MCNC: 0.84 MG/DL (ref 0.52–1.04)
DIFFERENTIAL METHOD BLD: ABNORMAL
EOSINOPHIL NFR BLD AUTO: 0.4 %
ERYTHROCYTE [DISTWIDTH] IN BLOOD BY AUTOMATED COUNT: 11.4 % (ref 10–15)
GFR SERPL CREATININE-BSD FRML MDRD: 70 ML/MIN/1.7M2
GLUCOSE SERPL-MCNC: 104 MG/DL (ref 70–99)
GLUCOSE UR STRIP-MCNC: NEGATIVE MG/DL
HCT VFR BLD AUTO: 42.8 % (ref 35–47)
HGB BLD-MCNC: 14.7 G/DL (ref 11.7–15.7)
HGB UR QL STRIP: NEGATIVE
IMM GRANULOCYTES # BLD: 0.1 10E9/L (ref 0–0.4)
IMM GRANULOCYTES NFR BLD: 0.6 %
KETONES UR STRIP-MCNC: 20 MG/DL
LEUKOCYTE ESTERASE UR QL STRIP: ABNORMAL
LIPASE SERPL-CCNC: 128 U/L (ref 73–393)
LYMPHOCYTES # BLD AUTO: 0.9 10E9/L (ref 0.8–5.3)
LYMPHOCYTES NFR BLD AUTO: 7.9 %
MCH RBC QN AUTO: 32.8 PG (ref 26.5–33)
MCHC RBC AUTO-ENTMCNC: 34.3 G/DL (ref 31.5–36.5)
MCV RBC AUTO: 96 FL (ref 78–100)
MONOCYTES # BLD AUTO: 1.1 10E9/L (ref 0–1.3)
MONOCYTES NFR BLD AUTO: 9.5 %
MUCOUS THREADS #/AREA URNS LPF: PRESENT /LPF
NEUTROPHILS # BLD AUTO: 9 10E9/L (ref 1.6–8.3)
NEUTROPHILS NFR BLD AUTO: 81.2 %
NITRATE UR QL: NEGATIVE
NRBC # BLD AUTO: 0 10*3/UL
NRBC BLD AUTO-RTO: 0 /100
PH UR STRIP: 5 PH (ref 5–7)
PLATELET # BLD AUTO: 218 10E9/L (ref 150–450)
POTASSIUM SERPL-SCNC: 4.2 MMOL/L (ref 3.4–5.3)
PROT SERPL-MCNC: 7.5 G/DL (ref 6.8–8.8)
RBC # BLD AUTO: 4.48 10E12/L (ref 3.8–5.2)
RBC #/AREA URNS AUTO: 1 /HPF (ref 0–2)
SODIUM SERPL-SCNC: 139 MMOL/L (ref 133–144)
SOURCE: ABNORMAL
SP GR UR STRIP: 1.01 (ref 1–1.03)
SQUAMOUS #/AREA URNS AUTO: 1 /HPF (ref 0–1)
UROBILINOGEN UR STRIP-MCNC: 0 MG/DL (ref 0–2)
WBC # BLD AUTO: 11.1 10E9/L (ref 4–11)
WBC #/AREA URNS AUTO: 5 /HPF (ref 0–5)

## 2018-10-21 PROCEDURE — 99285 EMERGENCY DEPT VISIT HI MDM: CPT | Mod: Z6 | Performed by: FAMILY MEDICINE

## 2018-10-21 PROCEDURE — 96361 HYDRATE IV INFUSION ADD-ON: CPT | Performed by: FAMILY MEDICINE

## 2018-10-21 PROCEDURE — 25000128 H RX IP 250 OP 636: Performed by: FAMILY MEDICINE

## 2018-10-21 PROCEDURE — 85025 COMPLETE CBC W/AUTO DIFF WBC: CPT | Performed by: FAMILY MEDICINE

## 2018-10-21 PROCEDURE — 96375 TX/PRO/DX INJ NEW DRUG ADDON: CPT | Performed by: FAMILY MEDICINE

## 2018-10-21 PROCEDURE — 81001 URINALYSIS AUTO W/SCOPE: CPT | Performed by: FAMILY MEDICINE

## 2018-10-21 PROCEDURE — 96374 THER/PROPH/DIAG INJ IV PUSH: CPT | Mod: 59 | Performed by: FAMILY MEDICINE

## 2018-10-21 PROCEDURE — 80053 COMPREHEN METABOLIC PANEL: CPT | Performed by: FAMILY MEDICINE

## 2018-10-21 PROCEDURE — 25000125 ZZHC RX 250: Performed by: FAMILY MEDICINE

## 2018-10-21 PROCEDURE — 99285 EMERGENCY DEPT VISIT HI MDM: CPT | Mod: 25 | Performed by: FAMILY MEDICINE

## 2018-10-21 PROCEDURE — 83690 ASSAY OF LIPASE: CPT | Performed by: FAMILY MEDICINE

## 2018-10-21 PROCEDURE — 74177 CT ABD & PELVIS W/CONTRAST: CPT

## 2018-10-21 RX ORDER — ONDANSETRON 2 MG/ML
4 INJECTION INTRAMUSCULAR; INTRAVENOUS EVERY 30 MIN PRN
Status: DISCONTINUED | OUTPATIENT
Start: 2018-10-21 | End: 2018-10-21 | Stop reason: HOSPADM

## 2018-10-21 RX ORDER — SODIUM CHLORIDE 9 MG/ML
1000 INJECTION, SOLUTION INTRAVENOUS CONTINUOUS
Status: DISCONTINUED | OUTPATIENT
Start: 2018-10-21 | End: 2018-10-21 | Stop reason: HOSPADM

## 2018-10-21 RX ORDER — IOPAMIDOL 755 MG/ML
500 INJECTION, SOLUTION INTRAVASCULAR ONCE
Status: COMPLETED | OUTPATIENT
Start: 2018-10-21 | End: 2018-10-21

## 2018-10-21 RX ORDER — KETOROLAC TROMETHAMINE 30 MG/ML
30 INJECTION, SOLUTION INTRAMUSCULAR; INTRAVENOUS ONCE
Status: COMPLETED | OUTPATIENT
Start: 2018-10-21 | End: 2018-10-21

## 2018-10-21 RX ORDER — HYDROCODONE BITARTRATE AND ACETAMINOPHEN 5; 325 MG/1; MG/1
1 TABLET ORAL EVERY 6 HOURS PRN
Qty: 10 TABLET | Refills: 0 | Status: SHIPPED | OUTPATIENT
Start: 2018-10-21 | End: 2018-12-11

## 2018-10-21 RX ORDER — HYDROMORPHONE HYDROCHLORIDE 1 MG/ML
0.5 INJECTION, SOLUTION INTRAMUSCULAR; INTRAVENOUS; SUBCUTANEOUS
Status: DISCONTINUED | OUTPATIENT
Start: 2018-10-21 | End: 2018-10-21 | Stop reason: HOSPADM

## 2018-10-21 RX ADMIN — ONDANSETRON HYDROCHLORIDE 4 MG: 2 INJECTION, SOLUTION INTRAMUSCULAR; INTRAVENOUS at 09:28

## 2018-10-21 RX ADMIN — ONDANSETRON HYDROCHLORIDE 4 MG: 2 INJECTION, SOLUTION INTRAMUSCULAR; INTRAVENOUS at 08:19

## 2018-10-21 RX ADMIN — IOPAMIDOL 57 ML: 755 INJECTION, SOLUTION INTRAVENOUS at 09:07

## 2018-10-21 RX ADMIN — SODIUM CHLORIDE 60 ML: 9 INJECTION, SOLUTION INTRAVENOUS at 09:07

## 2018-10-21 RX ADMIN — Medication 0.5 MG: at 09:31

## 2018-10-21 RX ADMIN — SODIUM CHLORIDE 1000 ML: 9 INJECTION, SOLUTION INTRAVENOUS at 08:14

## 2018-10-21 RX ADMIN — KETOROLAC TROMETHAMINE 30 MG: 30 INJECTION, SOLUTION INTRAMUSCULAR at 08:15

## 2018-10-21 NOTE — ED TRIAGE NOTES
Lower abd pain for one week.  History of diverticulitis.  Took an antibiotic augmentin this morning.

## 2018-10-21 NOTE — DISCHARGE INSTRUCTIONS

## 2018-10-21 NOTE — ED AVS SNAPSHOT
[facility]    [address] [place] [address]    Phone:  [phone]    Fax:  [phone]                                       Umm Claros   MRN: [id number]    Department:  [facility]   Date of Visit:  10/21/2018           After Visit Summary Signature Page     I have received my discharge instructions, and my questions have been answered. I have discussed any challenges I see with this plan with the nurse or doctor.    ..........................................................................................................................................  Patient/Patient Representative Signature      ..........................................................................................................................................  Patient Representative Print Name and Relationship to Patient    ..................................................               ................................................  Date                                   Time    ..........................................................................................................................................  Reviewed by Signature/Title    ...................................................              ..............................................  Date                                               Time          22EPIC Rev 08/18

## 2018-10-21 NOTE — ED AVS SNAPSHOT
[facility] Emergency Department    [address] [place] [address]    Phone:  [phone]    Fax:  [phone]                                       Umm Claros   MRN: [id number]    Department:  [facility] Emergency Department   Date of Visit:  10/21/2018           Patient Information     Date Of Birth          1960        Your diagnoses for this visit were:     Acute diverticulitis        You were seen by Soham Ramirez MD.      Follow-up Information     Follow up with Lindsay Padgett MD. Schedule an appointment as soon as possible for a visit in 1 week.    Specialty:  Family Practice    Why:  For follow up on your ED stay    Contact information:    [address]  [facility] [address]  [phone]          Discharge Instructions         Diverticulitis    Some people get pouches along the wall of the colon as they get older. The pouches, called diverticuli, usually cause no symptoms. If the pouches become blocked, you can get an infection. This infection is called diverticulitis. It causes pain in your lower abdomen and fever. If not treated, it can become a serious condition, causing an abscess to form inside the pouch. The abscess may block the intestinal tract even or rupture, spreading infection throughout the abdomen.  When treatment is started early, oral antibiotics alone may be enough to cure diverticulitis. This method is tried first. But, if you don't improve or if your condition gets worse while using oral antibiotics, you may need to be admitted to the hospital for IV antibiotics. Severe cases may require surgery.  Home care  The following guidelines will help you care for yourself at home:    During the acute illness, rest and follow your healthcare provider's instructions about diet. Sometimes you will need to follow a clear liquid diet to rest your bowel. Once your symptoms are better, you may be told to follow a low-fiber diet for some time.  Include foods like:  ? Flake cereal, mashed potatoes, pancakes, waffles, pasta, white bread, rice, applesauce, bananas, eggs, fish, poultry, tofu, and cooked soft vegetables    Take antibiotics exactly as instructed. Don't miss any doses or stop taking the medication, even if you feel better.    Monitor your temperature and tell your healthcare provider if you have rising temperatures.  Preventing future attacks  Once you have an episode of diverticulitis, you are at risk for having it again. After you have recovered from this episode, you may be able to lower your risk by eating a high-fiber diet (20 gm/day to 35 gm/day of fiber). This cleans out the colon pouches that already exist and may prevent new ones from forming. Foods high in fiber include fresh fruits and edible peelings, raw or lightly cooked vegetables, whole grain cereals and breads, dried beans and peas, and bran.  Other steps that can help prevent future attacks include:    Take your medicines, such as antibiotics, as your healthcare provider says.    Drink 6 to 8 glasses of water every day, unless told otherwise.    Use a heating pad or hot water bottle to help abdominal cramping or pain.    Begin an exercise program. Ask your healthcare provider how to get started. You can benefit from simple activities such as walking or gardening.    Treat diarrhea with a bland diet. Start with liquids only; then slowly add fiber over time.    Watch for changes in your bowel movements (constipation to diarrhea). Avoid constipation by eating a high fiber diet and taking a stool softener if needed.    Get plenty of rest and sleep.  Follow-up care  Follow up with your healthcare provider as advised or sooner if you are not getting better in the next 2 days.  When to seek medical advice  Call your healthcare provider right away if any of these occur:    Fever of 100.4 F (38 C) or higher, or as directed by your healthcare provider    Repeated vomiting or swelling of  the abdomen    Weakness, dizziness, light-headedness    Pain in your abdomen that gets worse, severe, or spreads to your back    Pain that moves to the right lower abdomen    Rectal bleeding (stools that are red, black or maroon color)    Unexpected vaginal bleeding  Date Last Reviewed: 9/1/2016 2000-2017 [employer]. [address] [place] [address]. All rights reserved. This information is not intended as a substitute for professional medical care. Always follow your healthcare professional's instructions.        Diet for Diverticular Disease  What is diverticular disease?   When the inner wall of your colon weakens and forms small pouches, you have diverticulosis. For most people, this causes no symptoms.   If the pouches become inflamed or infected, you have diverticulitis. Warning signs may include pain in the lower abdomen, chills and vomiting (throwing up).  These conditions are called diverticular disease.  What causes it?  The cause has been linked to many years of eating too little fiber. People who eat plenty of whole grains, fresh fruits and vegetables are less likely to get this disease.   Once the pouches have formed, there is no way to reverse them.   How much fiber should I get?  If you're healing from diverticulitis or surgery to remove the inflamed area, you will at first follow a low-fiber diet (less than 10 grams each day). Then, as your doctor advises, you may slowly add fiber. Increase your intake by 1 to 2 grams a day. Your goal will be 25 to 30 grams a day.   To avoid future problems, continue to get 25 to 30 grams of fiber each day.   How can fiber help?   A high-fiber diet will help you have regular bowel movements. Fiber adds bulk to the stool and helps it pass more easily. Fiber also helps prevent the pouches from growing larger or getting infected.  In the past, doctors have warned patients to avoid eating nuts, seeds and popcorn. They believed these foods could  get caught in the pouches and inflame them. But recent studies do not support this idea.   Please ask your dietitian for the handout Fiber Content in Common Foods. It will show you how to get more fiber in your diet.  For informational purposes only. Not to replace the advice of your health care provider.   Copyright   2007 [facility]. All rights reserved. [employer] 605706 - REV 09/15.      24 Hour Appointment Hotline       To make an appointment at any [facility] clinic, call [phone] ([phone]). If you don't have a family doctor or clinic, we will help you find one. [facility] clinics are conveniently located to serve the needs of you and your family.             Review of your medicines      START taking        Dose / Directions Last dose taken    HYDROcodone-acetaminophen 5-325 MG per tablet   Commonly known as:  NORCO   Dose:  1 tablet   Quantity:  10 tablet        Take 1 tablet by mouth every 6 hours as needed for severe pain   Refills:  0          Our records show that you are taking the medicines listed below. If these are incorrect, please call your family doctor or clinic.        Dose / Directions Last dose taken    ALPRAZolam 0.25 MG tablet   Commonly known as:  XANAX   Dose:  0.25-0.5 mg   Quantity:  20 tablet        Take 1-2 tablets (0.25-0.5 mg) by mouth as needed for anxiety   Refills:  0        amoxicillin-clavulanate 500-125 MG per tablet   Commonly known as:  AUGMENTIN   Dose:  1 tablet   Quantity:  30 tablet        Take 1 tablet by mouth 3 times daily   Refills:  0        ascorbic acid 500 MG tablet   Commonly known as:  VITAMIN C   Quantity:  0        1 TABLET TWICE DAILY   Refills:  0        aspirin 81 MG EC tablet   Dose:  81 mg        Take 81 mg by mouth daily.   Refills:  0        CALCIUM /VITAMIN D TABS   OR   Quantity:  0        1 TABLET DAILY   Refills:  0        docusate sodium 100 MG tablet   Commonly known as:  COLACE   Dose:  100 mg   Quantity:  60 tablet         Take 100 mg by mouth daily.   Refills:  1        fish oil-omega-3 fatty acids 1000 MG capsule   Dose:  1 g        Take 1 g by mouth daily.   Refills:  0        IBUPROFEN PO   Dose:  200 mg        Take 200 mg by mouth as needed.   Refills:  0        latanoprost 0.005 % ophthalmic solution   Commonly known as:  XALATAN        daily   Refills:  1        levothyroxine 88 MCG tablet   Commonly known as:  SYNTHROID/LEVOTHROID   Quantity:  90 tablet        TAKE ONE TABLET BY MOUTH EVERY MORNING   Refills:  3        magnesium 250 MG tablet   Dose:  1 tablet        Take 1 tablet by mouth daily.   Refills:  0        MILK OF MAGNESIA 400 MG/5ML suspension   Dose:  30-60 mL   Quantity:  360 mL   Generic drug:  magnesium hydroxide        Take 30-60 mLs by mouth daily as needed for constipation.   Refills:  1        Multiple vitamin Caps        daily   Refills:  0        phenazopyridine 200 MG tablet   Commonly known as:  PYRIDIUM   Dose:  200 mg   Quantity:  6 tablet        Take 1 tablet (200 mg) by mouth 3 times daily as needed   Refills:  0        TYLENOL PO        Refills:  0        vitamin E 400 UNIT capsule   Quantity:  30 capsule        Two times weekly   Refills:  0                Information about OPIOIDS     PRESCRIPTION OPIOIDS: WHAT YOU NEED TO KNOW   We gave you an opioid (narcotic) pain medicine. It is important to manage your pain, but opioids are not always the best choice. You should first try all the other options your care team gave you. Take this medicine for as short a time (and as few doses) as possible.    Some activities can increase your pain, such as bandage changes or therapy sessions. It may help to take your pain medicine 30 to 60 minutes before these activities. Reduce your stress by getting enough sleep, working on hobbies you enjoy and practicing relaxation or meditation. Talk to your care team about ways to manage your pain beyond prescription opioids.    These medicines have risks:    DO NOT  drive when on new or higher doses of pain medicine. These medicines can affect your alertness and reaction times, and you could be arrested for driving under the influence (DUI). If you need to use opioids long-term, talk to your care team about driving.    DO NOT operate heavy machinery    DO NOT do any other dangerous activities while taking these medicines.    DO NOT drink any alcohol while taking these medicines.     If the opioid prescribed includes acetaminophen, DO NOT take with any other medicines that contain acetaminophen. Read all labels carefully. Look for the word  acetaminophen  or  Tylenol.  Ask your pharmacist if you have questions or are unsure.    You can get addicted to pain medicines, especially if you have a history of addiction (chemical, alcohol or substance dependence). Talk to your care team about ways to reduce this risk.    All opioids tend to cause constipation. Drink plenty of water and eat foods that have a lot of fiber, such as fruits, vegetables, prune juice, apple juice and high-fiber cereal. Take a laxative (Miralax, milk of magnesia, Colace, Senna) if you don t move your bowels at least every other day. Other side effects include upset stomach, sleepiness, dizziness, throwing up, tolerance (needing more of the medicine to have the same effect), physical dependence and slowed breathing.    Store your pills in a secure place, locked if possible. We will not replace any lost or stolen medicine. If you don t finish your medicine, please throw away (dispose) as directed by your pharmacist. The [place] [employer] has more information about safe disposal: [email or web address]        Prescriptions were sent or printed at these locations (1 Prescription)                   [facility] [place], [place] - [address] [facility] [address]    [address], [facility] [address]    Telephone:  [phone]   Fax:  [phone]    Hours:                  Printed at Department/Unit printer (1 of 1)         HYDROcodone-acetaminophen (NORCO) 5-325 MG per tablet                Procedures and tests performed during your visit     CBC with platelets differential    CT Abdomen Pelvis w Contrast    Comprehensive metabolic panel    Give 20 ounces of water 15 minutes before CT of abdomen    Lipase    Peripheral IV catheter    UA reflex to Microscopic and Culture      Orders Needing Specimen Collection     None      Pending Results     No orders found from 10/19/2018 to 10/22/2018.            Pending Culture Results     No orders found from 10/19/2018 to 10/22/2018.            Pending Results Instructions     If you had any lab results that were not finalized at the time of your Discharge, you can call the ED Lab Result RN at [phone]. You will be contacted by this team for any positive Lab results or changes in treatment. The nurses are available 7 days a week from 10A to 6:30P.  You can leave a message 24 hours per day and they will return your call.        Thank you for choosing [facility]       Thank you for choosing [facility] for your care. Our goal is always to provide you with excellent care. Hearing back from our patients is one way we can continue to improve our services. Please take a few minutes to complete the written survey that you may receive in the mail after you visit with us. Thank you!        "[employer]hart Information     [employer] gives you secure access to your electronic health record. If you see a primary care provider, you can also send messages to your care team and make appointments. If you have questions, please call your primary care clinic.  If you do not have a primary care provider, please call [phone] and they will assist you.        Care EveryWhere ID     This is your Care EveryWhere ID. This could be used by other organizations to access your [facility] medical records  [id number]        Equal Access to Services      CHAYO NOONAN : Hadii jean-paul Schrader, waaxda luqadaha, qaybta kaalmada elfego, kenneth roldan. So [facility] [phone].    ATENCIÓN: Si habla español, tiene a newman disposición servicios gratuitos de asistencia lingüística. Llame al [phone].    We comply with applicable federal civil rights laws and [place] laws. We do not discriminate on the basis of race, color, national origin, age, disability, sex, sexual orientation, or gender identity.            After Visit Summary       This is your record. Keep this with you and show to your community pharmacist(s) and doctor(s) at your next visit.

## 2018-10-21 NOTE — ED PROVIDER NOTES
History     Chief Complaint   Patient presents with     Abdominal Pain     HPI  Umm Claros is a 57 year old female who presents with left lower quadrant abdominal pains been going on for the past week.  Initially the pain was getting a little bit better but then got a lot worse in the last 24 hours.  Patient had a history of multiple bouts of diverticulitis in the past and she is absolutely sure this is what is going on again.  Patient denies any fevers or chills.  Patient has had some intermittent nausea.  Patient denies any vomiting though.  Bowel movements have been normal, patient has had some urinary urgency but no frequency or blood in the urine.  Patient has not had any imaging or colonoscopy after any of her bouts of diverticulitis recently.  Patient states that she is having a little bit of lower back pain also.  Patient states pressing the area moving around makes the pain worse, nothing really helps.    Problem List:    Patient Active Problem List    Diagnosis Date Noted     Vaginal atrophy 02/26/2018     Priority: Medium     De Quervain's disease (tenosynovitis) 11/06/2017     Priority: Medium     Advance Care Planning 07/31/2017     Priority: Medium     Diverticulitis of colon 02/23/2017     Priority: Medium     Chronic constipation 03/14/2016     Priority: Medium     Osteopenia of prematurity 11/09/2012     Priority: Medium     Intradermal nevus 08/08/2011     Priority: Medium     right side of neck       Hypothyroidism 10/03/2005     Priority: Medium        Past Medical History:    Past Medical History:   Diagnosis Date     Anxiety state, unspecified      Depressive disorder, not elsewhere classified      Raynaud's syndrome      Tobacco use disorder      Unspecified hypothyroidism        Past Surgical History:    Past Surgical History:   Procedure Laterality Date     C APPENDECTOMY  05/26/00    Laparoscopic drainage of left ovarian cyst and lysis of adhesions, dilatation and curettage, and  laparoscopic appendectomy performed by Dr. Ryan     COLONOSCOPY  4/10/2012    Procedure:COLONOSCOPY; Colonoscopy; Surgeon:BELL LAYTON; Location:[facility]     HC REMOVAL OF OVARY/TUBE(S)  01/30/01    Laparoscopic left sided salpingo-oophorectomy. Cystoscopy     HC REMOVE TONSILS/ADENOIDS,<11 Y/O      T & A <12y.o.       Family History:    Family History   Problem Relation Age of Onset     Cancer Paternal Grandmother      breast cancer     Gynecology Mother      endometriosis     Cancer Father      prostate cancer     Alcohol/Drug Father      alcohol problems and smoker       Social History:  Marital Status:   [2]  Social History   Substance Use Topics     Smoking status: Former Smoker     Years: 7.00     Quit date: 4/1/1993     Smokeless tobacco: Never Used     Alcohol use Yes      Comment: 3-5 drinks per week        Medications:      amoxicillin-clavulanate (AUGMENTIN) 500-125 MG per tablet   IBUPROFEN PO   levothyroxine (SYNTHROID/LEVOTHROID) 88 MCG tablet   Acetaminophen (TYLENOL PO)   ALPRAZolam (XANAX) 0.25 MG tablet   aspirin 81 MG EC tablet   CALCIUM /VITAMIN D TABS   OR   docusate sodium 100 MG tablet   fish oil-omega-3 fatty acids (FISH OIL) 1000 MG capsule   latanoprost (XALATAN) 0.005 % ophthalmic solution   Magnesium 250 MG tablet   magnesium hydroxide (MILK OF MAGNESIA) 400 MG/5ML suspension   MULTIPLE VITAMIN CAPS   OR   phenazopyridine (PYRIDIUM) 200 MG tablet   VITAMIN C TABS 500 MG OR   vitamin E 400 UNIT capsule         Review of Systems   All other systems reviewed and are negative.      Physical Exam   BP: 112/74  Heart Rate: 93  Temp: 97.4  F (36.3  C)  Resp: 16  Weight: 53.3 kg (117 lb 8 oz)  SpO2: 100 %      Physical Exam   Constitutional: She is oriented to person, place, and time. She appears well-developed and well-nourished. No distress.   HENT:   Head: Normocephalic and atraumatic.   Mouth/Throat: Oropharynx is clear and moist. No oropharyngeal exudate.   Neck: Normal range of  motion.   Cardiovascular: Normal rate and regular rhythm.    No murmur heard.  Pulmonary/Chest: Effort normal and breath sounds normal. No respiratory distress. She has no wheezes. She has no rales.   Abdominal: Soft. Bowel sounds are normal. She exhibits no distension. There is tenderness (suprapubic). There is no rebound and no guarding.   Neurological: She is alert and oriented to person, place, and time.   Skin: Skin is warm and dry. She is not diaphoretic.   Nursing note and vitals reviewed.      ED Course     ED Course     Procedures    Results for orders placed or performed during the hospital encounter of 10/21/18   CT Abdomen Pelvis w Contrast    Narrative    CT ABDOMEN AND PELVIS WITH CONTRAST 10/21/2018 9:19 AM     HISTORY: Left lower quadrant abdominal pain, history of  diverticulitis.     COMPARISON: CT abdomen and pelvis 8/7/2017.    TECHNIQUE: Axial images from the lung bases to the symphysis are  performed with additional coronal reformatted images. 57 mL of Isovue  370 are given intravenously.  Radiation dose for this scan was reduced  using automated exposure control, adjustment of the mA and/or kV  according to patient size, or iterative reconstruction technique.     FINDINGS:  The lung bases are clear.    Abdomen: Probable right hepatic lobe hemangioma measuring 1 cm on  series 2, image 18 is likely stable. A few scattered tiny  low-attenuation liver lesions are probably cysts and also appear  stable. The liver, spleen, gallbladder, pancreas, adrenal glands and  kidneys are unremarkable. No hydronephrosis or renal calculi. No  enlarged lymph nodes. The bowel is normal in caliber without  obstruction however there is marked colonic wall thickening in the mid  sigmoid region with surrounding inflammation consistent with acute  diverticulitis. No evidence of free intraperitoneal air or abscess at  this time. Appendix not visualized.    Pelvis: The bladder, uterus, adnexal regions and rectum  are  unremarkable. Small amount of free pelvic fluid may be related to the  diverticular inflammation. No enlarged pelvic or inguinal lymph nodes.  Bone window examination is unremarkable.      Impression    IMPRESSION:  1. Acute diverticulitis without evidence of abscess or free  intraperitoneal air.  2. Probable liver hemangioma and liver cysts. No interval change.    GILLIAN BARTON MD   CBC with platelets differential   Result Value Ref Range    WBC 11.1 (H) 4.0 - 11.0 10e9/L    RBC Count 4.48 3.8 - 5.2 10e12/L    Hemoglobin 14.7 11.7 - 15.7 g/dL    Hematocrit 42.8 35.0 - 47.0 %    MCV 96 78 - 100 fl    MCH 32.8 26.5 - 33.0 pg    MCHC 34.3 31.5 - 36.5 g/dL    RDW 11.4 10.0 - 15.0 %    Platelet Count 218 150 - 450 10e9/L    Diff Method Automated Method     % Neutrophils 81.2 %    % Lymphocytes 7.9 %    % Monocytes 9.5 %    % Eosinophils 0.4 %    % Basophils 0.4 %    % Immature Granulocytes 0.6 %    Nucleated RBCs 0 0 /100    Absolute Neutrophil 9.0 (H) 1.6 - 8.3 10e9/L    Absolute Lymphocytes 0.9 0.8 - 5.3 10e9/L    Absolute Monocytes 1.1 0.0 - 1.3 10e9/L    Absolute Basophils 0.0 0.0 - 0.2 10e9/L    Abs Immature Granulocytes 0.1 0 - 0.4 10e9/L    Absolute Nucleated RBC 0.0    Comprehensive metabolic panel   Result Value Ref Range    Sodium 139 133 - 144 mmol/L    Potassium 4.2 3.4 - 5.3 mmol/L    Chloride 104 94 - 109 mmol/L    Carbon Dioxide 27 20 - 32 mmol/L    Anion Gap 8 3 - 14 mmol/L    Glucose 104 (H) 70 - 99 mg/dL    Urea Nitrogen 13 7 - 30 mg/dL    Creatinine 0.84 0.52 - 1.04 mg/dL    GFR Estimate 70 >60 mL/min/1.7m2    GFR Estimate If Black 84 >60 mL/min/1.7m2    Calcium 9.2 8.5 - 10.1 mg/dL    Bilirubin Total 0.7 0.2 - 1.3 mg/dL    Albumin 3.8 3.4 - 5.0 g/dL    Protein Total 7.5 6.8 - 8.8 g/dL    Alkaline Phosphatase 63 40 - 150 U/L    ALT 15 0 - 50 U/L    AST 18 0 - 45 U/L   Lipase   Result Value Ref Range    Lipase 128 73 - 393 U/L   UA reflex to Microscopic and Culture   Result Value Ref Range    Color  Urine Yellow     Appearance Urine Clear     Glucose Urine Negative NEG^Negative mg/dL    Bilirubin Urine Negative NEG^Negative    Ketones Urine 20 (A) NEG^Negative mg/dL    Specific Gravity Urine 1.013 1.003 - 1.035    Blood Urine Negative NEG^Negative    pH Urine 5.0 5.0 - 7.0 pH    Protein Albumin Urine Negative NEG^Negative mg/dL    Urobilinogen mg/dL 0.0 0.0 - 2.0 mg/dL    Nitrite Urine Negative NEG^Negative    Leukocyte Esterase Urine Trace (A) NEG^Negative    Source Midstream Urine     RBC Urine 1 0 - 2 /HPF    WBC Urine 5 0 - 5 /HPF    Squamous Epithelial /HPF Urine 1 0 - 1 /HPF    Mucous Urine Present (A) NEG^Negative /LPF     Medications   0.9% sodium chloride BOLUS (1,000 mLs Intravenous New Bag 10/21/18 0814)     Followed by   sodium chloride 0.9% infusion (not administered)   ondansetron (ZOFRAN) injection 4 mg (4 mg Intravenous Given 10/21/18 0928)   HYDROmorphone (PF) (DILAUDID) injection 0.5 mg (0.5 mg Intravenous Given 10/21/18 0931)   ketorolac (TORADOL) injection 30 mg (30 mg Intravenous Given 10/21/18 0815)   sodium chloride (PF) 0.9% PF flush 3 mL (3 mLs Intracatheter Given 10/21/18 0907)   iopamidol (ISOVUE-370) solution 500 mL (57 mLs Intravenous Given 10/21/18 0907)   new 100 ml saline bag (60 mLs Intravenous Given 10/21/18 0907)     Labs are reviewed and were unremarkable except for slightly elevated white count.  CT scan of the abdomen did show signs of acute diverticulitis.  There is no signs of any complications.  Patient's pain is well controlled with the above medications.  We will discharge the patient home.  She already has a 10-day course of Augmentin that she started taking last night, I will have her continue this.  Patient was sent home with a few pain pills.  Patient will follow up with her doctor if things are not improving over the next few days.  I did recommend that the patient also needs to get an outpatient colonoscopy in about 3-4 weeks after she gets over the symptoms.  We  also discussed if she has had so many recurrent episodes she may need to consider talking to surgery about this.    Assessments & Plan (with Medical Decision Making)  Acute diverticulitis     I have reviewed the nursing notes.    I have reviewed the findings, diagnosis, plan and need for follow up with the patient.              10/21/2018   [facility] EMERGENCY DEPARTMENT     Soahm Ramirez MD  10/21/18 0990

## 2018-10-25 NOTE — PROGRESS NOTES
SUBJECTIVE:   Umm Claros is a 57 year old female who presents to clinic today for the following health issues:      HPI     ED/UC Followup:    Facility:  [facility] Emergency Room  Date of visit: 10/21/18  Reason for visit: Abdominal Pain/Diverticulitis   Current Status: Tenderness- Today is last day of antibiotic       Problem list and histories reviewed & adjusted, as indicated.  Additional history: as documented        Patient Active Problem List   Diagnosis     Hypothyroidism     Intradermal nevus     Osteopenia of prematurity     Chronic constipation     Diverticulitis of colon     Advance Care Planning     De Quervain's disease (tenosynovitis)     Vaginal atrophy     Past Surgical History:   Procedure Laterality Date     C APPENDECTOMY  05/26/00    Laparoscopic drainage of left ovarian cyst and lysis of adhesions, dilatation and curettage, and laparoscopic appendectomy performed by Dr. Ryan     COLONOSCOPY  4/10/2012    Procedure:COLONOSCOPY; Colonoscopy; Surgeon:BELL LAYTON; Location:[facility]     HC REMOVAL OF OVARY/TUBE(S)  01/30/01    Laparoscopic left sided salpingo-oophorectomy. Cystoscopy     HC REMOVE TONSILS/ADENOIDS,<13 Y/O      T & A <12y.o.       Social History   Substance Use Topics     Smoking status: Former Smoker     Years: 7.00     Quit date: 4/1/1993     Smokeless tobacco: Never Used     Alcohol use Yes      Comment: 3-5 drinks per week     Family History   Problem Relation Age of Onset     Cancer Paternal Grandmother      breast cancer     Gynecology Mother      endometriosis     Cancer Father      prostate cancer     Alcohol/Drug Father      alcohol problems and smoker         Current Outpatient Prescriptions   Medication Sig Dispense Refill     Acetaminophen (TYLENOL PO)        ALPRAZolam (XANAX) 0.25 MG tablet Take 1-2 tablets (0.25-0.5 mg) by mouth as needed for anxiety 20 tablet 0     amoxicillin-clavulanate (AUGMENTIN) 500-125 MG per tablet Take 1 tablet by mouth 3  times daily 30 tablet 0     aspirin 81 MG EC tablet Take 81 mg by mouth daily.       CALCIUM /VITAMIN D TABS   OR 1 TABLET DAILY 0 0     docusate sodium 100 MG tablet Take 100 mg by mouth daily. 60 tablet 1     fish oil-omega-3 fatty acids (FISH OIL) 1000 MG capsule Take 1 g by mouth daily.       HYDROcodone-acetaminophen (NORCO) 5-325 MG per tablet Take 1 tablet by mouth every 6 hours as needed for severe pain 10 tablet 0     IBUPROFEN PO Take 200 mg by mouth as needed.       latanoprost (XALATAN) 0.005 % ophthalmic solution daily  1     levothyroxine (SYNTHROID/LEVOTHROID) 88 MCG tablet TAKE ONE TABLET BY MOUTH EVERY MORNING 90 tablet 3     Magnesium 250 MG tablet Take 1 tablet by mouth daily.       magnesium hydroxide (MILK OF MAGNESIA) 400 MG/5ML suspension Take 30-60 mLs by mouth daily as needed for constipation. 360 mL 1     MULTIPLE VITAMIN CAPS   OR daily  0     phenazopyridine (PYRIDIUM) 200 MG tablet Take 1 tablet (200 mg) by mouth 3 times daily as needed 6 tablet 0     VITAMIN C TABS 500 MG OR 1 TABLET TWICE DAILY 0 0     vitamin E 400 UNIT capsule Two times weekly 30 capsule 0     Allergies   Allergen Reactions     No Known Allergies      Tramadol      Other reaction(s): Other - Describe In Comment Field  Dizzy and double vision     BP Readings from Last 3 Encounters:   10/30/18 104/72   10/21/18 112/74   06/04/18 119/73    Wt Readings from Last 3 Encounters:   10/30/18 114 lb (51.7 kg)   10/21/18 117 lb 8 oz (53.3 kg)   02/26/18 113 lb (51.3 kg)                  Labs reviewed in EPIC    ROS:  Constitutional, HEENT, cardiovascular, pulmonary, gi and gu systems are negative, except as otherwise noted.    OBJECTIVE:     /72 (BP Location: Right arm, Patient Position: Chair, Cuff Size: Adult Regular)  Pulse 70  Temp 98.6  F (37  C) (Temporal)  Resp 16  Wt 114 lb (51.7 kg)  LMP 04/13/2001  SpO2 100%  BMI 20.19 kg/m2  Body mass index is 20.19 kg/(m^2).   Physical Exam   Constitutional: She is  oriented to person, place, and time. She appears well-developed and well-nourished.   HENT:   Head: Normocephalic and atraumatic.   Cardiovascular: Normal rate, regular rhythm and normal heart sounds.    Pulmonary/Chest: Effort normal and breath sounds normal.   Neurological: She is alert and oriented to person, place, and time.   Psychiatric: She has a normal mood and affect.         Diagnostic Test Results:  none     ASSESSMENT/PLAN:   Patient is a very pleasant 57-year-old with history of recurrent diverticulitis presents to the clinic for a close follow-up after her most recent episode when she had to go to the ED and has recently just finished a round of antibiotics.  I tried to reassure her but she would like testing done to look for a dietary causes for such frequent diverticulitis. I discussed the yield of such test is usually low and would not help much in preventing recurrent diverticulitis. She unerstands this but would still like to try getting the testing done. Referral placed per pt request.    Problem List Items Addressed This Visit     Diverticulitis of colon - Primary    Relevant Orders    ALLERGY/ASTHMA ADULT REFERRAL      Other Visit Diagnoses     Food intolerance in adult        Relevant Orders    ALLERGY/ASTHMA ADULT REFERRAL          Lindsay Padgett MD  [facility]

## 2018-10-30 ENCOUNTER — OFFICE VISIT (OUTPATIENT)
Dept: FAMILY MEDICINE | Facility: OTHER | Age: 58
End: 2018-10-30
Payer: COMMERCIAL

## 2018-10-30 VITALS
BODY MASS INDEX: 20.19 KG/M2 | RESPIRATION RATE: 16 BRPM | OXYGEN SATURATION: 100 % | WEIGHT: 114 LBS | SYSTOLIC BLOOD PRESSURE: 104 MMHG | DIASTOLIC BLOOD PRESSURE: 72 MMHG | HEART RATE: 70 BPM | TEMPERATURE: 98.6 F

## 2018-10-30 DIAGNOSIS — K57.32 DIVERTICULITIS OF COLON: Primary | ICD-10-CM

## 2018-10-30 DIAGNOSIS — K90.49 FOOD INTOLERANCE IN ADULT: ICD-10-CM

## 2018-10-30 PROCEDURE — 99214 OFFICE O/P EST MOD 30 MIN: CPT | Performed by: FAMILY MEDICINE

## 2018-10-30 ASSESSMENT — PAIN SCALES - GENERAL: PAINLEVEL: NO PAIN (0)

## 2018-10-30 NOTE — MR AVS SNAPSHOT
After Visit Summary   10/30/2018    Umm Claros    MRN: [id number]           Patient Information     Date Of Birth          1960        Visit Information        Provider Department      10/30/2018 10:40 AM Lindsay Padgett MD [facility]        Today's Diagnoses     Diverticulitis of colon    -  1    Food intolerance in adult           Follow-ups after your visit        Additional Services     ALLERGY/ASTHMA ADULT REFERRAL       Your provider has referred you to: [facility]: [facility] [phone] [email or web address]    Please be aware that coverage of these services is subject to the terms and limitations of your health insurance plan.  Call member services at your health plan with any benefit or coverage questions.      Please bring the following with you to your appointment:    (1) Any X-Rays, CTs or MRIs which have been performed.  Contact the facility where they were done to arrange for  prior to your scheduled appointment.    (2) List of current medications  (3) This referral request   (4) Any documents/labs given to you for this referral                  Your next 10 appointments already scheduled     Nov 06, 2018 11:20 AM CST   New Visit with Cesar Tucker,    [facility] ([facility])    [address] [place] [address] [id number] [phone]              Who to contact     If you have questions or need follow up information about today's clinic visit or your schedule please contact [facility] directly at [phone].  Normal or non-critical lab and imaging results will be communicated to you by MyChart, letter or phone within 4 business days after the clinic has received the results. If you do not hear from us within 7 days, please contact the clinic through MyChart or phone. If you have a critical or abnormal lab result, we will notify you by phone as  soon as possible.  Submit refill requests through [employer] or call your pharmacy and they will forward the refill request to us. Please allow 3 business days for your refill to be completed.          Additional Information About Your Visit        [employer] Information     [employer] gives you secure access to your electronic health record. If you see a primary care provider, you can also send messages to your care team and make appointments. If you have questions, please call your primary care clinic.  If you do not have a primary care provider, please call [phone] and they will assist you.        Care EveryWhere ID     This is your Care EveryWhere ID. This could be used by other organizations to access your [place] medical records  [id number]        Your Vitals Were     Pulse Temperature Respirations Last Period Pulse Oximetry BMI (Body Mass Index)    70 98.6  F (37  C) (Temporal) 16 04/13/2001 100% 20.19 kg/m2       Blood Pressure from Last 3 Encounters:   10/30/18 104/72   10/21/18 112/74   06/04/18 119/73    Weight from Last 3 Encounters:   10/30/18 114 lb (51.7 kg)   10/21/18 117 lb 8 oz (53.3 kg)   02/26/18 113 lb (51.3 kg)              We Performed the Following     ALLERGY/ASTHMA ADULT REFERRAL        Primary Care Provider Office Phone # Fax #    Lindsay Padgett [phone]       [address]  [facility] [address]        Equal Access to Services     [facility]: Hadii aad ku hadasho Somistiali, waaxda luqadaha, qaybta kaalmada adeegyada, kenneth ma . So [facility] [phone].    ATENCIÓN: Si habla español, tiene a newman disposición servicios gratuitos de asistencia lingüística. Alfonso al [phone].    We comply with applicable federal civil rights laws and [place] laws. We do not discriminate on the basis of race, color, national origin, age, disability, sex, sexual orientation, or gender identity.            Thank you!     Thank you for choosing  [facility]  for your care. Our goal is always to provide you with excellent care. Hearing back from our patients is one way we can continue to improve our services. Please take a few minutes to complete the written survey that you may receive in the mail after your visit with us. Thank you!             Your Updated Medication List - Protect others around you: Learn how to safely use, store and throw away your medicines at www.disposemymeds.org.          This list is accurate as of 10/30/18 11:29 AM.  Always use your most recent med list.                   Brand Name Dispense Instructions for use Diagnosis    ALPRAZolam 0.25 MG tablet    XANAX    20 tablet    Take 1-2 tablets (0.25-0.5 mg) by mouth as needed for anxiety    Anxiety       amoxicillin-clavulanate 500-125 MG per tablet    AUGMENTIN    30 tablet    Take 1 tablet by mouth 3 times daily    Diverticulitis of colon       ascorbic acid 500 MG tablet    VITAMIN C    0    1 TABLET TWICE DAILY        aspirin 81 MG EC tablet      Take 81 mg by mouth daily.        CALCIUM /VITAMIN D TABS   OR     0    1 TABLET DAILY        docusate sodium 100 MG tablet    COLACE    60 tablet    Take 100 mg by mouth daily.    LLQ abdominal pain, Diverticulosis of colon       fish oil-omega-3 fatty acids 1000 MG capsule      Take 1 g by mouth daily.        HYDROcodone-acetaminophen 5-325 MG per tablet    NORCO    10 tablet    Take 1 tablet by mouth every 6 hours as needed for severe pain        IBUPROFEN PO      Take 200 mg by mouth as needed.        latanoprost 0.005 % ophthalmic solution    XALATAN     daily        levothyroxine 88 MCG tablet    SYNTHROID/LEVOTHROID    90 tablet    TAKE ONE TABLET BY MOUTH EVERY MORNING    Hypothyroidism, unspecified type       magnesium 250 MG tablet      Take 1 tablet by mouth daily.        MILK OF MAGNESIA 400 MG/5ML suspension   Generic drug:  magnesium hydroxide     360 mL    Take 30-60 mLs by mouth daily as needed for  constipation.    LLQ abdominal pain, Diverticulosis of colon       Multiple vitamin Caps      daily        phenazopyridine 200 MG tablet    PYRIDIUM    6 tablet    Take 1 tablet (200 mg) by mouth 3 times daily as needed    Dysuria       TYLENOL PO           vitamin E 400 UNIT capsule     30 capsule    Two times weekly

## 2018-11-06 ENCOUNTER — OFFICE VISIT (OUTPATIENT)
Dept: ALLERGY | Facility: OTHER | Age: 58
End: 2018-11-06
Payer: COMMERCIAL

## 2018-11-06 VITALS
OXYGEN SATURATION: 99 % | SYSTOLIC BLOOD PRESSURE: 112 MMHG | DIASTOLIC BLOOD PRESSURE: 70 MMHG | WEIGHT: 114 LBS | HEIGHT: 63 IN | RESPIRATION RATE: 14 BRPM | TEMPERATURE: 97.8 F | BODY MASS INDEX: 20.2 KG/M2

## 2018-11-06 DIAGNOSIS — K59.09 CHRONIC CONSTIPATION: ICD-10-CM

## 2018-11-06 DIAGNOSIS — K57.32 DIVERTICULITIS OF COLON: ICD-10-CM

## 2018-11-06 DIAGNOSIS — R10.9 ABDOMINAL CRAMPING: ICD-10-CM

## 2018-11-06 PROCEDURE — 99243 OFF/OP CNSLTJ NEW/EST LOW 30: CPT | Performed by: ALLERGY & IMMUNOLOGY

## 2018-11-06 NOTE — LETTER
11/6/2018         RE: Umm Claros  [address] [facility] [address]        Dear Colleague,    Thank you for referring your patient, Umm Claros, to the [facility]. Please see a copy of my visit note below.    Umm Claros is a 57 year old White female with previous medical history significant for diverticulitis, hypothyroidism. Umm Claros is being seen today for evaluation of abdominal pain. The patient is being seen in consultation at the request of Dr. Dayron MD.     Patient presents today as a new patient evaluation.  She has a history of recurrent diverticulitis.  This started flaring in the early 2000.  She had more recently had a flare in 2009 in 2012.  She reports over the last couple of years she has had 3-4 flares per year.  She has not recently seen a gastroenterologist.  No recent endoscopy done.  She reports a colonoscopy was done approximately 5 years ago.  She reports that in between episodes of diverticulitis she has minimal abdominal symptoms.  She denies diarrhea or vomiting.  She does endorse intermittent abdominal pain, cramping and bloating.  She reports that she struggles with constipation.  She will have a bowel movement anywhere from every day to every 2-3 days.  She consumes plenty of water.  She reports her fiber intake is adequate.  She thinks that with consuming milk and ice cream she will have increased gas and bloating.  She consumes cheese without symptoms.  She thinks with eating wheat she would have increased symptoms.  She has been on a gluten-free diet for the last 1.5 weeks.  She is concerned about food allergy or intolerances causing her to have recurrent diverticulitis or other abdominal symptoms.    The patient has no history of asthma, eczema, food allergies, medications allergies or hives.     ENVIRONMENTAL HISTORY: [place] The home is heated with a electric furnace. They do have  central air conditioning. The patient's bedroom is furnished with hard cici in bedroom.  Pets inside the house include 2 cat(s). There is no history of cockroach or mice infestation. There is/are 0 smokers in the house.  The house does not have a damp basement.     Past Medical History:   Diagnosis Date     Anxiety state, unspecified      Depressive disorder, not elsewhere classified     depression     Raynaud's syndrome      Tobacco use disorder      Unspecified hypothyroidism      Family History   Problem Relation Age of Onset     Cancer Paternal Grandmother      breast cancer     Gynecology Mother      endometriosis     Cancer Father      prostate cancer     Alcohol/Drug Father      alcohol problems and smoker     Past Surgical History:   Procedure Laterality Date     C APPENDECTOMY  05/26/00    Laparoscopic drainage of left ovarian cyst and lysis of adhesions, dilatation and curettage, and laparoscopic appendectomy performed by Dr. Ryan     COLONOSCOPY  4/10/2012    Procedure:COLONOSCOPY; Colonoscopy; Surgeon:BELL LAYTON; Location: GI     HC REMOVAL OF OVARY/TUBE(S)  01/30/01    Laparoscopic left sided salpingo-oophorectomy. Cystoscopy     HC REMOVE TONSILS/ADENOIDS,<13 Y/O      T & A <12y.o.       REVIEW OF SYSTEMS:  General: negative for weight gain. negative for weight loss. negative for changes in sleep.   Ears: positive  for fullness. negative for hearing loss. positive  for dizziness.   Nose: negative for snoring.negative for changes in smell. negative for drainage.   Eyes: negative for eye watering. negative for eye itching. negative for vision changes. negative for eye redness.  Throat: negative for hoarseness. negative for sore throat. negative for trouble swallowing.   Lungs: negative for shortness of breath.negative for wheezing. negative for sputum production.   Cardiovascular: negative for chest pain. negative for swelling of ankles. negative for fast or irregular heartbeat.    Gastrointestinal: negative for nausea. negative for heartburn. negative for acid reflux.   Musculoskeletal: positive  for joint pain. negative for joint stiffness. negative for joint swelling.   Neurologic: negative for seizures. negative for fainting. negative for weakness.   Psychiatric: negative for changes in mood. negative for anxiety.   Endocrine: negative for cold intolerance. negative for heat intolerance. negative for tremors.   Lymphatic: negative for lower extremity swelling. negative for lymph node swelling.   Hematologic: negative for easy bruising. negative for easy bleeding.  Integumentary: negative for rash. negative for scaling. negative for nail changes.        Current Outpatient Prescriptions:      Acetaminophen (TYLENOL PO), , Disp: , Rfl:      ALPRAZolam (XANAX) 0.25 MG tablet, Take 1-2 tablets (0.25-0.5 mg) by mouth as needed for anxiety, Disp: 20 tablet, Rfl: 0     amoxicillin-clavulanate (AUGMENTIN) 500-125 MG per tablet, Take 1 tablet by mouth 3 times daily, Disp: 30 tablet, Rfl: 0     aspirin 81 MG EC tablet, Take 81 mg by mouth daily., Disp: , Rfl:      CALCIUM /VITAMIN D TABS   OR, 1 TABLET DAILY, Disp: 0, Rfl: 0     docusate sodium 100 MG tablet, Take 100 mg by mouth daily., Disp: 60 tablet, Rfl: 1     fish oil-omega-3 fatty acids (FISH OIL) 1000 MG capsule, Take 1 g by mouth daily., Disp: , Rfl:      HYDROcodone-acetaminophen (NORCO) 5-325 MG per tablet, Take 1 tablet by mouth every 6 hours as needed for severe pain, Disp: 10 tablet, Rfl: 0     IBUPROFEN PO, Take 200 mg by mouth as needed., Disp: , Rfl:      latanoprost (XALATAN) 0.005 % ophthalmic solution, daily, Disp: , Rfl: 1     levothyroxine (SYNTHROID/LEVOTHROID) 88 MCG tablet, TAKE ONE TABLET BY MOUTH EVERY MORNING, Disp: 90 tablet, Rfl: 3     Magnesium 250 MG tablet, Take 1 tablet by mouth daily., Disp: , Rfl:      magnesium hydroxide (MILK OF MAGNESIA) 400 MG/5ML suspension, Take 30-60 mLs by mouth daily as needed for  constipation., Disp: 360 mL, Rfl: 1     MULTIPLE VITAMIN CAPS   OR, daily, Disp: , Rfl: 0     phenazopyridine (PYRIDIUM) 200 MG tablet, Take 1 tablet (200 mg) by mouth 3 times daily as needed, Disp: 6 tablet, Rfl: 0     VITAMIN C TABS 500 MG OR, 1 TABLET TWICE DAILY, Disp: 0, Rfl: 0     vitamin E 400 UNIT capsule, Two times weekly, Disp: 30 capsule, Rfl: 0  Immunization History   Administered Date(s) Administered     Influenza (IIV3) PF 11/14/2001, 11/28/2003, 11/16/2005, 12/04/2006, 09/16/2009, 11/02/2012     Influenza Vaccine IM 3yrs+ 4 Valent IIV4 11/18/2014, 11/18/2015, 11/06/2017, 10/12/2018     Influenza Vaccine, 3 YRS +, IM (QUADRIVALENT W/PRESERVATIVES) 11/04/2013     Mantoux Tuberculin Skin Test 09/16/2009     TD (ADULT, 7+) 06/20/1990, 10/03/2005     TDAP Vaccine (Adacel) 05/04/2015     Allergies   Allergen Reactions     No Known Allergies      Tramadol      Other reaction(s): Other - Describe In Comment Field  Dizzy and double vision         EXAM:   Constitutional:  Appears well-developed and well-nourished. No distress.   HEENT:   Head: Normocephalic.   Mouth/Throat: No oropharyngeal exudate present.   No cobblestoning of posterior oropharynx.   Nasal tissue pink and normal appearing.  No rhinorrhea noted.    Eyes: Conjunctivae are non-erythematous   No maxillary or frontal sinus tenderness to palpation.   Cardiovascular: Normal rate, regular rhythm and normal heart sounds. Exam reveals no gallop and no friction rub.   No murmur heard.  Respiratory: Effort normal and breath sounds normal. No respiratory distress. No wheezes. No rales.   Musculoskeletal: Normal range of motion.   Gastro: Soft, normal bowel sounds, no masses noted.  Mild tenderness noted involving left lower and mid quadrants.  Lymphadenopathy:   No cervical adenopathy.   No lower extremity edema.   Neuro: Oriented to person, place, and time.  Skin: Skin is warm and dry. No rash noted.   Psychiatric: Normal mood and affect.     Nursing  note and vitals reviewed.      ASSESSMENT/PLAN:  Problem List Items Addressed This Visit        Nervous and Auditory    Abdominal cramping     History of recurrent diverticulitis. Discussed with patient that I do not think food allergy or intolerances are contributing to recurrent diverticulitis. She does have some intermittent constipation and more persistent bloating and cramping without diarrhea or vomiting that occur independent of diverticulitis flares. She believes milk and wheat make her symptoms worse. She denies skin rash. No IgE mediated food allergy symptoms. She recently finished antibiotics for diverticulitis flare. Still with some mild pain and tenderness involving left lower quadrants.     - The patient request celiac testing and this was done with some cramping and bloating. She also could trial complete milk avoidance to ascertain if helpful. I did discuss that I did not think foods are contributing to recurrent diverticulitis with the patient.   - GI referral. If abdominal pain worsens then she should present to ER.   - Return to clinic as needed.          Relevant Orders    IgA [LAB73]    Tissue transglutaminase bay IgA and IgG [[id number]]    Endomysial Antibody IgA by IFA [[id number]]    GASTROENTEROLOGY ADULT REF CONSULT ONLY       Digestive    Chronic constipation    Diverticulitis of colon    Relevant Orders    GASTROENTEROLOGY ADULT REF CONSULT ONLY        Return as needed.     Chart documentation with Dragon Voice recognition Software. Although reviewed after completion, some words and grammatical errors may remain.    Cesar Tucker,    Allergy/Immunology  [facility] [place], [place] and [facility]      Again, thank you for allowing me to participate in the care of your patient.        Sincerely,        Cesar Tucker, DO

## 2018-11-06 NOTE — PROGRESS NOTES
Umm Claros is a 57 year old White female with previous medical history significant for diverticulitis, hypothyroidism. Umm Claros is being seen today for evaluation of abdominal pain. The patient is being seen in consultation at the request of Dr. Dayron MD.     Patient presents today as a new patient evaluation.  She has a history of recurrent diverticulitis.  This started flaring in the early 2000.  She had more recently had a flare in 2009 in 2012.  She reports over the last couple of years she has had 3-4 flares per year.  She has not recently seen a gastroenterologist.  No recent endoscopy done.  She reports a colonoscopy was done approximately 5 years ago.  She reports that in between episodes of diverticulitis she has minimal abdominal symptoms.  She denies diarrhea or vomiting.  She does endorse intermittent abdominal pain, cramping and bloating.  She reports that she struggles with constipation.  She will have a bowel movement anywhere from every day to every 2-3 days.  She consumes plenty of water.  She reports her fiber intake is adequate.  She thinks that with consuming milk and ice cream she will have increased gas and bloating.  She consumes cheese without symptoms.  She thinks with eating wheat she would have increased symptoms.  She has been on a gluten-free diet for the last 1.5 weeks.  She is concerned about food allergy or intolerances causing her to have recurrent diverticulitis or other abdominal symptoms.    The patient has no history of asthma, eczema, food allergies, medications allergies or hives.     ENVIRONMENTAL HISTORY: [place] The home is heated with a electric furnace. They do have central air conditioning. The patient's bedroom is furnished with hard cici in bedroom.  Pets inside the house include 2 cat(s). There is no history of cockroach or mice infestation. There is/are 0 smokers in the house.  The house does not have  a damp basement.     Past Medical History:   Diagnosis Date     Anxiety state, unspecified      Depressive disorder, not elsewhere classified     depression     Raynaud's syndrome      Tobacco use disorder      Unspecified hypothyroidism      Family History   Problem Relation Age of Onset     Cancer Paternal Grandmother      breast cancer     Gynecology Mother      endometriosis     Cancer Father      prostate cancer     Alcohol/Drug Father      alcohol problems and smoker     Past Surgical History:   Procedure Laterality Date     C APPENDECTOMY  05/26/00    Laparoscopic drainage of left ovarian cyst and lysis of adhesions, dilatation and curettage, and laparoscopic appendectomy performed by Dr. Ryan     COLONOSCOPY  4/10/2012    Procedure:COLONOSCOPY; Colonoscopy; Surgeon:BELL LAYTON; Location:[facility]     HC REMOVAL OF OVARY/TUBE(S)  01/30/01    Laparoscopic left sided salpingo-oophorectomy. Cystoscopy     HC REMOVE TONSILS/ADENOIDS,<11 Y/O      T & A <12y.o.       REVIEW OF SYSTEMS:  General: negative for weight gain. negative for weight loss. negative for changes in sleep.   Ears: positive  for fullness. negative for hearing loss. positive  for dizziness.   Nose: negative for snoring.negative for changes in smell. negative for drainage.   Eyes: negative for eye watering. negative for eye itching. negative for vision changes. negative for eye redness.  Throat: negative for hoarseness. negative for sore throat. negative for trouble swallowing.   Lungs: negative for shortness of breath.negative for wheezing. negative for sputum production.   Cardiovascular: negative for chest pain. negative for swelling of ankles. negative for fast or irregular heartbeat.   Gastrointestinal: negative for nausea. negative for heartburn. negative for acid reflux.   Musculoskeletal: positive  for joint pain. negative for joint stiffness. negative for joint swelling.   Neurologic: negative for seizures. negative for fainting.  negative for weakness.   Psychiatric: negative for changes in mood. negative for anxiety.   Endocrine: negative for cold intolerance. negative for heat intolerance. negative for tremors.   Lymphatic: negative for lower extremity swelling. negative for lymph node swelling.   Hematologic: negative for easy bruising. negative for easy bleeding.  Integumentary: negative for rash. negative for scaling. negative for nail changes.        Current Outpatient Prescriptions:      Acetaminophen (TYLENOL PO), , Disp: , Rfl:      ALPRAZolam (XANAX) 0.25 MG tablet, Take 1-2 tablets (0.25-0.5 mg) by mouth as needed for anxiety, Disp: 20 tablet, Rfl: 0     amoxicillin-clavulanate (AUGMENTIN) 500-125 MG per tablet, Take 1 tablet by mouth 3 times daily, Disp: 30 tablet, Rfl: 0     aspirin 81 MG EC tablet, Take 81 mg by mouth daily., Disp: , Rfl:      CALCIUM /VITAMIN D TABS   OR, 1 TABLET DAILY, Disp: 0, Rfl: 0     docusate sodium 100 MG tablet, Take 100 mg by mouth daily., Disp: 60 tablet, Rfl: 1     fish oil-omega-3 fatty acids (FISH OIL) 1000 MG capsule, Take 1 g by mouth daily., Disp: , Rfl:      HYDROcodone-acetaminophen (NORCO) 5-325 MG per tablet, Take 1 tablet by mouth every 6 hours as needed for severe pain, Disp: 10 tablet, Rfl: 0     IBUPROFEN PO, Take 200 mg by mouth as needed., Disp: , Rfl:      latanoprost (XALATAN) 0.005 % ophthalmic solution, daily, Disp: , Rfl: 1     levothyroxine (SYNTHROID/LEVOTHROID) 88 MCG tablet, TAKE ONE TABLET BY MOUTH EVERY MORNING, Disp: 90 tablet, Rfl: 3     Magnesium 250 MG tablet, Take 1 tablet by mouth daily., Disp: , Rfl:      magnesium hydroxide (MILK OF MAGNESIA) 400 MG/5ML suspension, Take 30-60 mLs by mouth daily as needed for constipation., Disp: 360 mL, Rfl: 1     MULTIPLE VITAMIN CAPS   OR, daily, Disp: , Rfl: 0     phenazopyridine (PYRIDIUM) 200 MG tablet, Take 1 tablet (200 mg) by mouth 3 times daily as needed, Disp: 6 tablet, Rfl: 0     VITAMIN C TABS 500 MG OR, 1 TABLET TWICE  DAILY, Disp: 0, Rfl: 0     vitamin E 400 UNIT capsule, Two times weekly, Disp: 30 capsule, Rfl: 0  Immunization History   Administered Date(s) Administered     Influenza (IIV3) PF 11/14/2001, 11/28/2003, 11/16/2005, 12/04/2006, 09/16/2009, 11/02/2012     Influenza Vaccine IM 3yrs+ 4 Valent IIV4 11/18/2014, 11/18/2015, 11/06/2017, 10/12/2018     Influenza Vaccine, 3 YRS +, IM (QUADRIVALENT W/PRESERVATIVES) 11/04/2013     Mantoux Tuberculin Skin Test 09/16/2009     TD (ADULT, 7+) 06/20/1990, 10/03/2005     TDAP Vaccine (Adacel) 05/04/2015     Allergies   Allergen Reactions     No Known Allergies      Tramadol      Other reaction(s): Other - Describe In Comment Field  Dizzy and double vision         EXAM:   Constitutional:  Appears well-developed and well-nourished. No distress.   HEENT:   Head: Normocephalic.   Mouth/Throat: No oropharyngeal exudate present.   No cobblestoning of posterior oropharynx.   Nasal tissue pink and normal appearing.  No rhinorrhea noted.    Eyes: Conjunctivae are non-erythematous   No maxillary or frontal sinus tenderness to palpation.   Cardiovascular: Normal rate, regular rhythm and normal heart sounds. Exam reveals no gallop and no friction rub.   No murmur heard.  Respiratory: Effort normal and breath sounds normal. No respiratory distress. No wheezes. No rales.   Musculoskeletal: Normal range of motion.   Gastro: Soft, normal bowel sounds, no masses noted.  Mild tenderness noted involving left lower and mid quadrants.  Lymphadenopathy:   No cervical adenopathy.   No lower extremity edema.   Neuro: Oriented to person, place, and time.  Skin: Skin is warm and dry. No rash noted.   Psychiatric: Normal mood and affect.     Nursing note and vitals reviewed.      ASSESSMENT/PLAN:  Problem List Items Addressed This Visit        Nervous and Auditory    Abdominal cramping     History of recurrent diverticulitis. Discussed with patient that I do not think food allergy or intolerances are  contributing to recurrent diverticulitis. She does have some intermittent constipation and more persistent bloating and cramping without diarrhea or vomiting that occur independent of diverticulitis flares. She believes milk and wheat make her symptoms worse. She denies skin rash. No IgE mediated food allergy symptoms. She recently finished antibiotics for diverticulitis flare. Still with some mild pain and tenderness involving left lower quadrants.     - The patient request celiac testing and this was done with some cramping and bloating. She also could trial complete milk avoidance to ascertain if helpful. I did discuss that I did not think foods are contributing to recurrent diverticulitis with the patient.   - GI referral. If abdominal pain worsens then she should present to ER.   - Return to clinic as needed.          Relevant Orders    IgA [LAB73]    Tissue transglutaminase bay IgA and IgG [[id number]]    Endomysial Antibody IgA by IFA [[id number]]    GASTROENTEROLOGY ADULT REF CONSULT ONLY       Digestive    Chronic constipation    Diverticulitis of colon    Relevant Orders    GASTROENTEROLOGY ADULT REF CONSULT ONLY        Return as needed.     Chart documentation with Dragon Voice recognition Software. Although reviewed after completion, some words and grammatical errors may remain.    Cesar Tucker,    Allergy/Immunology  [facility], [place] and [facility]

## 2018-11-06 NOTE — PATIENT INSTRUCTIONS
Allergy Staff Appt Hours Shot Hours Locations    Physician     Cesar Tucker, DO       Support Staff     Kelly LUCIA RN      Nyasia LUCIA, [facility]  Monday:                      [place] 8-7     Tuesday:         [place] 8-5     Wednesday:        [place]: 7-5     Friday:        [place] 7-5   [place]        Monday: 9-5:50        Wednesday: 2-5:50        Friday: 7-12:50     [place]        Tuesday: 7-10:50        Thursday: 1:30-6:30     [place] Monday: 7:10-4:50        Tuesday: 12:30-6:30        Thursday: 7-11:50 [facility]  [address] [place] [address]  Appt Line: [phone]  Allergy RN (Monday):  [phone]    [facility]  [address] [place] [address]  Appt Line: [phone]  Allergy RN (Tues & Wed):  [phone]    [facility]  [address] [place] [address]  Appt Line: [phone]  Allergy RN (Friday):  [phone]       Important Scheduling Information  Aspirin Desensitization: Appt will last 2 clinic days. Please call the Allergy RN line for your clinic to schedule. Discontinue antihistamines 7 days prior to the appointment.     Food Challenges: Appt will last 3-4 hours. Please call the Allergy RN line for your clinic to schedule. Discontinue antihistamines 7 days prior to the appointment.     Penicillin Testing: Appt will last 2-3 hours. Please call the Allergy RN line for your clinic to schedule. Discontinue antihistamines 7 days prior to the appointment.     Skin Testing: Appt will about 40 minutes. Call the appointment line for your clinic to schedule. Discontinue antihistamines 7 days prior to the appointment.     Venom Testing: Appt will last 2-3 hours. Please call the Allergy RN line for your clinic to schedule. Discontinue antihistamines 7 days prior to the appointment.     Thank you for trusting us with your Allergy, Asthma, and Immunology care. Please feel free to contact us with any questions or concerns you may have.      - Blood testing  for celiac disease today.   - GI referral.   - Milk complete avoidance for 2 weeks and then reintroduce for 2 weeks to ascertain if change.

## 2018-11-06 NOTE — MR AVS SNAPSHOT
After Visit Summary   11/6/2018    Umm Claros    MRN: [id number]           Patient Information     Date Of Birth          1960        Visit Information        Provider Department      11/6/2018 11:20 AM Cesar Tucker DO [facility]        Today's Diagnoses     Chronic constipation    -  1    Diverticulitis of colon        Abdominal cramping          Care Instructions    Allergy Staff Appt Hours Shot Hours Locations    Physician     Cesar Tucker DO       Support Staff     Kelly LUCIA RN      Nyasia LUCIA, [facility]  Monday:                      [place] 8-7     Tuesday:         [place] 8-5     Wednesday:        [place]: 7-5     Friday:        [place] 7-5   [place] Monday: 9-5:50        Wednesday: 2-5:50        Friday: 7-12:50     [place]        Tuesday: 7-10:50        Thursday: 1:30-6:30     [place] Monday: 7:10-4:50        Tuesday: 12:30-6:30        Thursday: 7-11:50 [facility]  [id number] [place] [address]  Appt Line: [phone]  Allergy RN (Monday):  [phone]    [facility]  [address] [place] [address]  Appt Line: [phone]  Allergy RN (Tues & Wed):  [phone]    [facility]  [address] [place] [address]  Appt Line: [phone]  Allergy RN (Friday):  [phone]       Important Scheduling Information  Aspirin Desensitization: Appt will last 2 clinic days. Please call the Allergy RN line for your clinic to schedule. Discontinue antihistamines 7 days prior to the appointment.     Food Challenges: Appt will last 3-4 hours. Please call the Allergy RN line for your clinic to schedule. Discontinue antihistamines 7 days prior to the appointment.     Penicillin Testing: Appt will last 2-3 hours. Please call the Allergy RN line for your clinic to schedule. Discontinue antihistamines 7 days prior to the appointment.     Skin Testing: Appt will about 40 minutes. Call the appointment line for your  clinic to schedule. Discontinue antihistamines 7 days prior to the appointment.     Venom Testing: Appt will last 2-3 hours. Please call the Allergy RN line for your clinic to schedule. Discontinue antihistamines 7 days prior to the appointment.     Thank you for trusting us with your Allergy, Asthma, and Immunology care. Please feel free to contact us with any questions or concerns you may have.      - Blood testing for celiac disease today.   - GI referral.   - Milk complete avoidance for 2 weeks and then reintroduce for 2 weeks to ascertain if change.           Follow-ups after your visit        Additional Services     GASTROENTEROLOGY ADULT REF CONSULT ONLY       [facility] Location: [facility]: [phone]      Please be aware that coverage of these services is subject to the terms and limitations of your health insurance plan.  Call member services at your health plan with any benefit or coverage questions.  Any procedures must be performed at a [place] facility OR coordinated by your clinic's referral office.    Please bring the following with you to your appointment:    (1) Any X-Rays, CTs or MRIs which have been performed.  Contact the facility where they were done to arrange for  prior to your scheduled appointment.    (2) List of current medications   (3) This referral request   (4) Any documents/labs given to you for this referral                  Future tests that were ordered for you today     Open Future Orders        Priority Expected Expires Ordered    IgA [LAB73] Routine  11/6/2019 11/6/2018    Tissue transglutaminase bay IgA and IgG [KFR9840] Routine  11/6/2019 11/6/2018    Endomysial Antibody IgA by IFA [[id number]] Routine  11/6/2019 11/6/2018            Who to contact     If you have questions or need follow up information about today's clinic visit or your schedule please contact [facility] directly at [phone].  Normal or non-critical lab and imaging  "results will be communicated to you by MyChart, letter or phone within 4 business days after the clinic has received the results. If you do not hear from us within 7 days, please contact the clinic through Gazelle or phone. If you have a critical or abnormal lab result, we will notify you by phone as soon as possible.  Submit refill requests through [facility]elle or call your pharmacy and they will forward the refill request to us. Please allow 3 business days for your refill to be completed.          Additional Information About Your Visit        Gazelle Information     [facility]elle gives you secure access to your electronic health record. If you see a primary care provider, you can also send messages to your care team and make appointments. If you have questions, please call your primary care clinic.  If you do not have a primary care provider, please call [phone] and they will assist you.        Care EveryWhere ID     This is your Care EveryWhere ID. This could be used by other organizations to access your [place] medical records  [id number]        Your Vitals Were     Temperature Respirations Height Last Period Pulse Oximetry BMI (Body Mass Index)    97.8  F (36.6  C) (Oral) 14 1.6 m (5' 3\") 04/13/2001 99% 20.19 kg/m2       Blood Pressure from Last 3 Encounters:   11/06/18 112/70   10/30/18 104/72   10/21/18 112/74    Weight from Last 3 Encounters:   11/06/18 51.7 kg (114 lb)   10/30/18 51.7 kg (114 lb)   10/21/18 53.3 kg (117 lb 8 oz)              We Performed the Following     GASTROENTEROLOGY ADULT REF CONSULT ONLY        Primary Care Provider Office Phone # Fax #    Lindsay Padgett [phone]       [address]  [facility] [address]        Equal Access to Services     ANGIE NOONAN : Mike Schrader, jong stubbs, kenneth bonilla. So [facility] [phone].    ATENCIÓN: Si habla español, tiene a newman disposición servicios " kun de asistencia lingüística. Alfonso santos [phone].    We comply with applicable federal civil rights laws and [place] laws. We do not discriminate on the basis of race, color, national origin, age, disability, sex, sexual orientation, or gender identity.            Thank you!     Thank you for choosing [facility]  for your care. Our goal is always to provide you with excellent care. Hearing back from our patients is one way we can continue to improve our services. Please take a few minutes to complete the written survey that you may receive in the mail after your visit with us. Thank you!             Your Updated Medication List - Protect others around you: Learn how to safely use, store and throw away your medicines at www.disposemymeds.org.          This list is accurate as of 11/6/18 11:56 AM.  Always use your most recent med list.                   Brand Name Dispense Instructions for use Diagnosis    ALPRAZolam 0.25 MG tablet    XANAX    20 tablet    Take 1-2 tablets (0.25-0.5 mg) by mouth as needed for anxiety    Anxiety       amoxicillin-clavulanate 500-125 MG per tablet    AUGMENTIN    30 tablet    Take 1 tablet by mouth 3 times daily    Diverticulitis of colon       ascorbic acid 500 MG tablet    VITAMIN C    0    1 TABLET TWICE DAILY        aspirin 81 MG EC tablet      Take 81 mg by mouth daily.        CALCIUM /VITAMIN D TABS   OR     0    1 TABLET DAILY        docusate sodium 100 MG tablet    COLACE    60 tablet    Take 100 mg by mouth daily.    LLQ abdominal pain, Diverticulosis of colon       fish oil-omega-3 fatty acids 1000 MG capsule      Take 1 g by mouth daily.        HYDROcodone-acetaminophen 5-325 MG per tablet    NORCO    10 tablet    Take 1 tablet by mouth every 6 hours as needed for severe pain        IBUPROFEN PO      Take 200 mg by mouth as needed.        latanoprost 0.005 % ophthalmic solution    XALATAN     daily        levothyroxine 88 MCG tablet     SYNTHROID/LEVOTHROID    90 tablet    TAKE ONE TABLET BY MOUTH EVERY MORNING    Hypothyroidism, unspecified type       magnesium 250 MG tablet      Take 1 tablet by mouth daily.        MILK OF MAGNESIA 400 MG/5ML suspension   Generic drug:  magnesium hydroxide     360 mL    Take 30-60 mLs by mouth daily as needed for constipation.    LLQ abdominal pain, Diverticulosis of colon       Multiple vitamin Caps      daily        phenazopyridine 200 MG tablet    PYRIDIUM    6 tablet    Take 1 tablet (200 mg) by mouth 3 times daily as needed    Dysuria       TYLENOL PO           vitamin E 400 UNIT capsule     30 capsule    Two times weekly

## 2018-11-06 NOTE — ASSESSMENT & PLAN NOTE
History of recurrent diverticulitis. Discussed with patient that I do not think food allergy or intolerances are contributing to recurrent diverticulitis. She does have some intermittent constipation and more persistent bloating and cramping without diarrhea or vomiting that occur independent of diverticulitis flares. She believes milk and wheat make her symptoms worse. She denies skin rash. No IgE mediated food allergy symptoms. She recently finished antibiotics for diverticulitis flare. Still with some mild pain and tenderness involving left lower quadrants.     - The patient request celiac testing and this was done with some cramping and bloating. She also could trial complete milk avoidance to ascertain if helpful. I did discuss that I did not think foods are contributing to recurrent diverticulitis with the patient.   - GI referral. If abdominal pain worsens then she should present to ER.   - Return to clinic as needed.

## 2018-11-07 DIAGNOSIS — R10.9 ABDOMINAL CRAMPING: ICD-10-CM

## 2018-11-07 PROCEDURE — 83516 IMMUNOASSAY NONANTIBODY: CPT | Mod: 91 | Performed by: ALLERGY & IMMUNOLOGY

## 2018-11-07 PROCEDURE — 82784 ASSAY IGA/IGD/IGG/IGM EACH: CPT | Performed by: ALLERGY & IMMUNOLOGY

## 2018-11-07 PROCEDURE — 36415 COLL VENOUS BLD VENIPUNCTURE: CPT | Performed by: ALLERGY & IMMUNOLOGY

## 2018-11-07 PROCEDURE — 83516 IMMUNOASSAY NONANTIBODY: CPT | Performed by: ALLERGY & IMMUNOLOGY

## 2018-11-07 PROCEDURE — 86256 FLUORESCENT ANTIBODY TITER: CPT | Mod: 90 | Performed by: ALLERGY & IMMUNOLOGY

## 2018-11-07 PROCEDURE — 99000 SPECIMEN HANDLING OFFICE-LAB: CPT | Performed by: ALLERGY & IMMUNOLOGY

## 2018-11-08 LAB
IGA SERPL-MCNC: 284 MG/DL (ref 70–380)
TTG IGA SER-ACNC: 1 U/ML
TTG IGG SER-ACNC: <1 U/ML

## 2018-11-09 LAB — ENDOMYSIUM IGA TITR SER IF: NORMAL {TITER}

## 2018-11-10 ENCOUNTER — HOSPITAL ENCOUNTER (EMERGENCY)
Facility: CLINIC | Age: 58
Discharge: HOME | End: 2018-11-10
Attending: EMERGENCY MEDICINE | Admitting: EMERGENCY MEDICINE
Payer: COMMERCIAL

## 2018-11-10 ENCOUNTER — APPOINTMENT (OUTPATIENT)
Dept: CT IMAGING | Facility: CLINIC | Age: 58
End: 2018-11-10
Attending: EMERGENCY MEDICINE
Payer: COMMERCIAL

## 2018-11-10 VITALS
RESPIRATION RATE: 16 BRPM | OXYGEN SATURATION: 99 % | TEMPERATURE: 98.4 F | SYSTOLIC BLOOD PRESSURE: 108 MMHG | HEART RATE: 99 BPM | DIASTOLIC BLOOD PRESSURE: 72 MMHG

## 2018-11-10 DIAGNOSIS — K57.32 DIVERTICULITIS OF COLON: ICD-10-CM

## 2018-11-10 LAB
ALBUMIN SERPL-MCNC: 3.9 G/DL (ref 3.4–5)
ALBUMIN UR-MCNC: NEGATIVE MG/DL
ALP SERPL-CCNC: 63 U/L (ref 40–150)
ALT SERPL W P-5'-P-CCNC: 17 U/L (ref 0–50)
AMORPH CRY #/AREA URNS HPF: ABNORMAL /HPF
ANION GAP SERPL CALCULATED.3IONS-SCNC: 8 MMOL/L (ref 3–14)
APPEARANCE UR: ABNORMAL
AST SERPL W P-5'-P-CCNC: 16 U/L (ref 0–45)
BASOPHILS # BLD AUTO: 0 10E9/L (ref 0–0.2)
BASOPHILS NFR BLD AUTO: 0.5 %
BILIRUB SERPL-MCNC: 0.5 MG/DL (ref 0.2–1.3)
BILIRUB UR QL STRIP: NEGATIVE
BUN SERPL-MCNC: 11 MG/DL (ref 7–30)
CALCIUM SERPL-MCNC: 8.8 MG/DL (ref 8.5–10.1)
CHLORIDE SERPL-SCNC: 107 MMOL/L (ref 94–109)
CO2 SERPL-SCNC: 27 MMOL/L (ref 20–32)
COLOR UR AUTO: YELLOW
CREAT SERPL-MCNC: 0.88 MG/DL (ref 0.52–1.04)
DIFFERENTIAL METHOD BLD: NORMAL
EOSINOPHIL NFR BLD AUTO: 0.4 %
ERYTHROCYTE [DISTWIDTH] IN BLOOD BY AUTOMATED COUNT: 11.8 % (ref 10–15)
GFR SERPL CREATININE-BSD FRML MDRD: 66 ML/MIN/1.7M2
GLUCOSE SERPL-MCNC: 110 MG/DL (ref 70–99)
GLUCOSE UR STRIP-MCNC: NEGATIVE MG/DL
HCT VFR BLD AUTO: 41.6 % (ref 35–47)
HGB BLD-MCNC: 13.9 G/DL (ref 11.7–15.7)
HGB UR QL STRIP: NEGATIVE
IMM GRANULOCYTES # BLD: 0 10E9/L (ref 0–0.4)
IMM GRANULOCYTES NFR BLD: 0.5 %
KETONES UR STRIP-MCNC: NEGATIVE MG/DL
LEUKOCYTE ESTERASE UR QL STRIP: NEGATIVE
LIPASE SERPL-CCNC: 166 U/L (ref 73–393)
LYMPHOCYTES # BLD AUTO: 0.9 10E9/L (ref 0.8–5.3)
LYMPHOCYTES NFR BLD AUTO: 11 %
MCH RBC QN AUTO: 32.2 PG (ref 26.5–33)
MCHC RBC AUTO-ENTMCNC: 33.4 G/DL (ref 31.5–36.5)
MCV RBC AUTO: 96 FL (ref 78–100)
MONOCYTES # BLD AUTO: 0.7 10E9/L (ref 0–1.3)
MONOCYTES NFR BLD AUTO: 8.5 %
MUCOUS THREADS #/AREA URNS LPF: PRESENT /LPF
NEUTROPHILS # BLD AUTO: 6.7 10E9/L (ref 1.6–8.3)
NEUTROPHILS NFR BLD AUTO: 79.1 %
NITRATE UR QL: NEGATIVE
NRBC # BLD AUTO: 0 10*3/UL
NRBC BLD AUTO-RTO: 0 /100
PH UR STRIP: 7 PH (ref 5–7)
PLATELET # BLD AUTO: 228 10E9/L (ref 150–450)
POTASSIUM SERPL-SCNC: 3.9 MMOL/L (ref 3.4–5.3)
PROT SERPL-MCNC: 7.2 G/DL (ref 6.8–8.8)
RBC # BLD AUTO: 4.32 10E12/L (ref 3.8–5.2)
RBC #/AREA URNS AUTO: <1 /HPF (ref 0–2)
SODIUM SERPL-SCNC: 142 MMOL/L (ref 133–144)
SOURCE: ABNORMAL
SP GR UR STRIP: 1.01 (ref 1–1.03)
UROBILINOGEN UR STRIP-MCNC: 0 MG/DL (ref 0–2)
WBC # BLD AUTO: 8.4 10E9/L (ref 4–11)
WBC #/AREA URNS AUTO: 2 /HPF (ref 0–5)

## 2018-11-10 PROCEDURE — 80053 COMPREHEN METABOLIC PANEL: CPT | Performed by: EMERGENCY MEDICINE

## 2018-11-10 PROCEDURE — 74177 CT ABD & PELVIS W/CONTRAST: CPT

## 2018-11-10 PROCEDURE — 99285 EMERGENCY DEPT VISIT HI MDM: CPT | Mod: 25 | Performed by: EMERGENCY MEDICINE

## 2018-11-10 PROCEDURE — 85025 COMPLETE CBC W/AUTO DIFF WBC: CPT | Performed by: EMERGENCY MEDICINE

## 2018-11-10 PROCEDURE — 25000128 H RX IP 250 OP 636: Performed by: EMERGENCY MEDICINE

## 2018-11-10 PROCEDURE — 99284 EMERGENCY DEPT VISIT MOD MDM: CPT | Mod: Z6 | Performed by: EMERGENCY MEDICINE

## 2018-11-10 PROCEDURE — 83690 ASSAY OF LIPASE: CPT | Performed by: EMERGENCY MEDICINE

## 2018-11-10 PROCEDURE — 87086 URINE CULTURE/COLONY COUNT: CPT | Performed by: EMERGENCY MEDICINE

## 2018-11-10 PROCEDURE — 81001 URINALYSIS AUTO W/SCOPE: CPT | Performed by: EMERGENCY MEDICINE

## 2018-11-10 PROCEDURE — 96374 THER/PROPH/DIAG INJ IV PUSH: CPT | Performed by: EMERGENCY MEDICINE

## 2018-11-10 PROCEDURE — 25000125 ZZHC RX 250: Performed by: EMERGENCY MEDICINE

## 2018-11-10 RX ORDER — IOPAMIDOL 755 MG/ML
500 INJECTION, SOLUTION INTRAVASCULAR ONCE
Status: COMPLETED | OUTPATIENT
Start: 2018-11-10 | End: 2018-11-10

## 2018-11-10 RX ORDER — KETOROLAC TROMETHAMINE 30 MG/ML
30 INJECTION, SOLUTION INTRAMUSCULAR; INTRAVENOUS ONCE
Status: COMPLETED | OUTPATIENT
Start: 2018-11-10 | End: 2018-11-10

## 2018-11-10 RX ADMIN — IOPAMIDOL 60 ML: 755 INJECTION, SOLUTION INTRAVENOUS at 12:45

## 2018-11-10 RX ADMIN — SODIUM CHLORIDE 60 ML: 9 INJECTION, SOLUTION INTRAVENOUS at 12:45

## 2018-11-10 RX ADMIN — KETOROLAC TROMETHAMINE 30 MG: 30 INJECTION, SOLUTION INTRAMUSCULAR at 12:03

## 2018-11-10 NOTE — ED TRIAGE NOTES
Finished antibiotics for diverticulitis eleven days ago and continuing to have cramping and abd pain.

## 2018-11-10 NOTE — ED PROVIDER NOTES
"  History     Chief Complaint   Patient presents with     Abdominal Pain     The history is provided by the patient.     Umm Claros is a 57 year old female who presents to the emergency department with complaints of abdominal pain. The patient has had 6 flare ups of diverticulosis in the last 7 years. They have become more frequent over the last year. The first few episodes she was given Cipro, but says that made the tendons in her legs weak and \"give out\". She was in to see her primary on 10/20/18 and was given a prescription for Augmentin. She then came to the ED the next day and was seen by Dr. Ramirez. She was told to just continue with her prescription of antibiotics. The patient finished those antibiotics 11 days ago. She has continued to have pain and cramping in her lower abdomen. She feels like the pain is gradually getting worse. The abdominal pain has been waking her up during the night. She has been taking Ibuprofen and her last dose was this morning around 0630. The patient's bowel movements have been normal with no blood in her stool. She says that she has never had blood in her stool with her previous flare ups. The patient is also having urinary urgency, dysuria, and mild frequency. She has a pain on both sides of her abdomen that wraps around to her back.     Problem List:    Patient Active Problem List    Diagnosis Date Noted     Abdominal cramping 11/06/2018     Priority: Medium     Vaginal atrophy 02/26/2018     Priority: Medium     De Quervain's disease (tenosynovitis) 11/06/2017     Priority: Medium     Advance Care Planning 07/31/2017     Priority: Medium     Diverticulitis of colon 02/23/2017     Priority: Medium     Chronic constipation 03/14/2016     Priority: Medium     Osteopenia of prematurity 11/09/2012     Priority: Medium     Intradermal nevus 08/08/2011     Priority: Medium     right side of neck       Hypothyroidism 10/03/2005     Priority: Medium        Past Medical History:  "   Past Medical History:   Diagnosis Date     Anxiety state, unspecified      Depressive disorder, not elsewhere classified      Raynaud's syndrome      Tobacco use disorder      Unspecified hypothyroidism        Past Surgical History:    Past Surgical History:   Procedure Laterality Date     C APPENDECTOMY  05/26/00    Laparoscopic drainage of left ovarian cyst and lysis of adhesions, dilatation and curettage, and laparoscopic appendectomy performed by Dr. Ryan     COLONOSCOPY  4/10/2012    Procedure:COLONOSCOPY; Colonoscopy; Surgeon:BELL LAYTON; Location:[facility]     HC REMOVAL OF OVARY/TUBE(S)  01/30/01    Laparoscopic left sided salpingo-oophorectomy. Cystoscopy     HC REMOVE TONSILS/ADENOIDS,<13 Y/O      T & A <12y.o.       Family History:    Family History   Problem Relation Age of Onset     Cancer Paternal Grandmother      breast cancer     Gynecology Mother      endometriosis     Cancer Father      prostate cancer     Alcohol/Drug Father      alcohol problems and smoker       Social History:  Marital Status:   [2]  Social History   Substance Use Topics     Smoking status: Former Smoker     Years: 7.00     Quit date: 4/1/1993     Smokeless tobacco: Never Used     Alcohol use Yes      Comment: 3-5 drinks per week        Medications:      amoxicillin-clavulanate (AUGMENTIN) 875-125 MG per tablet   Acetaminophen (TYLENOL PO)   ALPRAZolam (XANAX) 0.25 MG tablet   aspirin 81 MG EC tablet   CALCIUM /VITAMIN D TABS   OR   docusate sodium 100 MG tablet   fish oil-omega-3 fatty acids (FISH OIL) 1000 MG capsule   HYDROcodone-acetaminophen (NORCO) 5-325 MG per tablet   IBUPROFEN PO   latanoprost (XALATAN) 0.005 % ophthalmic solution   levothyroxine (SYNTHROID/LEVOTHROID) 88 MCG tablet   Magnesium 250 MG tablet   magnesium hydroxide (MILK OF MAGNESIA) 400 MG/5ML suspension   MULTIPLE VITAMIN CAPS   OR   phenazopyridine (PYRIDIUM) 200 MG tablet   VITAMIN C TABS 500 MG OR   vitamin E 400 UNIT capsule          Review of Systems   All other systems reviewed and are negative.      Physical Exam   BP: (!) 111/91  Pulse: 71  Heart Rate: 90  Temp: 97.8  F (36.6  C)  Resp: 16  SpO2: 100 %      Physical Exam   Constitutional: She is oriented to person, place, and time. She appears well-developed and well-nourished. No distress.   HENT:   Head: Normocephalic and atraumatic.   Right Ear: External ear normal.   Left Ear: External ear normal.   Nose: Nose normal.   Neck: Normal range of motion. Neck supple.   Cardiovascular: Normal rate and normal heart sounds.  Exam reveals no gallop and no friction rub.    No murmur heard.  Pulmonary/Chest: Effort normal and breath sounds normal. No respiratory distress. She has no wheezes. She has no rales. She exhibits no tenderness.   Abdominal: She exhibits no distension and no mass. There is tenderness (suprapubic area). There is guarding. There is no rebound.   Musculoskeletal: Normal range of motion. She exhibits no edema or tenderness.   Neurological: She is alert and oriented to person, place, and time. She has normal reflexes.   Skin: Skin is warm. No rash noted. She is not diaphoretic.   Psychiatric: She has a normal mood and affect. Her behavior is normal. Judgment and thought content normal.   Nursing note and vitals reviewed.      ED Course     ED Course     Procedures               Critical Care time:  none               Results for orders placed or performed during the hospital encounter of 11/10/18 (from the past 24 hour(s))   Comprehensive metabolic panel   Result Value Ref Range    Sodium 142 133 - 144 mmol/L    Potassium 3.9 3.4 - 5.3 mmol/L    Chloride 107 94 - 109 mmol/L    Carbon Dioxide 27 20 - 32 mmol/L    Anion Gap 8 3 - 14 mmol/L    Glucose 110 (H) 70 - 99 mg/dL    Urea Nitrogen 11 7 - 30 mg/dL    Creatinine 0.88 0.52 - 1.04 mg/dL    GFR Estimate 66 >60 mL/min/1.7m2    GFR Estimate If Black 80 >60 mL/min/1.7m2    Calcium 8.8 8.5 - 10.1 mg/dL    Bilirubin Total  0.5 0.2 - 1.3 mg/dL    Albumin 3.9 3.4 - 5.0 g/dL    Protein Total 7.2 6.8 - 8.8 g/dL    Alkaline Phosphatase 63 40 - 150 U/L    ALT 17 0 - 50 U/L    AST 16 0 - 45 U/L   Lipase   Result Value Ref Range    Lipase 166 73 - 393 U/L   CBC with platelets differential   Result Value Ref Range    WBC 8.4 4.0 - 11.0 10e9/L    RBC Count 4.32 3.8 - 5.2 10e12/L    Hemoglobin 13.9 11.7 - 15.7 g/dL    Hematocrit 41.6 35.0 - 47.0 %    MCV 96 78 - 100 fl    MCH 32.2 26.5 - 33.0 pg    MCHC 33.4 31.5 - 36.5 g/dL    RDW 11.8 10.0 - 15.0 %    Platelet Count 228 150 - 450 10e9/L    Diff Method Automated Method     % Neutrophils 79.1 %    % Lymphocytes 11.0 %    % Monocytes 8.5 %    % Eosinophils 0.4 %    % Basophils 0.5 %    % Immature Granulocytes 0.5 %    Nucleated RBCs 0 0 /100    Absolute Neutrophil 6.7 1.6 - 8.3 10e9/L    Absolute Lymphocytes 0.9 0.8 - 5.3 10e9/L    Absolute Monocytes 0.7 0.0 - 1.3 10e9/L    Absolute Basophils 0.0 0.0 - 0.2 10e9/L    Abs Immature Granulocytes 0.0 0 - 0.4 10e9/L    Absolute Nucleated RBC 0.0    UA with Microscopic   Result Value Ref Range    Color Urine Yellow     Appearance Urine Slightly Cloudy     Glucose Urine Negative NEG^Negative mg/dL    Bilirubin Urine Negative NEG^Negative    Ketones Urine Negative NEG^Negative mg/dL    Specific Gravity Urine 1.009 1.003 - 1.035    Blood Urine Negative NEG^Negative    pH Urine 7.0 5.0 - 7.0 pH    Protein Albumin Urine Negative NEG^Negative mg/dL    Urobilinogen mg/dL 0.0 0.0 - 2.0 mg/dL    Nitrite Urine Negative NEG^Negative    Leukocyte Esterase Urine Negative NEG^Negative    Source Midstream Urine     WBC Urine 2 0 - 5 /HPF    RBC Urine <1 0 - 2 /HPF    Mucous Urine Present (A) NEG^Negative /LPF    Amorphous Crystals Few (A) NEG^Negative /HPF   CT Abdomen Pelvis w Contrast    Narrative    CT ABDOMEN AND PELVIS WITH CONTRAST   11/10/2018 1:04 PM     HISTORY: History of diverticulitis and now lower abdominal pain and  back pain.     TECHNIQUE:   60mL,  Isovue 370. Radiation dose for this scan was  reduced using automated exposure control, adjustment of the mA and/or  kV according to patient size, or iterative reconstruction technique.    COMPARISON: 10/21/2018    FINDINGS:   The lung bases are clear. A few tiny low-attenuation  lesions in the liver are unchanged. No new liver lesions are seen. The  spleen, pancreas, adrenal glands, and kidneys are unremarkable. There  is no evidence of bowel obstruction. There are postoperative changes  in the right lower quadrant. Appendix is not seen. There is sigmoid  diverticulosis. There is moderate wall thickening of the sigmoid  colon. There is a small amount of free fluid in the pelvis. There are  calcifications in the uterus which are likely in fibroids. There is no  evidence of abscess. No free intraperitoneal air.      Impression    IMPRESSION:   1. Moderate wall thickening of the sigmoid colon concerning for acute  sigmoid diverticulitis. No evidence of abscess.  2. Small amount of free fluid in the pelvis.  3. Stable low-attenuation hepatic lesions.    BENJAMIN SILVA MD       Medications   ketorolac (TORADOL) injection 30 mg (30 mg Intravenous Given 11/10/18 1203)   iopamidol (ISOVUE-370) solution 500 mL (60 mLs Intravenous Given 11/10/18 1245)   sodium chloride (PF) 0.9% PF flush 3 mL (3 mLs Intravenous Given 11/10/18 1245)   sodium chloride 0.9 % bag 100mL for CT scan flush use (60 mLs Intravenous Given 11/10/18 1245)       Assessments & Plan (with Medical Decision Making)  57-year-old female with some lower abdominal pain history of diverticulitis.  CT today shows some moderate thickening of the sigmoid colon with concern for diverticulitis.  No free air or abscess visualized.  She does not do well on floor quinolones due to tendinopathy type of pain.  She is prescribed Augmentin.  This is her seventh or eighth episode of this she states.  She was referred to surgery for consultation.       I have reviewed the  nursing notes.    I have reviewed the findings, diagnosis, plan and need for follow up with the patient.       Discharge Medication List as of 11/10/2018  2:01 PM      START taking these medications    Details   amoxicillin-clavulanate (AUGMENTIN) 875-125 MG per tablet Take 1 tablet by mouth 2 times daily for 10 days, Disp-20 tablet, R-0, E-Prescribe             Final diagnoses:   Diverticulitis of colon     This document serves as a record of services personally performed by Home Howe MD. It was created on their behalf by Mamie Rushing, a trained medical scribe. The creation of this record is based on the provider's personal observations and the statements of the patient. This document has been checked and approved by the attending provider.  Note: Chart documentation done in part with Dragon Voice Recognition software. Although reviewed after completion, some word and grammatical errors may remain.  11/10/2018   [facility] EMERGENCY DEPARTMENT     Home Howe MD  11/10/18 1522

## 2018-11-10 NOTE — ED AVS SNAPSHOT
[facility] Emergency Department    [address]     [place] [address]    Phone:  [phone]    Fax:  [phone]                                       Umm Claros   MRN: [id number]    Department:  [facility] Emergency Department   Date of Visit:  11/10/2018           Patient Information     Date Of Birth          1960        Your diagnoses for this visit were:     Diverticulitis of colon        You were seen by Home Howe MD.      Follow-up Information     Follow up with Charly Martinez MD.    Specialty:  General Surgery    Contact information:    [address]   [place] [address]  [phone]          Discharge Instructions         Diverticulitis    Some people get pouches along the wall of the colon as they get older. The pouches, called diverticuli, usually cause no symptoms. If the pouches become blocked, you can get an infection. This infection is called diverticulitis. It causes pain in your lower abdomen and fever. If not treated, it can become a serious condition, causing an abscess to form inside the pouch. The abscess may block the intestinal tract even or rupture, spreading infection throughout the abdomen.  When treatment is started early, oral antibiotics alone may be enough to cure diverticulitis. This method is tried first. But, if you don't improve or if your condition gets worse while using oral antibiotics, you may need to be admitted to the hospital for IV antibiotics. Severe cases may require surgery.  Home care  The following guidelines will help you care for yourself at home:    During the acute illness, rest and follow your healthcare provider's instructions about diet. Sometimes you will need to follow a clear liquid diet to rest your bowel. Once your symptoms are better, you may be told to follow a low-fiber diet for some time. Include foods like:  ? Flake cereal, mashed potatoes, pancakes, waffles, pasta, white bread, rice, applesauce,  bananas, eggs, fish, poultry, tofu, and cooked soft vegetables    Take antibiotics exactly as instructed. Don't miss any doses or stop taking the medication, even if you feel better.    Monitor your temperature and tell your healthcare provider if you have rising temperatures.  Preventing future attacks  Once you have an episode of diverticulitis, you are at risk for having it again. After you have recovered from this episode, you may be able to lower your risk by eating a high-fiber diet (20 gm/day to 35 gm/day of fiber). This cleans out the colon pouches that already exist and may prevent new ones from forming. Foods high in fiber include fresh fruits and edible peelings, raw or lightly cooked vegetables, whole grain cereals and breads, dried beans and peas, and bran.  Other steps that can help prevent future attacks include:    Take your medicines, such as antibiotics, as your healthcare provider says.    Drink 6 to 8 glasses of water every day, unless told otherwise.    Use a heating pad or hot water bottle to help abdominal cramping or pain.    Begin an exercise program. Ask your healthcare provider how to get started. You can benefit from simple activities such as walking or gardening.    Treat diarrhea with a bland diet. Start with liquids only; then slowly add fiber over time.    Watch for changes in your bowel movements (constipation to diarrhea). Avoid constipation by eating a high fiber diet and taking a stool softener if needed.    Get plenty of rest and sleep.  Follow-up care  Follow up with your healthcare provider as advised or sooner if you are not getting better in the next 2 days.  When to seek medical advice  Call your healthcare provider right away if any of these occur:    Fever of 100.4 F (38 C) or higher, or as directed by your healthcare provider    Repeated vomiting or swelling of the abdomen    Weakness, dizziness, light-headedness    Pain in your abdomen that gets worse, severe, or spreads  to your back    Pain that moves to the right lower abdomen    Rectal bleeding (stools that are red, black or maroon color)    Unexpected vaginal bleeding  Date Last Reviewed: 9/1/2016 2000-2018 [employer]. [address] [place] [address], [place] [address]. All rights reserved. This information is not intended as a substitute for professional medical care. Always follow your healthcare professional's instructions.      May use Ibuprofen up to 600 mg 4x/day as needed for pain    Your next 10 appointments already scheduled     Nov 15, 2018  8:30 AM CST   [facility] Visit with Charly Martinez MD   [facility]    [address] [address]   [phone]            Dec 11, 2018 12:00 PM CST   New Visit with Josh Carter MD   [facility] ([facility])    [address] [place] [address] [phone]              24 Hour Appointment Hotline       To make an appointment at any [facility], call [phone] ([phone]). If you don't have a family doctor or clinic, we will help you find one. [facility] are conveniently located to serve the needs of you and your family.             Review of your medicines      START taking        Dose / Directions Last dose taken    amoxicillin-clavulanate 875-125 MG per tablet   Commonly known as:  AUGMENTIN   Dose:  1 tablet   Quantity:  20 tablet        Take 1 tablet by mouth 2 times daily for 10 days   Refills:  0          Our records show that you are taking the medicines listed below. If these are incorrect, please call your family doctor or clinic.        Dose / Directions Last dose taken    ALPRAZolam 0.25 MG tablet   Commonly known as:  XANAX   Dose:  0.25-0.5 mg   Quantity:  20 tablet        Take 1-2 tablets (0.25-0.5 mg) by mouth as needed for anxiety   Refills:  0        ascorbic acid 500 MG tablet   Commonly known as:  VITAMIN C    Quantity:  0        1 TABLET TWICE DAILY   Refills:  0        aspirin 81 MG EC tablet   Dose:  81 mg        Take 81 mg by mouth daily.   Refills:  0        CALCIUM /VITAMIN D TABS   OR   Quantity:  0        1 TABLET DAILY   Refills:  0        docusate sodium 100 MG tablet   Commonly known as:  COLACE   Dose:  100 mg   Quantity:  60 tablet        Take 100 mg by mouth daily.   Refills:  1        fish oil-omega-3 fatty acids 1000 MG capsule   Dose:  1 g        Take 1 g by mouth daily.   Refills:  0        HYDROcodone-acetaminophen 5-325 MG per tablet   Commonly known as:  NORCO   Dose:  1 tablet   Quantity:  10 tablet        Take 1 tablet by mouth every 6 hours as needed for severe pain   Refills:  0        IBUPROFEN PO   Dose:  200 mg        Take 200 mg by mouth as needed.   Refills:  0        latanoprost 0.005 % ophthalmic solution   Commonly known as:  XALATAN        daily   Refills:  1        levothyroxine 88 MCG tablet   Commonly known as:  SYNTHROID/LEVOTHROID   Quantity:  90 tablet        TAKE ONE TABLET BY MOUTH EVERY MORNING   Refills:  3        magnesium 250 MG tablet   Dose:  1 tablet        Take 1 tablet by mouth daily.   Refills:  0        MILK OF MAGNESIA 400 MG/5ML suspension   Dose:  30-60 mL   Quantity:  360 mL   Generic drug:  magnesium hydroxide        Take 30-60 mLs by mouth daily as needed for constipation.   Refills:  1        Multiple vitamin Caps        daily   Refills:  0        phenazopyridine 200 MG tablet   Commonly known as:  PYRIDIUM   Dose:  200 mg   Quantity:  6 tablet        Take 1 tablet (200 mg) by mouth 3 times daily as needed   Refills:  0        TYLENOL PO        Refills:  0        vitamin E 400 UNIT capsule   Quantity:  30 capsule        Two times weekly   Refills:  0                Prescriptions were sent or printed at these locations (1 Prescription)                   [facility], [place] - [address]    [address] [place] [address]     Telephone:  [phone]   Fax:  [phone]   Hours:                  E-Prescribed (1 of 1)         amoxicillin-clavulanate (AUGMENTIN) 875-125 MG per tablet                Procedures and tests performed during your visit     CBC with platelets differential    CT Abdomen Pelvis w Contrast    Comprehensive metabolic panel    Give 20 ounces of water 15 minutes before CT of abdomen    Lipase    Peripheral IV catheter    UA with Microscopic    Urine Culture      Orders Needing Specimen Collection     None      Pending Results     Date and Time Order Name Status Description    11/10/2018 1142 Urine Culture In process             Pending Culture Results     Date and Time Order Name Status Description    11/10/2018 1142 Urine Culture In process             Pending Results Instructions     If you had any lab results that were not finalized at the time of your Discharge, you can call the ED Lab Result RN at [phone]. You will be contacted by this team for any positive Lab results or changes in treatment. The nurses are available 7 days a week from 10A to 6:30P.  You can leave a message 24 hours per day and they will return your call.        Thank you for choosing [facility]       Thank you for choosing [facility] for your care. Our goal is always to provide you with excellent care. Hearing back from our patients is one way we can continue to improve our services. Please take a few minutes to complete the written survey that you may receive in the mail after you visit with us. Thank you!        [employer] Information     [employer] gives you secure access to your electronic health record. If you see a primary care provider, you can also send messages to your care team and make appointments. If you have questions, please call your primary care clinic.  If you do not have a primary care provider, please call [phone] and they will assist you.        Care EveryWhere ID     This is your Care EveryWhere ID. This could be used by  other organizations to access your [facility] medical records  [id number]        Equal Access to Services     CHAYO NOONAN : Mike Schrader, jong stubbs, kenneth bonilla. So [facility] [phone].    ATENCIÓN: Si habla español, tiene a newman disposición servicios gratuitos de asistencia lingüística. Llame al [phone].    We comply with applicable federal civil rights laws and [place] laws. We do not discriminate on the basis of race, color, national origin, age, disability, sex, sexual orientation, or gender identity.            After Visit Summary       This is your record. Keep this with you and show to your community pharmacist(s) and doctor(s) at your next visit.

## 2018-11-10 NOTE — DISCHARGE INSTRUCTIONS
Diverticulitis    Some people get pouches along the wall of the colon as they get older. The pouches, called diverticuli, usually cause no symptoms. If the pouches become blocked, you can get an infection. This infection is called diverticulitis. It causes pain in your lower abdomen and fever. If not treated, it can become a serious condition, causing an abscess to form inside the pouch. The abscess may block the intestinal tract even or rupture, spreading infection throughout the abdomen.  When treatment is started early, oral antibiotics alone may be enough to cure diverticulitis. This method is tried first. But, if you don't improve or if your condition gets worse while using oral antibiotics, you may need to be admitted to the hospital for IV antibiotics. Severe cases may require surgery.  Home care  The following guidelines will help you care for yourself at home:    During the acute illness, rest and follow your healthcare provider's instructions about diet. Sometimes you will need to follow a clear liquid diet to rest your bowel. Once your symptoms are better, you may be told to follow a low-fiber diet for some time. Include foods like:  ? Flake cereal, mashed potatoes, pancakes, waffles, pasta, white bread, rice, applesauce, bananas, eggs, fish, poultry, tofu, and cooked soft vegetables    Take antibiotics exactly as instructed. Don't miss any doses or stop taking the medication, even if you feel better.    Monitor your temperature and tell your healthcare provider if you have rising temperatures.  Preventing future attacks  Once you have an episode of diverticulitis, you are at risk for having it again. After you have recovered from this episode, you may be able to lower your risk by eating a high-fiber diet (20 gm/day to 35 gm/day of fiber). This cleans out the colon pouches that already exist and may prevent new ones from forming. Foods high in fiber include fresh fruits and edible peelings, raw or  lightly cooked vegetables, whole grain cereals and breads, dried beans and peas, and bran.  Other steps that can help prevent future attacks include:    Take your medicines, such as antibiotics, as your healthcare provider says.    Drink 6 to 8 glasses of water every day, unless told otherwise.    Use a heating pad or hot water bottle to help abdominal cramping or pain.    Begin an exercise program. Ask your healthcare provider how to get started. You can benefit from simple activities such as walking or gardening.    Treat diarrhea with a bland diet. Start with liquids only; then slowly add fiber over time.    Watch for changes in your bowel movements (constipation to diarrhea). Avoid constipation by eating a high fiber diet and taking a stool softener if needed.    Get plenty of rest and sleep.  Follow-up care  Follow up with your healthcare provider as advised or sooner if you are not getting better in the next 2 days.  When to seek medical advice  Call your healthcare provider right away if any of these occur:    Fever of 100.4 F (38 C) or higher, or as directed by your healthcare provider    Repeated vomiting or swelling of the abdomen    Weakness, dizziness, light-headedness    Pain in your abdomen that gets worse, severe, or spreads to your back    Pain that moves to the right lower abdomen    Rectal bleeding (stools that are red, black or maroon color)    Unexpected vaginal bleeding  Date Last Reviewed: 9/1/2016 2000-2018 [employer]. [address] [place] [address]. All rights reserved. This information is not intended as a substitute for professional medical care. Always follow your healthcare professional's instructions.      May use Ibuprofen up to 600 mg 4x/day as needed for pain

## 2018-11-10 NOTE — ED AVS SNAPSHOT
[facility]    [address] [place] [address]    Phone:  [phone]    Fax:  [phone]                                       Umm Claros   MRN: [id number]    Department:  [facility]   Date of Visit:  11/10/2018           After Visit Summary Signature Page     I have received my discharge instructions, and my questions have been answered. I have discussed any challenges I see with this plan with the nurse or doctor.    ..........................................................................................................................................  Patient/Patient Representative Signature      ..........................................................................................................................................  Patient Representative Print Name and Relationship to Patient    ..................................................               ................................................  Date                                   Time    ..........................................................................................................................................  Reviewed by Signature/Title    ...................................................              ..............................................  Date                                               Time          22EPIC Rev 08/18

## 2018-11-11 LAB
BACTERIA SPEC CULT: NO GROWTH
Lab: NORMAL
SPECIMEN SOURCE: NORMAL

## 2018-11-12 ENCOUNTER — TELEPHONE (OUTPATIENT)
Dept: ALLERGY | Facility: CLINIC | Age: 58
End: 2018-11-12

## 2018-11-12 NOTE — TELEPHONE ENCOUNTER
RN left message for patient to return call to RN's direct line @ [phone] regarding result note from provider:    Cesar Tucker DO [facility] Patient Care Pool                   Good morning. The celiac testing returned as normal. Thanks.         Kelly Figueroa RN

## 2018-11-13 NOTE — TELEPHONE ENCOUNTER
Per chart review, patient has read [employer] message. Closing encounter.       Kelly Figueroa RN

## 2018-11-15 ENCOUNTER — OFFICE VISIT (OUTPATIENT)
Dept: SURGERY | Facility: CLINIC | Age: 58
End: 2018-11-15
Payer: COMMERCIAL

## 2018-11-15 ENCOUNTER — TELEPHONE (OUTPATIENT)
Dept: FAMILY MEDICINE | Facility: OTHER | Age: 58
End: 2018-11-15

## 2018-11-15 VITALS
BODY MASS INDEX: 20.42 KG/M2 | HEART RATE: 90 BPM | WEIGHT: 115.3 LBS | OXYGEN SATURATION: 100 % | SYSTOLIC BLOOD PRESSURE: 142 MMHG | TEMPERATURE: 98.1 F | DIASTOLIC BLOOD PRESSURE: 70 MMHG

## 2018-11-15 DIAGNOSIS — K57.30 DIVERTICULAR DISEASE OF LARGE INTESTINE WITH COMPLICATION: Primary | ICD-10-CM

## 2018-11-15 PROCEDURE — 99244 OFF/OP CNSLTJ NEW/EST MOD 40: CPT | Performed by: SPECIALIST

## 2018-11-15 NOTE — MR AVS SNAPSHOT
After Visit Summary   11/15/2018    Umm Claros    MRN: [id number]           Patient Information     Date Of Birth          1960        Visit Information        Provider Department      11/15/2018 8:30 AM Charly Martinez MD [facility]         Follow-ups after your visit        Your next 10 appointments already scheduled     Dec 11, 2018 12:00 PM CST   New Visit with Josh Carter MD   [facility]    [address] [place] [address] [phone]              Who to contact     If you have questions or need follow up information about today's clinic visit or your schedule please contact [facility] directly at [phone].  Normal or non-critical lab and imaging results will be communicated to you by MyChart, letter or phone within 4 business days after the clinic has received the results. If you do not hear from us within 7 days, please contact the clinic through MyChart or phone. If you have a critical or abnormal lab result, we will notify you by phone as soon as possible.  Submit refill requests through [employer] or call your pharmacy and they will forward the refill request to us. Please allow 3 business days for your refill to be completed.          Additional Information About Your Visit        MyChart Information     [employer] gives you secure access to your electronic health record. If you see a primary care provider, you can also send messages to your care team and make appointments. If you have questions, please call your primary care clinic.  If you do not have a primary care provider, please call [phone] and they will assist you.        Care EveryWhere ID     This is your Care EveryWhere ID. This could be used by other organizations to access your [place] medical records  [id number]        Your Vitals Were     Pulse Temperature Last Period Pulse Oximetry BMI (Body Mass  Index)       90 98.1  F (36.7  C) (Temporal) 04/13/2001 100% 20.42 kg/m2        Blood Pressure from Last 3 Encounters:   11/15/18 142/70   11/10/18 108/72   11/06/18 112/70    Weight from Last 3 Encounters:   11/15/18 52.3 kg (115 lb 4.8 oz)   11/06/18 51.7 kg (114 lb)   10/30/18 51.7 kg (114 lb)              Today, you had the following     No orders found for display       Primary Care Provider Office Phone # Fax #    Lindsay Padgett [phone]       [address]  [facility] [address]        Equal Access to Services     CHAYO NOONAN : Mike Schrader, wasteven stubbs, qaybta kaalmada elfego, kenneth ma . So [facility] [phone].    ATENCIÓN: Si habla español, tiene a newman disposición servicios gratuitos de asistencia lingüística. [facility] [phone].    We comply with applicable federal civil rights laws and [place] laws. We do not discriminate on the basis of race, color, national origin, age, disability, sex, sexual orientation, or gender identity.            Thank you!     Thank you for choosing [facility]  for your care. Our goal is always to provide you with excellent care. Hearing back from our patients is one way we can continue to improve our services. Please take a few minutes to complete the written survey that you may receive in the mail after your visit with us. Thank you!             Your Updated Medication List - Protect others around you: Learn how to safely use, store and throw away your medicines at www.disposemymeds.org.          This list is accurate as of 11/15/18  8:44 AM.  Always use your most recent med list.                   Brand Name Dispense Instructions for use Diagnosis    ALPRAZolam 0.25 MG tablet    XANAX    20 tablet    Take 1-2 tablets (0.25-0.5 mg) by mouth as needed for anxiety    Anxiety       amoxicillin-clavulanate 875-125 MG per tablet    AUGMENTIN    20 tablet    Take 1 tablet by mouth  2 times daily for 10 days        ascorbic acid 500 MG tablet    VITAMIN C    0    1 TABLET TWICE DAILY        aspirin 81 MG EC tablet      Take 81 mg by mouth daily.        CALCIUM /VITAMIN D TABS   OR     0    1 TABLET DAILY        docusate sodium 100 MG tablet    COLACE    60 tablet    Take 100 mg by mouth daily.    LLQ abdominal pain, Diverticulosis of colon       fish oil-omega-3 fatty acids 1000 MG capsule      Take 1 g by mouth daily.        HYDROcodone-acetaminophen 5-325 MG per tablet    NORCO    10 tablet    Take 1 tablet by mouth every 6 hours as needed for severe pain        IBUPROFEN PO      Take 200 mg by mouth as needed.        latanoprost 0.005 % ophthalmic solution    XALATAN     daily        levothyroxine 88 MCG tablet    SYNTHROID/LEVOTHROID    90 tablet    TAKE ONE TABLET BY MOUTH EVERY MORNING    Hypothyroidism, unspecified type       magnesium 250 MG tablet      Take 1 tablet by mouth daily.        MILK OF MAGNESIA 400 MG/5ML suspension   Generic drug:  magnesium hydroxide     360 mL    Take 30-60 mLs by mouth daily as needed for constipation.    LLQ abdominal pain, Diverticulosis of colon       Multiple vitamin Caps      daily        phenazopyridine 200 MG tablet    PYRIDIUM    6 tablet    Take 1 tablet (200 mg) by mouth 3 times daily as needed    Dysuria       TYLENOL PO           vitamin E 400 UNIT capsule     30 capsule    Two times weekly

## 2018-11-15 NOTE — LETTER
11/15/2018         RE: Umm Claros  [address] [facility] [address]        Dear Colleague,    Thank you for referring your patient, Umm Claros, to the [facility]. Please see a copy of my visit note below.    Consult requested by Dr. Howe    Reason for consultation - diverticulitis      HPI:  Pt is a 57-year-old white female presenting with a 7-year history of multiple attacks (6-7) of diverticulitis.  Her first documented one was in 2011 but she thinks she has had several that predated that.  She states it started after she developed constipation after her diagnosis of hypothyroidism when she was placed on Synthroid.  Her last colonoscopy was in 2012 and just revealed some diverticulosis.  Her most recent attack was about a month ago for which she is still on Augmentin.  Feels it is slowly improving.  She is concerned to see her attacks are becoming more severe and more frequent.  She denies any current nausea vomiting fevers or chills.  He still has a dull ache in the left lower quadrant but is only been on antibiotics for 5 days.  She is now seeking a surgical opinion.  She also has a second surgical opinion to scheduled at the [facility] as well as a opinion with her gastroenterologist.  She now presents for evaluation of her multiple attacks of diverticulitis.    Past Medical History:   Diagnosis Date     Anxiety state, unspecified      Depressive disorder, not elsewhere classified     depression     Raynaud's syndrome      Tobacco use disorder      Unspecified hypothyroidism      Past Surgical History:   Procedure Laterality Date     C APPENDECTOMY  05/26/00    Laparoscopic drainage of left ovarian cyst and lysis of adhesions, dilatation and curettage, and laparoscopic appendectomy performed by Dr. Ryan     COLONOSCOPY  4/10/2012    Procedure:COLONOSCOPY; Colonoscopy; Surgeon:BELL LAYTON; Location: GI      REMOVAL OF OVARY/TUBE(S)  01/30/01    Laparoscopic left  sided salpingo-oophorectomy. Cystoscopy     HC REMOVE TONSILS/ADENOIDS,<13 Y/O      T & A <12y.o.     Current Outpatient Prescriptions   Medication     amoxicillin-clavulanate (AUGMENTIN) 875-125 MG per tablet     Acetaminophen (TYLENOL PO)     ALPRAZolam (XANAX) 0.25 MG tablet     aspirin 81 MG EC tablet     CALCIUM /VITAMIN D TABS   OR     docusate sodium 100 MG tablet     fish oil-omega-3 fatty acids (FISH OIL) 1000 MG capsule     HYDROcodone-acetaminophen (NORCO) 5-325 MG per tablet     IBUPROFEN PO     latanoprost (XALATAN) 0.005 % ophthalmic solution     levothyroxine (SYNTHROID/LEVOTHROID) 88 MCG tablet     Magnesium 250 MG tablet     magnesium hydroxide (MILK OF MAGNESIA) 400 MG/5ML suspension     MULTIPLE VITAMIN CAPS   OR     phenazopyridine (PYRIDIUM) 200 MG tablet     VITAMIN C TABS 500 MG OR     vitamin E 400 UNIT capsule     No current facility-administered medications for this visit.         Allergies   Allergen Reactions     No Known Allergies      Tramadol      Other reaction(s): Other - Describe In Comment Field  Dizzy and double vision     Social History   Substance Use Topics     Smoking status: Former Smoker     Years: 7.00     Quit date: 4/1/1993     Smokeless tobacco: Never Used     Alcohol use Yes      Comment: 3-5 drinks per week     Family History   Problem Relation Age of Onset     Cancer Paternal Grandmother      breast cancer     Gynecology Mother      endometriosis     Cancer Father      prostate cancer     Alcohol/Drug Father      alcohol problems and smoker      ROS: 10 point ROS neg other than the symptoms noted above in the HPI.    PE:  B/P: 142/70, T: 98.1, P: 90, R: Data Unavailable  General: well developed, well nourished thin WF who appears their stated age  HEENT: NC/AT, EOMI, (-)icterus, (-)injection  Neck: Supple, No JVD  Chest: CTA  Heart: S1, S2, (-)m/r/g  Abd: Soft, non distended, minimal LLQ tenderness to deep palpation  Ext; Warm, no edema  Psych: AAOx3  Neuro: No focal  deficits    Lab Results   Component Value Date    WBC 8.4 11/10/2018     Lab Results   Component Value Date    RBC 4.32 11/10/2018     Lab Results   Component Value Date    HGB 13.9 11/10/2018     Lab Results   Component Value Date    HCT 41.6 11/10/2018     No components found for: MCT  Lab Results   Component Value Date    MCV 96 11/10/2018     Lab Results   Component Value Date    MCH 32.2 11/10/2018     Lab Results   Component Value Date    MCHC 33.4 11/10/2018     Lab Results   Component Value Date    RDW 11.8 11/10/2018     Lab Results   Component Value Date     11/10/2018       CT -   CT ABDOMEN AND PELVIS WITH CONTRAST   11/10/2018 1:04 PM      HISTORY: History of diverticulitis and now lower abdominal pain and  back pain.      TECHNIQUE:   60mL, Isovue 370. Radiation dose for this scan was  reduced using automated exposure control, adjustment of the mA and/or  kV according to patient size, or iterative reconstruction technique.     COMPARISON: 10/21/2018     FINDINGS:   The lung bases are clear. A few tiny low-attenuation  lesions in the liver are unchanged. No new liver lesions are seen. The  spleen, pancreas, adrenal glands, and kidneys are unremarkable. There  is no evidence of bowel obstruction. There are postoperative changes  in the right lower quadrant. Appendix is not seen. There is sigmoid  diverticulosis. There is moderate wall thickening of the sigmoid  colon. There is a small amount of free fluid in the pelvis. There are  calcifications in the uterus which are likely in fibroids. There is no  evidence of abscess. No free intraperitoneal air.         IMPRESSION:   1. Moderate wall thickening of the sigmoid colon concerning for acute  sigmoid diverticulitis. No evidence of abscess.  2. Small amount of free fluid in the pelvis.  3. Stable low-attenuation hepatic lesions.     BENJAMIN SILVA MD    Colonoscopy from 2012 reviewed.    Impression/plan:  This is a 57-year-old lady with multiple  attacks of diverticulitis that become less responsive to medical therapy.  I feel she would benefit from a resection and she is considering it.  I discussed open versus laparoscopic sigmoid resection along with the need for preoperative colonoscopy.  She expressed understanding.  Procedures, risks, benefits alternatives were discussed and again she expressed understanding.  All of her questions were answered.  I encouraged her to pursue her second opinions to ensure all her questions were answered to her satisfaction.  She will contact me if she wishes to have the procedure done in [place].    Patient to follow up with Primary Care provider regarding elevated blood pressure.    Charly Martinez MD, FACS    Again, thank you for allowing me to participate in the care of your patient.        Sincerely,        Charly Martinez MD

## 2018-11-15 NOTE — NURSING NOTE
"Umm Claros is a 57 year old female who presents for:  Chief Complaint   Patient presents with     RECHECK     Diverticulitis of colon     Consult     referring Shyam        Initial Vitals:  /70 (BP Location: Right arm, Patient Position: Sitting, Cuff Size: Adult Large)  Pulse 90  Temp 98.1  F (36.7  C) (Temporal)  Wt 52.3 kg (115 lb 4.8 oz)  LMP 04/13/2001  SpO2 100%  BMI 20.42 kg/m2 Estimated body mass index is 20.42 kg/(m^2) as calculated from the following:    Height as of 11/6/18: 1.6 m (5' 3\").    Weight as of this encounter: 52.3 kg (115 lb 4.8 oz).. Body surface area is 1.52 meters squared. BP completed using cuff size: large  Data Unavailable    Do you feel safe in your environment?  Yes  Do you need any refills today? No    Nursing Comments:         Genesis Yao  "

## 2018-11-15 NOTE — TELEPHONE ENCOUNTER
I think this was suggested by .     I think doing a colonoscopy while she is having an acute diverituclitis has increased risk of perforation.      I do not think I can take that call and would go with recommendations from the surgeon to see if that would help them make a plan for the surgery.  Please contact the [facility] or    to see if they would prefer to have it done before she could see them or wait until she be seen there

## 2018-11-15 NOTE — TELEPHONE ENCOUNTER
Reason for Call:  Other call back    Detailed comments: Patient was seen in the ED again last weekend for her diverticulitis and she did meet with Dr Martinez and he agrees she needing to see GI and have surgery.  She thought about after meeting with him about that she needs to stay on antibiotics until she can meet with the surgeon and get it scheduled.  She is scheduled down at the  next month for a consult that is first she could get in.  She is also wondering if you think she should get a colonoscopy before being seen at the .  Please call she wants a plan.      Pharmacy- [facility]   Phone Number Patient can be reached at: Home number on file [phone] (home)    Best Time: any    Can we leave a detailed message on this number? YES    Call taken on 11/15/2018 at 1:06 PM by Cristina Moy

## 2018-11-15 NOTE — PROGRESS NOTES
Consult requested by Dr. Howe    Reason for consultation - diverticulitis      HPI:  Pt is a 57-year-old white female presenting with a 7-year history of multiple attacks (6-7) of diverticulitis.  Her first documented one was in 2011 but she thinks she has had several that predated that.  She states it started after she developed constipation after her diagnosis of hypothyroidism when she was placed on Synthroid.  Her last colonoscopy was in 2012 and just revealed some diverticulosis.  Her most recent attack was about a month ago for which she is still on Augmentin.  Feels it is slowly improving.  She is concerned to see her attacks are becoming more severe and more frequent.  She denies any current nausea vomiting fevers or chills.  He still has a dull ache in the left lower quadrant but is only been on antibiotics for 5 days.  She is now seeking a surgical opinion.  She also has a second surgical opinion to scheduled at the [facility] as well as a opinion with her gastroenterologist.  She now presents for evaluation of her multiple attacks of diverticulitis.    Past Medical History:   Diagnosis Date     Anxiety state, unspecified      Depressive disorder, not elsewhere classified     depression     Raynaud's syndrome      Tobacco use disorder      Unspecified hypothyroidism      Past Surgical History:   Procedure Laterality Date     C APPENDECTOMY  05/26/00    Laparoscopic drainage of left ovarian cyst and lysis of adhesions, dilatation and curettage, and laparoscopic appendectomy performed by Dr. Ryan     COLONOSCOPY  4/10/2012    Procedure:COLONOSCOPY; Colonoscopy; Surgeon:BELL LAYTON; Location: GI     HC REMOVAL OF OVARY/TUBE(S)  01/30/01    Laparoscopic left sided salpingo-oophorectomy. Cystoscopy     HC REMOVE TONSILS/ADENOIDS,<11 Y/O      T & A <12y.o.     Current Outpatient Prescriptions   Medication     amoxicillin-clavulanate (AUGMENTIN) 875-125 MG per tablet     Acetaminophen (TYLENOL PO)      ALPRAZolam (XANAX) 0.25 MG tablet     aspirin 81 MG EC tablet     CALCIUM /VITAMIN D TABS   OR     docusate sodium 100 MG tablet     fish oil-omega-3 fatty acids (FISH OIL) 1000 MG capsule     HYDROcodone-acetaminophen (NORCO) 5-325 MG per tablet     IBUPROFEN PO     latanoprost (XALATAN) 0.005 % ophthalmic solution     levothyroxine (SYNTHROID/LEVOTHROID) 88 MCG tablet     Magnesium 250 MG tablet     magnesium hydroxide (MILK OF MAGNESIA) 400 MG/5ML suspension     MULTIPLE VITAMIN CAPS   OR     phenazopyridine (PYRIDIUM) 200 MG tablet     VITAMIN C TABS 500 MG OR     vitamin E 400 UNIT capsule     No current facility-administered medications for this visit.         Allergies   Allergen Reactions     No Known Allergies      Tramadol      Other reaction(s): Other - Describe In Comment Field  Dizzy and double vision     Social History   Substance Use Topics     Smoking status: Former Smoker     Years: 7.00     Quit date: 4/1/1993     Smokeless tobacco: Never Used     Alcohol use Yes      Comment: 3-5 drinks per week     Family History   Problem Relation Age of Onset     Cancer Paternal Grandmother      breast cancer     Gynecology Mother      endometriosis     Cancer Father      prostate cancer     Alcohol/Drug Father      alcohol problems and smoker      ROS: 10 point ROS neg other than the symptoms noted above in the HPI.    PE:  B/P: 142/70, T: 98.1, P: 90, R: Data Unavailable  General: well developed, well nourished thin WF who appears their stated age  HEENT: NC/AT, EOMI, (-)icterus, (-)injection  Neck: Supple, No JVD  Chest: CTA  Heart: S1, S2, (-)m/r/g  Abd: Soft, non distended, minimal LLQ tenderness to deep palpation  Ext; Warm, no edema  Psych: AAOx3  Neuro: No focal deficits    Lab Results   Component Value Date    WBC 8.4 11/10/2018     Lab Results   Component Value Date    RBC 4.32 11/10/2018     Lab Results   Component Value Date    HGB 13.9 11/10/2018     Lab Results   Component Value Date    HCT 41.6  11/10/2018     No components found for: MCT  Lab Results   Component Value Date    MCV 96 11/10/2018     Lab Results   Component Value Date    MCH 32.2 11/10/2018     Lab Results   Component Value Date    MCHC 33.4 11/10/2018     Lab Results   Component Value Date    RDW 11.8 11/10/2018     Lab Results   Component Value Date     11/10/2018       CT -   CT ABDOMEN AND PELVIS WITH CONTRAST   11/10/2018 1:04 PM      HISTORY: History of diverticulitis and now lower abdominal pain and  back pain.      TECHNIQUE:   60mL, Isovue 370. Radiation dose for this scan was  reduced using automated exposure control, adjustment of the mA and/or  kV according to patient size, or iterative reconstruction technique.     COMPARISON: 10/21/2018     FINDINGS:   The lung bases are clear. A few tiny low-attenuation  lesions in the liver are unchanged. No new liver lesions are seen. The  spleen, pancreas, adrenal glands, and kidneys are unremarkable. There  is no evidence of bowel obstruction. There are postoperative changes  in the right lower quadrant. Appendix is not seen. There is sigmoid  diverticulosis. There is moderate wall thickening of the sigmoid  colon. There is a small amount of free fluid in the pelvis. There are  calcifications in the uterus which are likely in fibroids. There is no  evidence of abscess. No free intraperitoneal air.         IMPRESSION:   1. Moderate wall thickening of the sigmoid colon concerning for acute  sigmoid diverticulitis. No evidence of abscess.  2. Small amount of free fluid in the pelvis.  3. Stable low-attenuation hepatic lesions.     BENJAMIN SILVA MD    Colonoscopy from 2012 reviewed.    Impression/plan:  This is a 57-year-old lady with multiple attacks of diverticulitis that become less responsive to medical therapy.  I feel she would benefit from a resection and she is considering it.  I discussed open versus laparoscopic sigmoid resection along with the need for preoperative  colonoscopy.  She expressed understanding.  Procedures, risks, benefits alternatives were discussed and again she expressed understanding.  All of her questions were answered.  I encouraged her to pursue her second opinions to ensure all her questions were answered to her satisfaction.  She will contact me if she wishes to have the procedure done in [place].    Patient to follow up with Primary Care provider regarding elevated blood pressure.    Charly Martinez MD, FACS

## 2018-11-16 ENCOUNTER — MYC MEDICAL ADVICE (OUTPATIENT)
Dept: FAMILY MEDICINE | Facility: OTHER | Age: 58
End: 2018-11-16

## 2018-11-19 ENCOUNTER — E-VISIT (OUTPATIENT)
Dept: FAMILY MEDICINE | Facility: OTHER | Age: 58
End: 2018-11-19
Payer: COMMERCIAL

## 2018-11-19 DIAGNOSIS — K57.32 DIVERTICULITIS OF COLON: Primary | ICD-10-CM

## 2018-11-19 PROCEDURE — 99444 ZZC PHYSICIAN ONLINE EVALUATION & MANAGEMENT SERVICE: CPT | Performed by: FAMILY MEDICINE

## 2018-12-06 ENCOUNTER — TELEPHONE (OUTPATIENT)
Dept: GASTROENTEROLOGY | Facility: CLINIC | Age: 58
End: 2018-12-06

## 2018-12-06 NOTE — TELEPHONE ENCOUNTER
PREVISIT INFORMATION                                                    Umm Claros scheduled for future visit at [facility] specialty clinics.    Patient is scheduled to see Dr. Carter on 12/11/18  Reason for visit: Diverticulitis  Referring provider Cesar Tucker  Has patient seen previous specialist?   Medical Records:  In chart    REVIEW                                                      New patient packet mailed to patient:   Medication reconciliation complete:       Current Outpatient Prescriptions   Medication Sig Dispense Refill     Acetaminophen (TYLENOL PO)        ALPRAZolam (XANAX) 0.25 MG tablet Take 1-2 tablets (0.25-0.5 mg) by mouth as needed for anxiety 20 tablet 0     aspirin 81 MG EC tablet Take 81 mg by mouth daily.       CALCIUM /VITAMIN D TABS   OR 1 TABLET DAILY 0 0     docusate sodium 100 MG tablet Take 100 mg by mouth daily. 60 tablet 1     fish oil-omega-3 fatty acids (FISH OIL) 1000 MG capsule Take 1 g by mouth daily.       HYDROcodone-acetaminophen (NORCO) 5-325 MG per tablet Take 1 tablet by mouth every 6 hours as needed for severe pain 10 tablet 0     IBUPROFEN PO Take 200 mg by mouth as needed.       latanoprost (XALATAN) 0.005 % ophthalmic solution daily  1     levothyroxine (SYNTHROID/LEVOTHROID) 88 MCG tablet TAKE ONE TABLET BY MOUTH EVERY MORNING 90 tablet 3     Magnesium 250 MG tablet Take 1 tablet by mouth daily.       magnesium hydroxide (MILK OF MAGNESIA) 400 MG/5ML suspension Take 30-60 mLs by mouth daily as needed for constipation. 360 mL 1     MULTIPLE VITAMIN CAPS   OR daily  0     phenazopyridine (PYRIDIUM) 200 MG tablet Take 1 tablet (200 mg) by mouth 3 times daily as needed 6 tablet 0     VITAMIN C TABS 500 MG OR 1 TABLET TWICE DAILY 0 0     vitamin E 400 UNIT capsule Two times weekly 30 capsule 0       Allergies: No known allergies and Tramadol        PLAN/FOLLOW-UP NEEDED                                                      Nurse called and  left message on  with date/time of upcoming appointment, along with number to return call if additional records need to be obtain or appointment needs to be cancelled/rescheduled.    Patient Reminders Given:  Please, make sure you bring an updated list of your medications.   If you are having a procedure, please, present 15 minutes early.  If you need to cancel or reschedule,please call [phone].    Wendy Maddox

## 2018-12-09 ASSESSMENT — ENCOUNTER SYMPTOMS
JAUNDICE: 0
DIARRHEA: 0
BOWEL INCONTINENCE: 0
BLOATING: 0
DECREASED LIBIDO: 1
RECTAL PAIN: 0
NAUSEA: 0
ABDOMINAL PAIN: 1
BLOOD IN STOOL: 0
VOMITING: 0
CONSTIPATION: 0
HEARTBURN: 0
HOT FLASHES: 1

## 2018-12-11 ENCOUNTER — OFFICE VISIT (OUTPATIENT)
Dept: GASTROENTEROLOGY | Facility: CLINIC | Age: 58
End: 2018-12-11
Attending: ALLERGY & IMMUNOLOGY
Payer: COMMERCIAL

## 2018-12-11 VITALS
HEART RATE: 90 BPM | BODY MASS INDEX: 20.04 KG/M2 | RESPIRATION RATE: 16 BRPM | SYSTOLIC BLOOD PRESSURE: 106 MMHG | HEIGHT: 63 IN | DIASTOLIC BLOOD PRESSURE: 77 MMHG | TEMPERATURE: 97.5 F | WEIGHT: 113.1 LBS | OXYGEN SATURATION: 98 %

## 2018-12-11 DIAGNOSIS — K57.32 DIVERTICULITIS OF COLON: Primary | ICD-10-CM

## 2018-12-11 DIAGNOSIS — R10.32 ABDOMINAL PAIN, LEFT LOWER QUADRANT: ICD-10-CM

## 2018-12-11 PROCEDURE — 99204 OFFICE O/P NEW MOD 45 MIN: CPT | Performed by: INTERNAL MEDICINE

## 2018-12-11 ASSESSMENT — MIFFLIN-ST. JEOR: SCORE: 1062.15

## 2018-12-11 NOTE — PATIENT INSTRUCTIONS
Meet with your colorectal surgeon regarding options    Schedule colonoscopy with Dr. Carter at [place] with Anesthesia in 1 month unless your colorectal surgeon recommends to do the procedure with them at the [facility].    Start miralax 1 scoop per day and fiber 1 scoop per day

## 2018-12-11 NOTE — PROGRESS NOTES
GASTROENTEROLOGY NEW PATIENT CLINIC VISIT    CC/REFERRING MD:    Lindsay Padgett    REASON FOR CONSULTATION:   Cesar Tucker for   Chief Complaint   Patient presents with     Appointment     ongoing diverticulitis     HISTORY OF PRESENT ILLNESS:    Umm Claros is 58 year old female who presents for evaluation of recurrent diverticulitis.  She notes that she has had several episodes of diverticulitis over the past several years.  She notes that she previously was treated with 6 episodes of diverticulitis with ciprofloxacin and Flagyl until she developed tendon issues.  She reports that she was then later prescribed Augmentin more recently.  She had one episode of diverticulitis in 2017, then reports in 2018 she had diverticulitis in July which was successfully treated with antibiotics.  She then developed diverticulitis in October and received 10 days of antibiotics and symptoms did not improve so in November 2018 she again was prescribed Augmentin for 15 days and now symptoms have improved.  She did go to the ED in October as well as November 2018 and CT imaging did note changes in the region of the sigmoid colon consistent with acute diverticulitis.  She notes that her pain typically is left-sided and lower abdominal pain and also has back pain along with the episodes.  She does not get fever with these episodes and reports that she typically does not get any elevated white blood cell count.  She does note that she often has improvement of her symptoms when she is able to have bowel movements.  She notes that she has felt other times when she is developing diverticulitis type symptoms, takes mag citrate and if she is able to have a bowel movement the symptoms are improved.  She notes that there are certain foods which were to worsen her symptoms such as beef, pork, pizza.  She notes that she is now 22 days since being off antibiotics for the last episode of diverticulitis.  She is  now having a daily bowel movement and is feeling well.  She does note that she gets more symptoms during periods of increased stress.  She is currently using Colace as well as probiotics but is not on any other medication therapy.  She reports that she is considering undergoing surgical resection for the area of sigmoid diverticulitis.  Last colonoscopy was in 2012 and was noted to have sigmoid diverticulosis with some evidence of diverticulitis at that time.  There is no family history of colon cancer or inflammatory bowel disease.  She does not take frequent NSAIDs.      PROBLEM LIST  Patient Active Problem List    Diagnosis Date Noted     Abdominal cramping 11/06/2018     Priority: Medium     Vaginal atrophy 02/26/2018     Priority: Medium     De Quervain's disease (tenosynovitis) 11/06/2017     Priority: Medium     Advance Care Planning 07/31/2017     Priority: Medium     Diverticulitis of colon 02/23/2017     Priority: Medium     Chronic constipation 03/14/2016     Priority: Medium     Osteopenia of prematurity 11/09/2012     Priority: Medium     Intradermal nevus 08/08/2011     Priority: Medium     right side of neck       Hypothyroidism 10/03/2005     Priority: Medium       PERTINENT PAST MEDICAL HISTORY:  (I personally reviewed this history with the patient at today's visit)   Past Medical History:   Diagnosis Date     Anxiety state, unspecified      Depressive disorder, not elsewhere classified     depression     Raynaud's syndrome      Tobacco use disorder      Unspecified hypothyroidism          PREVIOUS SURGERIES: (I personally reviewed this history with the patient at today's visit)   Past Surgical History:   Procedure Laterality Date     C APPENDECTOMY  05/26/00    Laparoscopic drainage of left ovarian cyst and lysis of adhesions, dilatation and curettage, and laparoscopic appendectomy performed by Dr. Ryan     COLONOSCOPY  4/10/2012    Procedure:COLONOSCOPY; Colonoscopy; Surgeon:BELL LAYTON;  Location:[facility]     HC REMOVAL OF OVARY/TUBE(S)  01/30/01    Laparoscopic left sided salpingo-oophorectomy. Cystoscopy     HC REMOVE TONSILS/ADENOIDS,<11 Y/O      T & A <12y.o.         ALLERGIES:     Allergies   Allergen Reactions     No Known Allergies      Tramadol      Other reaction(s): Other - Describe In Comment Field  Dizzy and double vision       PERTINENT MEDICATIONS:    Current Outpatient Medications:      Acetaminophen (TYLENOL PO), , Disp: , Rfl:      CALCIUM /VITAMIN D TABS   OR, 1 TABLET DAILY, Disp: 0, Rfl: 0     docusate sodium 100 MG tablet, Take 100 mg by mouth daily., Disp: 60 tablet, Rfl: 1     fish oil-omega-3 fatty acids (FISH OIL) 1000 MG capsule, Take 1 g by mouth daily., Disp: , Rfl:      IBUPROFEN PO, Take 200 mg by mouth as needed., Disp: , Rfl:      latanoprost (XALATAN) 0.005 % ophthalmic solution, daily, Disp: , Rfl: 1     levothyroxine (SYNTHROID/LEVOTHROID) 88 MCG tablet, TAKE ONE TABLET BY MOUTH EVERY MORNING, Disp: 90 tablet, Rfl: 3     Magnesium 250 MG tablet, Take 1 tablet by mouth daily., Disp: , Rfl:      MULTIPLE VITAMIN CAPS   OR, daily, Disp: , Rfl: 0     VITAMIN C TABS 500 MG OR, 1 TABLET TWICE DAILY, Disp: 0, Rfl: 0     vitamin E 400 UNIT capsule, Two times weekly, Disp: 30 capsule, Rfl: 0     ALPRAZolam (XANAX) 0.25 MG tablet, Take 1-2 tablets (0.25-0.5 mg) by mouth as needed for anxiety (Patient not taking: Reported on 12/11/2018), Disp: 20 tablet, Rfl: 0     aspirin 81 MG EC tablet, Take 81 mg by mouth daily., Disp: , Rfl:      magnesium hydroxide (MILK OF MAGNESIA) 400 MG/5ML suspension, Take 30-60 mLs by mouth daily as needed for constipation., Disp: 360 mL, Rfl: 1    SOCIAL HISTORY:  Social History     Socioeconomic History     Marital status:      Spouse name: Not on file     Number of children: 2     Years of education: 14     Highest education level: Not on file   Social Needs     Financial resource strain: Not on file     Food insecurity - worry: Not on file      Food insecurity - inability: Not on file     Transportation needs - medical: Not on file     Transportation needs - non-medical: Not on file   Occupational History     Not on file   Tobacco Use     Smoking status: Former Smoker     Years: 7.00     Last attempt to quit: 1993     Years since quittin.7     Smokeless tobacco: Never Used   Substance and Sexual Activity     Alcohol use: Yes     Comment: 3-5 drinks per week     Drug use: No     Sexual activity: Not Currently     Partners: Male   Other Topics Concern      Service No     Blood Transfusions No     Caffeine Concern No     Occupational Exposure No     Hobby Hazards No     Sleep Concern No     Stress Concern No     Weight Concern No     Special Diet No     Back Care No     Exercise Yes     Comment: 4/wk cardio strength training     Bike Helmet Yes     Seat Belt Yes     Self-Exams Yes     Parent/sibling w/ CABG, MI or angioplasty before 65F 55M? No   Social History Narrative     Not on file       FAMILY HISTORY: (I personally reviewed this history with the patient at today's visit)  Family History   Problem Relation Age of Onset     Cancer Paternal Grandmother         breast cancer     Gynecology Mother         endometriosis     Cancer Father         prostate cancer     Alcohol/Drug Father         alcohol problems and smoker          ROS:    No fevers or chills  No weight loss  No blurry vision, double vision or change in vision  No sore throat  No lymphadenopathy  No headache, paraesthesias, or weakness in a limb  No shortness of breath or wheezing  No chest pain or pressure  No arthralgias or myalgias  No rashes or skin changes  No odynophagia or dysphagia  No BRBPR, hematochezia, melena  No dysuria, frequency or urgency  No hot/cold intolerance or polyria  No anxiety or depression  PHYSICAL EXAMINATION:  Constitutional: aaox3, cooperative, pleasant, not dyspneic/diaphoretic, no acute distress  Vitals reviewed: /77   Pulse 90   Temp  "97.5  F (36.4  C)   Resp 16   Ht 1.6 m (5' 3\")   Wt 51.3 kg (113 lb 1.6 oz)   LMP 04/13/2001   SpO2 98%   BMI 20.03 kg/m    Wt:   Wt Readings from Last 2 Encounters:   12/11/18 51.3 kg (113 lb 1.6 oz)   11/15/18 52.3 kg (115 lb 4.8 oz)      Eyes: Sclera anicteric/injected  Ears/nose/mouth/throat: Normal oropharynx without ulcers or exudate, mucus membranes moist, hearing intact  Neck: supple, thyroid normal size  CV: No edema, RRR  Respiratory: Unlabored breathing, CTAB  Lymph: No submandibular, supraclavicular or inguinal lymphadenopathy  Abd: Nondistended, no masses, +bs, no hepatosplenomegaly, nontender, no peritoneal signs  Skin: warm, perfused, no jaundice  Psych: Normal affect  MSK: Normal gait      PERTINENT STUDIES: (I personally reviewed these laboratory studies today)  Most recent CBC:   Recent Labs   Lab Test 11/10/18  1159 10/21/18  0805   WBC 8.4 11.1*   HGB 13.9 14.7   HCT 41.6 42.8    218     Most recent hepatic panel:  Recent Labs   Lab Test 11/10/18  1156 10/21/18  0805   ALT 17 15   AST 16 18     Most recent creatinine:  Recent Labs   Lab Test 11/10/18  1156 10/21/18  0805   CR 0.88 0.84       RADIOLOGY:    See HPI    ASSESSMENT/PLAN:    Umm Claros is a 58 year old female who presents for evaluation of recurrent symptomatic sigmoid diverticulitis.  She has had now several episodes of sigmoid diverticulitis with 3 episodes requiring antibiotic therapy within the last year.  She is considering surgical resection and is seeing a colorectal surgeon in the near future.  I have recommended that she undergo an updated colonoscopy prior to considering surgical resection given the ongoing episodes in order to ensure there is not any colorectal neoplasia or other inflammatory process contributing to her episodes.  Her episodes of diverticulitis are primarily characterized by abdominal pain along with positive CT imaging findings but are mildly atypical in that she does not have any fever or " significantly elevated white count.  She does have some other abdominal pain symptoms which do worsen with stress and are improved with bowel movements so there may be some small component of irritable bowel syndrome but I feel that her primary issue is true diverticulitis and that she may have a significant benefit from surgical resection, should that be recommended.    We will plan to follow-up with her at the time of her colonoscopy which should be 1 month from now (6 weeks from her last episode of diverticulitis).  I recommended that she start a regimen with MiraLAX 1 scoop and fiber1 scoop daily.  We will plan to schedule the colonoscopy with monitored anesthesia care given that her colonoscopy from 2012 was noted to have a tortuous colon and a significant amount of sedation medication was required.      Diverticulitis of colon    RTC Depending on findings on colonoscopy and surgical consultation.      Thank you for this consultation.  It was a pleasure to participate in the care of this patient; please contact us with any further questions.     This note was created with voice recognition software, and while reviewed for accuracy, typos may remain.     Josh Carter MD  Adjunct  of Medicine  Division of Gastroenterology, Hepatology and Nutrition  [facility]  [phone]

## 2018-12-11 NOTE — NURSING NOTE
"Umm Claros's goals for this visit include: appointment for diverticulitis  She requests these members of her care team be copied on today's visit information:     PCP: Lindsay Padgett    Referring Provider:  Cesar Tucker, DO  [address] [facility] [address]  [place] [address]    /77   Pulse 90   Temp 97.5  F (36.4  C)   Resp 16   Ht 1.6 m (5' 3\")   Wt 51.3 kg (113 lb 1.6 oz)   LMP 04/13/2001   SpO2 98%   BMI 20.03 kg/m      Do you need any medication refills at today's visit?   "

## 2018-12-12 ENCOUNTER — OFFICE VISIT (OUTPATIENT)
Dept: SURGERY | Facility: CLINIC | Age: 58
End: 2018-12-12
Payer: COMMERCIAL

## 2018-12-12 VITALS
DIASTOLIC BLOOD PRESSURE: 66 MMHG | SYSTOLIC BLOOD PRESSURE: 111 MMHG | OXYGEN SATURATION: 99 % | WEIGHT: 112.6 LBS | BODY MASS INDEX: 19.95 KG/M2 | HEIGHT: 63 IN | HEART RATE: 85 BPM

## 2018-12-12 DIAGNOSIS — K57.32 DIVERTICULITIS OF SIGMOID COLON: Primary | ICD-10-CM

## 2018-12-12 ASSESSMENT — PAIN SCALES - GENERAL: PAINLEVEL: NO PAIN (1)

## 2018-12-12 ASSESSMENT — MIFFLIN-ST. JEOR: SCORE: 1059.88

## 2018-12-12 NOTE — NURSING NOTE
"Chief Complaint   Patient presents with     Consult     Diverticulitis surgery consult.     Referral     Self referral.       Vitals:    12/12/18 1200   BP: 111/66   Pulse: 85   SpO2: 99%   Weight: 112 lb 9.6 oz   Height: 5' 3\"       Body mass index is 19.95 kg/m .      Nadege Benjamin, EMT                      "

## 2018-12-12 NOTE — PROGRESS NOTES
Colon and Rectal Surgery Consult Clinic Note    Referring provider:  Referred Self, MD  No address on file       RE: Umm Claros  : 1960  ALYSSA: 2018    Umm Claros is a very pleasant 58 year old female who presents today with her , Bert, for recurrent diverticulitis.    History of Present Ilness: Umm reports that she has had diverticulitis since about .  She thinks she may have had a few untreated episodes prior to that.  She has had about 6 flares since .  These have all been treated with outpatient antibiotics.  However, in the past year she has had 3 episodes.  She had diverticulitis in the 2017 and again in 2018 which were both treated with oral antibiotics.  In mid October, she developed diverticulitis again.  This was treated with 10 days of oral antibiotics but did not resolve and was then treated with an additional 15 days of antibiotics.  She developed tendonitis with Cipro in the past so has treated subsequent episodes with Augmentin.  She has now been off of antibiotics for 23 days.  She has some left lower quadrant discomfort and some low back discomfort but no significant pain. She owns a motor home and she and her  feel that planning trips is difficult due to her recurrent symptoms. She has a long-standing history of constipation with bowel movements are now fairly normal.  She reports that her constipation started when she was diagnosed with hypothyroidism in  or .  She is eating and drinking without difficulty.    She had a colonoscopy last in  and about at 35 cm in the mid sigmoid colon there was active diverticulitis.  She had a abdominal CT on 10/21/2018 with acute diverticulitis in the sigmoid colon and a repeat CT on 11/10/2018 with moderate wall thickening of the sigmoid colon concerning for acute sigmoid diverticulitis.  No evidence of abscess.    PSH: Left LSO and laparoscopic appendectomy.  PMH: She is otherwise healthy.      Social: Denies smoking. She is a retired RN.    PLEASE SEE NOTE BELOW FOR PHYSICAL EXAMINATION, REVIEW OF SYSTEMS, AND OTHER HISTORY.    Assessment/Plan: 58 year old female with recurrent diverticulitis. I had a long discussion with Umm and her  regarding her options. Discussed that surgical intervention would be indicated and that she may develop recurrent diverticulitis. She has been able, despite repeat episodes, to not develop complicated diverticulitis, although the most recent episode was worse. This is unlikely to cause perforation but this is always a possibility. Would recommend colonoscopy to rule out malignancy. If she would like surgical intervention, we discussed laparoscopic sigmoid colectomy. But at this point, she is reticent to proceeds. Discussed risks of surgery including low risk of anastomotic leak requiring temporary diverting ostomy. She would like to get the colonoscopy and then decide on surgery. In the meantime, she has an antibiotic prescription to use if she gets recurrent symptoms. She will follow up with our clinic if she would like to schedule surgery.     Total face to face time was 30 minutes, >50% counseling.    PLEASE SEE NOTE BELOW FOR PHYSICAL EXAMINATION, REVIEW OF SYSTEMS, AND OTHER HISTORY.    Chioma Diallo MD  Colon and Rectal Surgery Staff  [facility]      -------------------------------------------------------------------------------------------------------------------  Review of Systems     Constitutional:  Negative for fever, chills, weight loss, weight gain, fatigue, decreased appetite, night sweats, recent stressors, height gain, height loss, post-operative complications, incisional pain, hallucinations, increased energy, hyperactivity and confused.   HENT:  Negative for ear pain, hearing loss, tinnitus, nosebleeds, trouble swallowing, hoarse voice, mouth sores, sore throat, ear discharge, tooth pain, gum tenderness,  taste disturbance, smell disturbance, hearing aid, bleeding gums, dry mouth, sinus pain, sinus congestion and neck mass.    Eyes:  Negative for double vision, pain, redness, eye pain, decreased vision, eye watering, eye bulging, eye dryness, flashing lights, spots, floaters, strabismus, tunnel vision, jaundice and eye irritation.   Respiratory:   Negative for cough, hemoptysis, sputum production, shortness of breath, wheezing, sleep disturbances due to breathing, snores loudly, respiratory pain, dyspnea on exertion, cough disturbing sleep and postural dyspnea.    Cardiovascular:  Negative for chest pain, dyspnea on exertion, palpitations, orthopnea, claudication, leg swelling, fingers/toes turn blue, hypertension, hypotension, syncope, history of heart murmur, chest pain on exertion, chest pain at rest, pacemaker, few scattered varicosities, leg pain, sleep disturbances due to breathing, tachycardia, light-headedness, exercise intolerance and edema.   Gastrointestinal:  Positive for abdominal pain. Negative for heartburn, nausea, vomiting, diarrhea, constipation, blood in stool, melena, rectal pain, bloating, bowel incontinence, jaundice, coffee ground emesis and change in stool.   Genitourinary:  Positive for dyspareunia, decreased libido, arousal difficulty, hot flashes and vaginal dryness. Negative for bladder incontinence, dysuria, urgency, hematuria, flank pain, vaginal discharge, difficulty urinating, genital sores, nocturia, voiding less frequently, abnormal vaginal bleeding, excessive menstruation, menstrual changes and postmenopausal bleeding.   Musculoskeletal:  Negative for myalgias, back pain, joint swelling, arthralgias, stiffness, muscle cramps, neck pain, bone pain, muscle weakness and fracture.   Skin:  Negative for nail changes, itching, poor wound healing, rash, hair changes, skin changes, acne, warts, poor wound healing, scarring, flaky skin, Raynaud's phenomenon, sensitivity to sunlight and skin  thickening.   Neurological:  Negative for dizziness, tingling, tremors, speech change, seizures, loss of consciousness, weakness, light-headedness, numbness, headaches, disturbances in coordination, extremity numbness, memory loss, difficulty walking and paralysis.   Endo/Heme:  Negative for anemia, swollen glands and bruises/bleeds easily.   Psychiatric/Behavioral:  Negative for depression, hallucinations, memory loss, decreased concentration, mood swings and panic attacks.    Breast:  Negative for breast discharge, breast mass, breast pain and nipple retraction.   Endocrine:  Negative for altered temperature regulation, polyphagia, polydipsia, unwanted hair growth and change in facial hair.        Medical history:  Past Medical History:   Diagnosis Date     Anxiety state, unspecified      Depressive disorder, not elsewhere classified     depression     Raynaud's syndrome      Tobacco use disorder      Unspecified hypothyroidism        Surgical history:  Past Surgical History:   Procedure Laterality Date     C APPENDECTOMY  05/26/00    Laparoscopic drainage of left ovarian cyst and lysis of adhesions, dilatation and curettage, and laparoscopic appendectomy performed by Dr. Ryan     COLONOSCOPY  4/10/2012    Procedure:COLONOSCOPY; Colonoscopy; Surgeon:BELL LAYTON; Location: GI     HC REMOVAL OF OVARY/TUBE(S)  01/30/01    Laparoscopic left sided salpingo-oophorectomy. Cystoscopy     HC REMOVE TONSILS/ADENOIDS,<11 Y/O      T & A <12y.o.       Problem list:    Patient Active Problem List    Diagnosis Date Noted     Abdominal cramping 11/06/2018     Priority: Medium     Vaginal atrophy 02/26/2018     Priority: Medium     De Quervain's disease (tenosynovitis) 11/06/2017     Priority: Medium     Advance Care Planning 07/31/2017     Priority: Medium     Diverticulitis of colon 02/23/2017     Priority: Medium     Chronic constipation 03/14/2016     Priority: Medium     Osteopenia of prematurity 11/09/2012      Priority: Medium     Intradermal nevus 08/08/2011     Priority: Medium     right side of neck       Hypothyroidism 10/03/2005     Priority: Medium       Medications:  Current Outpatient Medications   Medication Sig Dispense Refill     Acetaminophen (TYLENOL PO)        aspirin 81 MG EC tablet Take 81 mg by mouth daily.       CALCIUM /VITAMIN D TABS   OR 1 TABLET DAILY 0 0     docusate sodium 100 MG tablet Take 100 mg by mouth daily. 60 tablet 1     fish oil-omega-3 fatty acids (FISH OIL) 1000 MG capsule Take 1 g by mouth daily.       IBUPROFEN PO Take 200 mg by mouth as needed.       latanoprost (XALATAN) 0.005 % ophthalmic solution daily  1     levothyroxine (SYNTHROID/LEVOTHROID) 88 MCG tablet TAKE ONE TABLET BY MOUTH EVERY MORNING 90 tablet 3     Magnesium 250 MG tablet Take 1 tablet by mouth daily.       magnesium hydroxide (MILK OF MAGNESIA) 400 MG/5ML suspension Take 30-60 mLs by mouth daily as needed for constipation. 360 mL 1     MULTIPLE VITAMIN CAPS   OR daily  0     VITAMIN C TABS 500 MG OR 1 TABLET TWICE DAILY 0 0     vitamin E 400 UNIT capsule Two times weekly 30 capsule 0     ALPRAZolam (XANAX) 0.25 MG tablet Take 1-2 tablets (0.25-0.5 mg) by mouth as needed for anxiety (Patient not taking: Reported on 12/11/2018) 20 tablet 0       Allergies:  Allergies   Allergen Reactions     No Known Allergies      Tramadol      Other reaction(s): Other - Describe In Comment Field  Dizzy and double vision       Family history:  Family History   Problem Relation Age of Onset     Cancer Paternal Grandmother         breast cancer     Gynecology Mother         endometriosis     Cancer Father         prostate cancer     Alcohol/Drug Father         alcohol problems and smoker       Social history:  Social History     Socioeconomic History     Marital status:      Spouse name: Not on file     Number of children: 2     Years of education: 14     Highest education level: Not on file   Social Needs     Financial  "resource strain: Not on file     Food insecurity - worry: Not on file     Food insecurity - inability: Not on file     Transportation needs - medical: Not on file     Transportation needs - non-medical: Not on file   Occupational History     Not on file   Tobacco Use     Smoking status: Former Smoker     Years: 7.00     Last attempt to quit: 1993     Years since quittin.7     Smokeless tobacco: Never Used   Substance and Sexual Activity     Alcohol use: Yes     Comment: 3-5 drinks per week     Drug use: No     Sexual activity: Not Currently     Partners: Male   Other Topics Concern      Service No     Blood Transfusions No     Caffeine Concern No     Occupational Exposure No     Hobby Hazards No     Sleep Concern No     Stress Concern No     Weight Concern No     Special Diet No     Back Care No     Exercise Yes     Comment: 4/wk cardio strength training     Bike Helmet Yes     Seat Belt Yes     Self-Exams Yes     Parent/sibling w/ CABG, MI or angioplasty before 65F 55M? No   Social History Narrative     Not on file         Nursing Notes:   Nadege Benjamin CMA  2018 12:02 PM  Signed  Chief Complaint   Patient presents with     Consult     Diverticulitis surgery consult.     Referral     Self referral.       Vitals:    18 1200   BP: 111/66   Pulse: 85   SpO2: 99%   Weight: 112 lb 9.6 oz   Height: 5' 3\"       Body mass index is 19.95 kg/m .      Nadege Benjamin, EMT                         Physical Examination:  /66   Pulse 85   Ht 5' 3\"   Wt 112 lb 9.6 oz   LMP 2001   SpO2 99%   BMI 19.95 kg/m    General: alert, oriented, in no acute distress, sitting comfortably  HEENT: mucous membranes moist  Abdomen: soft, non distended, non tender on palpation        "

## 2018-12-12 NOTE — LETTER
2018       RE: Umm Claros  [address] [facility] [address]     Dear Colleague,    Thank you for referring your patient, Umm Claros, to the [facility] COLON AND RECTAL SURGERY at [facility]. Please see a copy of my visit note below.    Colon and Rectal Surgery Consult Clinic Note    Referring provider:  Referred Self, MD  No address on file       RE: Umm Claros  : 1960  ALYSSA: 2018    Umm Claros is a very pleasant 58 year old female who presents today with her , Bert, for recurrent diverticulitis.    History of Present Ilness: Umm reports that she has had diverticulitis since about .  She thinks she may have had a few untreated episodes prior to that.  She has had about 6 flares since .  These have all been treated with outpatient antibiotics.  However, in the past year she has had 3 episodes.  She had diverticulitis in the 2017 and again in 2018 which were both treated with oral antibiotics.  In mid October, she developed diverticulitis again.  This was treated with 10 days of oral antibiotics but did not resolve and was then treated with an additional 15 days of antibiotics.  She developed tendonitis with Cipro in the past so has treated subsequent episodes with Augmentin.  She has now been off of antibiotics for 23 days.  She has some left lower quadrant discomfort and some low back discomfort but no significant pain. She owns a motor home and she and her  feel that planning trips is difficult due to her recurrent symptoms. She has a long-standing history of constipation with bowel movements are now fairly normal.  She reports that her constipation started when she was diagnosed with hypothyroidism in  or .  She is eating and drinking without difficulty.    She had a colonoscopy last in  and about at 35 cm in the mid sigmoid colon there was active diverticulitis.  She had a abdominal CT  on 10/21/2018 with acute diverticulitis in the sigmoid colon and a repeat CT on 11/10/2018 with moderate wall thickening of the sigmoid colon concerning for acute sigmoid diverticulitis.  No evidence of abscess.    PSH: Left LSO and laparoscopic appendectomy.  PMH: She is otherwise healthy.     Social: Denies smoking. She is a retired RN.    PLEASE SEE NOTE BELOW FOR PHYSICAL EXAMINATION, REVIEW OF SYSTEMS, AND OTHER HISTORY.    Assessment/Plan: 58 year old female with recurrent diverticulitis. I had a long discussion with Umm and her  regarding her options. Discussed that surgical intervention would be indicated and that she may develop recurrent diverticulitis. She has been able, despite repeat episodes, to not develop complicated diverticulitis, although the most recent episode was worse. This is unlikely to cause perforation but this is always a possibility. Would recommend colonoscopy to rule out malignancy. If she would like surgical intervention, we discussed laparoscopic sigmoid colectomy. But at this point, she is reticent to proceeds. Discussed risks of surgery including low risk of anastomotic leak requiring temporary diverting ostomy. She would like to get the colonoscopy and then decide on surgery. In the meantime, she has an antibiotic prescription to use if she gets recurrent symptoms. She will follow up with our clinic if she would like to schedule surgery.     Total face to face time was 30 minutes, >50% counseling.    PLEASE SEE NOTE BELOW FOR PHYSICAL EXAMINATION, REVIEW OF SYSTEMS, AND OTHER HISTORY.    Chioma Diallo MD  Colon and Rectal Surgery Staff  [facility]      -------------------------------------------------------------------------------------------------------------------  Review of Systems     Constitutional:  Negative for fever, chills, weight loss, weight gain, fatigue, decreased appetite, night sweats, recent stressors, height gain,  height loss, post-operative complications, incisional pain, hallucinations, increased energy, hyperactivity and confused.   HENT:  Negative for ear pain, hearing loss, tinnitus, nosebleeds, trouble swallowing, hoarse voice, mouth sores, sore throat, ear discharge, tooth pain, gum tenderness, taste disturbance, smell disturbance, hearing aid, bleeding gums, dry mouth, sinus pain, sinus congestion and neck mass.    Eyes:  Negative for double vision, pain, redness, eye pain, decreased vision, eye watering, eye bulging, eye dryness, flashing lights, spots, floaters, strabismus, tunnel vision, jaundice and eye irritation.   Respiratory:   Negative for cough, hemoptysis, sputum production, shortness of breath, wheezing, sleep disturbances due to breathing, snores loudly, respiratory pain, dyspnea on exertion, cough disturbing sleep and postural dyspnea.    Cardiovascular:  Negative for chest pain, dyspnea on exertion, palpitations, orthopnea, claudication, leg swelling, fingers/toes turn blue, hypertension, hypotension, syncope, history of heart murmur, chest pain on exertion, chest pain at rest, pacemaker, few scattered varicosities, leg pain, sleep disturbances due to breathing, tachycardia, light-headedness, exercise intolerance and edema.   Gastrointestinal:  Positive for abdominal pain. Negative for heartburn, nausea, vomiting, diarrhea, constipation, blood in stool, melena, rectal pain, bloating, bowel incontinence, jaundice, coffee ground emesis and change in stool.   Genitourinary:  Positive for dyspareunia, decreased libido, arousal difficulty, hot flashes and vaginal dryness. Negative for bladder incontinence, dysuria, urgency, hematuria, flank pain, vaginal discharge, difficulty urinating, genital sores, nocturia, voiding less frequently, abnormal vaginal bleeding, excessive menstruation, menstrual changes and postmenopausal bleeding.   Musculoskeletal:  Negative for myalgias, back pain, joint swelling,  arthralgias, stiffness, muscle cramps, neck pain, bone pain, muscle weakness and fracture.   Skin:  Negative for nail changes, itching, poor wound healing, rash, hair changes, skin changes, acne, warts, poor wound healing, scarring, flaky skin, Raynaud's phenomenon, sensitivity to sunlight and skin thickening.   Neurological:  Negative for dizziness, tingling, tremors, speech change, seizures, loss of consciousness, weakness, light-headedness, numbness, headaches, disturbances in coordination, extremity numbness, memory loss, difficulty walking and paralysis.   Endo/Heme:  Negative for anemia, swollen glands and bruises/bleeds easily.   Psychiatric/Behavioral:  Negative for depression, hallucinations, memory loss, decreased concentration, mood swings and panic attacks.    Breast:  Negative for breast discharge, breast mass, breast pain and nipple retraction.   Endocrine:  Negative for altered temperature regulation, polyphagia, polydipsia, unwanted hair growth and change in facial hair.        Medical history:  Past Medical History:   Diagnosis Date     Anxiety state, unspecified      Depressive disorder, not elsewhere classified     depression     Raynaud's syndrome      Tobacco use disorder      Unspecified hypothyroidism        Surgical history:  Past Surgical History:   Procedure Laterality Date     C APPENDECTOMY  05/26/00    Laparoscopic drainage of left ovarian cyst and lysis of adhesions, dilatation and curettage, and laparoscopic appendectomy performed by Dr. Ryan     COLONOSCOPY  4/10/2012    Procedure:COLONOSCOPY; Colonoscopy; Surgeon:BELL LAYTON; Location: GI     HC REMOVAL OF OVARY/TUBE(S)  01/30/01    Laparoscopic left sided salpingo-oophorectomy. Cystoscopy     HC REMOVE TONSILS/ADENOIDS,<11 Y/O      T & A <12y.o.       Problem list:    Patient Active Problem List    Diagnosis Date Noted     Abdominal cramping 11/06/2018     Priority: Medium     Vaginal atrophy 02/26/2018     Priority:  Medium     De Quervain's disease (tenosynovitis) 11/06/2017     Priority: Medium     Advance Care Planning 07/31/2017     Priority: Medium     Diverticulitis of colon 02/23/2017     Priority: Medium     Chronic constipation 03/14/2016     Priority: Medium     Osteopenia of prematurity 11/09/2012     Priority: Medium     Intradermal nevus 08/08/2011     Priority: Medium     right side of neck       Hypothyroidism 10/03/2005     Priority: Medium       Medications:  Current Outpatient Medications   Medication Sig Dispense Refill     Acetaminophen (TYLENOL PO)        aspirin 81 MG EC tablet Take 81 mg by mouth daily.       CALCIUM /VITAMIN D TABS   OR 1 TABLET DAILY 0 0     docusate sodium 100 MG tablet Take 100 mg by mouth daily. 60 tablet 1     fish oil-omega-3 fatty acids (FISH OIL) 1000 MG capsule Take 1 g by mouth daily.       IBUPROFEN PO Take 200 mg by mouth as needed.       latanoprost (XALATAN) 0.005 % ophthalmic solution daily  1     levothyroxine (SYNTHROID/LEVOTHROID) 88 MCG tablet TAKE ONE TABLET BY MOUTH EVERY MORNING 90 tablet 3     Magnesium 250 MG tablet Take 1 tablet by mouth daily.       magnesium hydroxide (MILK OF MAGNESIA) 400 MG/5ML suspension Take 30-60 mLs by mouth daily as needed for constipation. 360 mL 1     MULTIPLE VITAMIN CAPS   OR daily  0     VITAMIN C TABS 500 MG OR 1 TABLET TWICE DAILY 0 0     vitamin E 400 UNIT capsule Two times weekly 30 capsule 0     ALPRAZolam (XANAX) 0.25 MG tablet Take 1-2 tablets (0.25-0.5 mg) by mouth as needed for anxiety (Patient not taking: Reported on 12/11/2018) 20 tablet 0       Allergies:  Allergies   Allergen Reactions     No Known Allergies      Tramadol      Other reaction(s): Other - Describe In Comment Field  Dizzy and double vision       Family history:  Family History   Problem Relation Age of Onset     Cancer Paternal Grandmother         breast cancer     Gynecology Mother         endometriosis     Cancer Father         prostate cancer      "Alcohol/Drug Father         alcohol problems and smoker       Social history:  Social History     Socioeconomic History     Marital status:      Spouse name: Not on file     Number of children: 2     Years of education: 14     Highest education level: Not on file   Social Needs     Financial resource strain: Not on file     Food insecurity - worry: Not on file     Food insecurity - inability: Not on file     Transportation needs - medical: Not on file     Transportation needs - non-medical: Not on file   Occupational History     Not on file   Tobacco Use     Smoking status: Former Smoker     Years: 7.00     Last attempt to quit: 1993     Years since quittin.7     Smokeless tobacco: Never Used   Substance and Sexual Activity     Alcohol use: Yes     Comment: 3-5 drinks per week     Drug use: No     Sexual activity: Not Currently     Partners: Male   Other Topics Concern      Service No     Blood Transfusions No     Caffeine Concern No     Occupational Exposure No     Hobby Hazards No     Sleep Concern No     Stress Concern No     Weight Concern No     Special Diet No     Back Care No     Exercise Yes     Comment: 4/wk cardio strength training     Bike Helmet Yes     Seat Belt Yes     Self-Exams Yes     Parent/sibling w/ CABG, MI or angioplasty before 65F 55M? No   Social History Narrative     Not on file         Nursing Notes:   Nadege Benjamin CMA  2018 12:02 PM  Signed  Chief Complaint   Patient presents with     Consult     Diverticulitis surgery consult.     Referral     Self referral.       Vitals:    18 1200   BP: 111/66   Pulse: 85   SpO2: 99%   Weight: 112 lb 9.6 oz   Height: 5' 3\"     Body mass index is 19.95 kg/m .    Nadege Benjamin, WINDY     Physical Examination:  /66   Pulse 85   Ht 5' 3\"   Wt 112 lb 9.6 oz   LMP 2001   SpO2 99%   BMI 19.95 kg/m     General: alert, oriented, in no acute distress, sitting comfortably  HEENT: mucous membranes " moist  Abdomen: soft, non distended, non tender on palpation    Again, thank you for allowing me to participate in the care of your patient.      Sincerely,    Chioma Diallo MD

## 2018-12-15 ASSESSMENT — ENCOUNTER SYMPTOMS
SPEECH CHANGE: 0
CHILLS: 0
BREAST MASS: 0
VOMITING: 0
RESPIRATORY PAIN: 0
JOINT SWELLING: 0
SORE THROAT: 0
NAUSEA: 0
BREAST PAIN: 0
EYE REDNESS: 0
DECREASED CONCENTRATION: 0
POSTURAL DYSPNEA: 0
INCREASED ENERGY: 0
COUGH DISTURBING SLEEP: 0
EYE WATERING: 0
TACHYCARDIA: 0
WEAKNESS: 0
HYPOTENSION: 0
SINUS CONGESTION: 0
BRUISES/BLEEDS EASILY: 0
TINGLING: 0
DIARRHEA: 0
DEPRESSION: 0
WEIGHT GAIN: 0
WHEEZING: 0
JAUNDICE: 0
DECREASED LIBIDO: 1
SKIN CHANGES: 0
DOUBLE VISION: 0
PALPITATIONS: 0
SNORES LOUDLY: 0
SEIZURES: 0
HOARSE VOICE: 0
SHORTNESS OF BREATH: 0
ABDOMINAL PAIN: 1
BLOOD IN STOOL: 0
SLEEP DISTURBANCES DUE TO BREATHING: 0
LIGHT-HEADEDNESS: 0
RECTAL PAIN: 0
SMELL DISTURBANCE: 0
TASTE DISTURBANCE: 0
MUSCLE WEAKNESS: 0
ORTHOPNEA: 0
STIFFNESS: 0
BACK PAIN: 0
SPUTUM PRODUCTION: 0
PANIC: 0
NIGHT SWEATS: 0
POLYPHAGIA: 0
EXERCISE INTOLERANCE: 0
POLYDIPSIA: 0
NECK MASS: 0
HYPERTENSION: 0
ARTHRALGIAS: 0
FEVER: 0
MYALGIAS: 0
MUSCLE CRAMPS: 0
SWOLLEN GLANDS: 0
HOT FLASHES: 1
INSOMNIA: 0
BOWEL INCONTINENCE: 0
LOSS OF CONSCIOUSNESS: 0
EXTREMITY NUMBNESS: 0
FATIGUE: 0
NECK PAIN: 0
PARALYSIS: 0
EYE IRRITATION: 0
LEG PAIN: 0
NERVOUS/ANXIOUS: 0
HEMATURIA: 0
DYSURIA: 0
EYE PAIN: 0
ALTERED TEMPERATURE REGULATION: 0
NAIL CHANGES: 0
TROUBLE SWALLOWING: 0
HALLUCINATIONS: 0
MEMORY LOSS: 0
SINUS PAIN: 0
CLAUDICATION: 0
FLANK PAIN: 0
DIFFICULTY URINATING: 0
DYSPNEA ON EXERTION: 0
TREMORS: 0
POOR WOUND HEALING: 0
LEG SWELLING: 0
HEMOPTYSIS: 0
HEADACHES: 0
DIZZINESS: 0
NUMBNESS: 0
HEARTBURN: 0
DECREASED APPETITE: 0
WEIGHT LOSS: 0
SYNCOPE: 0
COUGH: 0
BLOATING: 0
CONSTIPATION: 0
DISTURBANCES IN COORDINATION: 0

## 2018-12-20 ENCOUNTER — HOSPITAL ENCOUNTER (OUTPATIENT)
Facility: AMBULATORY SURGERY CENTER | Age: 58
End: 2018-12-20
Attending: INTERNAL MEDICINE
Payer: COMMERCIAL

## 2019-01-09 NOTE — PROGRESS NOTES
[address]  [facility] [address]  [phone]  Dept: [phone]    PRE-OP EVALUATION:  Today's date: 1/15/2019    Umm Claros (: 1960) presents for pre-operative evaluation assessment as requested by Dr. Castañeda.  She requires evaluation and anesthesia risk assessment prior to undergoing surgery/procedure for Colonoscopy .    Primary Physician: Lindsay Padgett  Type of Anesthesia Anticipated: to be determined    Patient has a Health Care Directive or Living Will:  YES     Preop Questions 1/15/2019   Who is doing your surgery? ash castañeda   What are you having done? colonoscopy with max sedation   Date of Surgery/Procedure: 2019   Facility or Hospital where procedure/surgery will be performed: [facility] outpatient   1.  Do you have a history of Heart attack, stroke, stent, coronary bypass surgery, or other heart surgery? No   2.  Do you ever have any pain or discomfort in your chest? No   3.  Do you have a history of  Heart Failure? No   4.   Are you troubled by shortness of breath when:  walking on a level surface, or up a slight hill, or at night? No   5.  Do you currently have a cold, bronchitis or other respiratory infection? No   6.  Do you have a cough, shortness of breath, or wheezing? No   7.  Do you sometimes get pains in the calves of your legs when you walk? No   8. Do you or anyone in your family have previous history of blood clots? No   9.  Do you or does anyone in your family have a serious bleeding problem such as prolonged bleeding following surgeries or cuts? No   10. Have you ever had problems with anemia or been told to take iron pills? No   11. Have you had any abnormal blood loss such as black, tarry or bloody stools, or abnormal vaginal bleeding? No   12. Have you ever had a blood transfusion? No   13. Have you or any of your relatives ever had problems with anesthesia? No   14. Do you have sleep apnea,  excessive snoring or daytime drowsiness? No   15. Do you have any prosthetic heart valves? No   16. Do you have prosthetic joints? No   17. Is there any chance that you may be pregnant? No         HPI:     HPI related to upcoming procedure: pt is scheduled for colonoscopy under anesthesia and is here for pre-op eval      See problem list for active medical problems.  Problems all longstanding and stable, except as noted/documented.  See ROS for pertinent symptoms related to these conditions.                                                                                                                                                          .  HYPOTHYROIDISM - Patient has a longstanding history of chronic Hypothyroidism. Patient has been doing well, noting no tremor, insomnia, hair loss or changes in skin texture. Continues to take medications as directed, without adverse reactions or side effects. Last TSH   Lab Results   Component Value Date    TSH 1.66 02/26/2018   .                                                                                                                                                                                                                        .    MEDICAL HISTORY:     Patient Active Problem List    Diagnosis Date Noted     Abdominal cramping 11/06/2018     Priority: Medium     Vaginal atrophy 02/26/2018     Priority: Medium     De Quervain's disease (tenosynovitis) 11/06/2017     Priority: Medium     Advance Care Planning 07/31/2017     Priority: Medium     Diverticulitis of colon 02/23/2017     Priority: Medium     Chronic constipation 03/14/2016     Priority: Medium     Osteopenia of prematurity 11/09/2012     Priority: Medium     Intradermal nevus 08/08/2011     Priority: Medium     right side of neck       Hypothyroidism 10/03/2005     Priority: Medium      Past Medical History:   Diagnosis Date     Anxiety state, unspecified      Depressive disorder, not elsewhere  classified     depression     Raynaud's syndrome      Tobacco use disorder      Unspecified hypothyroidism      Past Surgical History:   Procedure Laterality Date     C APPENDECTOMY  05/26/00    Laparoscopic drainage of left ovarian cyst and lysis of adhesions, dilatation and curettage, and laparoscopic appendectomy performed by Dr. Ryan     COLONOSCOPY  4/10/2012    Procedure:COLONOSCOPY; Colonoscopy; Surgeon:BELL LAYTON; Location:[facility]     HC REMOVAL OF OVARY/TUBE(S)  01/30/01    Laparoscopic left sided salpingo-oophorectomy. Cystoscopy     HC REMOVE TONSILS/ADENOIDS,<13 Y/O      T & A <12y.o.     Current Outpatient Medications   Medication Sig Dispense Refill     Acetaminophen (TYLENOL PO)        aspirin 81 MG EC tablet Take 81 mg by mouth daily.       CALCIUM /VITAMIN D TABS   OR 1 TABLET DAILY 0 0     docusate sodium 100 MG tablet Take 100 mg by mouth daily. 60 tablet 1     fish oil-omega-3 fatty acids (FISH OIL) 1000 MG capsule Take 1 g by mouth daily.       IBUPROFEN PO Take 200 mg by mouth as needed.       latanoprost (XALATAN) 0.005 % ophthalmic solution daily  1     levothyroxine (SYNTHROID/LEVOTHROID) 88 MCG tablet TAKE ONE TABLET BY MOUTH EVERY MORNING 90 tablet 3     Magnesium 250 MG tablet Take 1 tablet by mouth daily.       magnesium hydroxide (MILK OF MAGNESIA) 400 MG/5ML suspension Take 30-60 mLs by mouth daily as needed for constipation. 360 mL 1     MULTIPLE VITAMIN CAPS   OR daily  0     VITAMIN C TABS 500 MG OR 1 TABLET TWICE DAILY 0 0     vitamin E 400 UNIT capsule Two times weekly 30 capsule 0     ALPRAZolam (XANAX) 0.25 MG tablet Take 1-2 tablets (0.25-0.5 mg) by mouth as needed for anxiety (Patient not taking: Reported on 1/15/2019) 20 tablet 0     OTC products: None, except as noted above    Allergies   Allergen Reactions     No Known Allergies      Tramadol      Other reaction(s): Other - Describe In Comment Field  Dizzy and double vision      Latex Allergy: NO    Social History  "    Tobacco Use     Smoking status: Former Smoker     Years: 7.00     Last attempt to quit: 1993     Years since quittin.8     Smokeless tobacco: Never Used   Substance Use Topics     Alcohol use: Yes     Comment: 3-5 drinks per week     History   Drug Use No       REVIEW OF SYSTEMS:   Constitutional, neuro, ENT, endocrine, pulmonary, cardiac, gastrointestinal, genitourinary, musculoskeletal, integument and psychiatric systems are negative, except as otherwise noted.    EXAM:   /64 (BP Location: Right arm, Patient Position: Chair, Cuff Size: Adult Small)   Pulse 60   Temp 97.6  F (36.4  C) (Temporal)   Resp 16   Ht 1.6 m (5' 3\")   Wt 51.3 kg (113 lb)   LMP 2001   SpO2 100%   BMI 20.02 kg/m      GENERAL APPEARANCE: healthy, alert and no distress     EYES: EOMI, PERRL     HENT: ear canals and TM's normal and nose and mouth without ulcers or lesions     NECK: no adenopathy, no asymmetry, masses, or scars and thyroid normal to palpation     RESP: lungs clear to auscultation - no rales, rhonchi or wheezes     CV: regular rates and rhythm, normal S1 S2, no S3 or S4 and no murmur, click or rub     ABDOMEN:  soft, nontender, no HSM or masses and bowel sounds normal     MS: extremities normal- no gross deformities noted, no evidence of inflammation in joints, FROM in all extremities.     SKIN: no suspicious lesions or rashes     NEURO: Normal strength and tone, sensory exam grossly normal, mentation intact and speech normal     PSYCH: mentation appears normal. and affect normal/bright     LYMPHATICS: No cervical adenopathy    DIAGNOSTICS:   EKG: Not indicated due to non-vascular surgery and low risk of event (age <65 and without cardiac risk factors)    Recent Labs   Lab Test 11/10/18  1159 11/10/18  1156 10/21/18  0805   HGB 13.9  --  14.7     --  218   NA  --  142 139   POTASSIUM  --  3.9 4.2   CR  --  0.88 0.84        IMPRESSION:   Reason for surgery/procedure: recurrent " diverticulitis  Diagnosis/reason for consult: cardiovascular risk assessment    The proposed surgical procedure is considered LOW risk.    REVISED CARDIAC RISK INDEX  The patient has the following serious cardiovascular risks for perioperative complications such as (MI, PE, VFib and 3  AV Block):  No serious cardiac risks  INTERPRETATION: 0 risks: Class I (very low risk - 0.4% complication rate)    The patient has the following additional risks for perioperative complications:      ICD-10-CM    1. Screening for HIV (human immunodeficiency virus) Z11.4    2. Preop general physical exam Z01.818        RECOMMENDATIONS:       --Patient is to take all scheduled medications on the day of surgery EXCEPT for modifications listed below.  -Hold all OTC ibuprofen/Alleve and aspirin for 1 week prior to procedure  APPROVAL GIVEN to proceed with proposed procedure, without further diagnostic evaluation       Signed Electronically by: Lindsay Padgett MD    Copy of this evaluation report is provided to requesting physician.    [facility] Preop Guidelines    Revised Cardiac Risk Index

## 2019-01-15 ENCOUNTER — OFFICE VISIT (OUTPATIENT)
Dept: FAMILY MEDICINE | Facility: OTHER | Age: 59
End: 2019-01-15
Payer: COMMERCIAL

## 2019-01-15 VITALS
HEIGHT: 63 IN | TEMPERATURE: 97.6 F | SYSTOLIC BLOOD PRESSURE: 106 MMHG | BODY MASS INDEX: 20.02 KG/M2 | RESPIRATION RATE: 16 BRPM | OXYGEN SATURATION: 100 % | HEART RATE: 60 BPM | DIASTOLIC BLOOD PRESSURE: 64 MMHG | WEIGHT: 113 LBS

## 2019-01-15 DIAGNOSIS — Z11.4 SCREENING FOR HIV (HUMAN IMMUNODEFICIENCY VIRUS): Primary | ICD-10-CM

## 2019-01-15 DIAGNOSIS — E03.9 HYPOTHYROIDISM, UNSPECIFIED TYPE: ICD-10-CM

## 2019-01-15 DIAGNOSIS — Z01.818 PREOP GENERAL PHYSICAL EXAM: ICD-10-CM

## 2019-01-15 PROCEDURE — 99213 OFFICE O/P EST LOW 20 MIN: CPT | Performed by: FAMILY MEDICINE

## 2019-01-15 ASSESSMENT — PAIN SCALES - GENERAL: PAINLEVEL: NO PAIN (0)

## 2019-01-15 ASSESSMENT — MIFFLIN-ST. JEOR: SCORE: 1061.69

## 2019-03-08 DIAGNOSIS — E03.9 HYPOTHYROIDISM, UNSPECIFIED TYPE: ICD-10-CM

## 2019-03-08 LAB — TSH SERPL DL<=0.005 MIU/L-ACNC: 1.13 MU/L (ref 0.4–4)

## 2019-03-08 PROCEDURE — 84443 ASSAY THYROID STIM HORMONE: CPT | Performed by: FAMILY MEDICINE

## 2019-03-08 PROCEDURE — 36415 COLL VENOUS BLD VENIPUNCTURE: CPT | Performed by: FAMILY MEDICINE

## 2019-03-13 DIAGNOSIS — E03.9 HYPOTHYROIDISM, UNSPECIFIED TYPE: ICD-10-CM

## 2019-03-14 RX ORDER — LEVOTHYROXINE SODIUM 88 UG/1
TABLET ORAL
Qty: 90 TABLET | Refills: 3 | Status: SHIPPED | OUTPATIENT
Start: 2019-03-14 | End: 2020-02-25

## 2019-03-14 NOTE — TELEPHONE ENCOUNTER
Synthroid:  Prescription approved per [facility] Refill Protocol.    Laura Simeon, RN, BSN

## 2019-04-12 NOTE — PROGRESS NOTES
SUBJECTIVE:   CC: Umm Claros is an 58 year old woman who presents for preventive health visit.     Healthy Habits:     Getting at least 3 servings of Calcium per day:  Yes    Bi-annual eye exam:  Yes    Dental care twice a year:  NO    Sleep apnea or symptoms of sleep apnea:  None    Diet:  Regular (no restrictions)    Frequency of exercise:  2-3 days/week    Duration of exercise:  30-45 minutes    Taking medications regularly:  Yes    Medication side effects:  None    PHQ-2 Total Score: 0    Additional concerns today:  Yes        Today's PHQ-2 Score:   PHQ-2 (  [employer]) 4/15/2019   Q1: Little interest or pleasure in doing things 0   Q2: Feeling down, depressed or hopeless 0   PHQ-2 Score 0   Q1: Little interest or pleasure in doing things Not at all   Q2: Feeling down, depressed or hopeless Not at all   PHQ-2 Score 0       Abuse: Current or Past(Physical, Sexual or Emotional)- No  Do you feel safe in your environment? Yes    Social History     Tobacco Use     Smoking status: Former Smoker     Years: 7.00     Last attempt to quit: 1993     Years since quittin.0     Smokeless tobacco: Never Used   Substance Use Topics     Alcohol use: Yes     Comment: 3-5 drinks per week     If you drink alcohol do you typically have >3 drinks per day or >7 drinks per week? No  Reviewed orders with patient.  Reviewed health maintenance and updated orders accordingly - Yes  Labs reviewed in EPIC  BP Readings from Last 3 Encounters:   19 116/70   01/15/19 106/64   18 111/66    Wt Readings from Last 3 Encounters:   19 51.7 kg (114 lb)   01/15/19 51.3 kg (113 lb)   18 51.1 kg (112 lb 9.6 oz)                  Patient Active Problem List   Diagnosis     Hypothyroidism     Intradermal nevus     Osteopenia of prematurity     Chronic constipation     Diverticulitis of colon     Advance Care Planning     De Quervain's disease (tenosynovitis)     Vaginal atrophy     Abdominal cramping     Past Surgical  History:   Procedure Laterality Date     C APPENDECTOMY  00    Laparoscopic drainage of left ovarian cyst and lysis of adhesions, dilatation and curettage, and laparoscopic appendectomy performed by Dr. Ryan     COLONOSCOPY  4/10/2012    Procedure:COLONOSCOPY; Colonoscopy; Surgeon:BELL LAYTON; Location:[facility]     HC REMOVAL OF OVARY/TUBE(S)  01    Laparoscopic left sided salpingo-oophorectomy. Cystoscopy     HC REMOVE TONSILS/ADENOIDS,<13 Y/O      T & A <12y.o.       Social History     Tobacco Use     Smoking status: Former Smoker     Years: 7.00     Last attempt to quit: 1993     Years since quittin.0     Smokeless tobacco: Never Used   Substance Use Topics     Alcohol use: Yes     Comment: 3-5 drinks per week     Family History   Problem Relation Age of Onset     Cancer Paternal Grandmother         breast cancer     Gynecology Mother         endometriosis     Cancer Father         prostate cancer     Alcohol/Drug Father         alcohol problems and smoker         Current Outpatient Medications   Medication Sig Dispense Refill     Acetaminophen (TYLENOL PO)        aspirin 81 MG EC tablet Take 81 mg by mouth daily.       CALCIUM /VITAMIN D TABS   OR 1 TABLET DAILY 0 0     docusate sodium 100 MG tablet Take 100 mg by mouth daily. 60 tablet 1     fish oil-omega-3 fatty acids (FISH OIL) 1000 MG capsule Take 1 g by mouth daily.       IBUPROFEN PO Take 200 mg by mouth as needed.       latanoprost (XALATAN) 0.005 % ophthalmic solution daily  1     levothyroxine (SYNTHROID/LEVOTHROID) 88 MCG tablet TAKE ONE TABLET BY MOUTH EVERY MORNING 90 tablet 3     Magnesium 250 MG tablet Take 1 tablet by mouth daily.       magnesium hydroxide (MILK OF MAGNESIA) 400 MG/5ML suspension Take 30-60 mLs by mouth daily as needed for constipation. 360 mL 1     MULTIPLE VITAMIN CAPS   OR daily  0     VITAMIN C TABS 500 MG OR 1 TABLET TWICE DAILY 0 0     vitamin E 400 UNIT capsule Two times weekly 30 capsule 0      ALPRAZolam (XANAX) 0.25 MG tablet Take 1-2 tablets (0.25-0.5 mg) by mouth as needed for anxiety (Patient not taking: Reported on 1/15/2019) 20 tablet 0     Allergies   Allergen Reactions     No Known Allergies      Tramadol      Other reaction(s): Other - Describe In Comment Field  Dizzy and double vision     Recent Labs   Lab Test 03/08/19  1327 11/10/18  1156 10/21/18  0805 02/26/18  1031 08/07/17  1230  11/21/16  0859  02/16/12  0813   LDL  --   --   --  63  --   --  82  --  82   HDL  --   --   --  107  --   --  98  --  83   TRIG  --   --   --  53  --   --  56  --  78   ALT  --  17 15  --  19  --   --    < >  --    CR  --  0.88 0.84  --  0.78  --   --    < >  --    GFRESTIMATED  --  66 70  --  76  --   --    < >  --    GFRESTBLACK  --  80 84  --  >90   GFR Calc    --   --    < >  --    POTASSIUM  --  3.9 4.2  --  4.2  --   --    < >  --    TSH 1.13  --   --  1.66  --    < >  --    < > 1.59    < > = values in this interval not displayed.        Mammogram Screening: Patient over age 50, mutual decision to screen reflected in health maintenance.    Pertinent mammograms are reviewed under the imaging tab.  History of abnormal Pap smear: NO - age 30- 65 PAP every 3 years recommended  PAP / HPV Latest Ref Rng & Units 2/26/2018 3/30/2015   PAP - NIL NIL   HPV 16 DNA NEG:Negative Negative -   HPV 18 DNA NEG:Negative Negative -   OTHER HR HPV NEG:Negative Negative -     Reviewed and updated as needed this visit by clinical staff  Tobacco  Allergies  Meds  Med Hx  Surg Hx  Fam Hx  Soc Hx        Reviewed and updated as needed this visit by Provider          Past Medical History:   Diagnosis Date     Anxiety state, unspecified      Depressive disorder, not elsewhere classified     depression     Raynaud's syndrome      Tobacco use disorder      Unspecified hypothyroidism       Past Surgical History:   Procedure Laterality Date     C APPENDECTOMY  05/26/00    Laparoscopic drainage of left ovarian cyst  "and lysis of adhesions, dilatation and curettage, and laparoscopic appendectomy performed by Dr. Ryan     COLONOSCOPY  4/10/2012    Procedure:COLONOSCOPY; Colonoscopy; Surgeon:BELL LAYTON; Location: [facility]     HC REMOVAL OF OVARY/TUBE(S)  01/30/01    Laparoscopic left sided salpingo-oophorectomy. Cystoscopy     HC REMOVE TONSILS/ADENOIDS,<11 Y/O      T & A <12y.o.       Review of Systems   Constitutional: Negative for chills and fever.   HENT: Negative for congestion, ear pain, hearing loss and sore throat.    Eyes: Negative for pain and visual disturbance.   Respiratory: Negative for cough and shortness of breath.    Cardiovascular: Negative for chest pain, palpitations and peripheral edema.   Gastrointestinal: Negative for abdominal pain, constipation, diarrhea, heartburn, hematochezia and nausea.   Breasts:  Negative for tenderness, breast mass and discharge.   Genitourinary: Positive for frequency. Negative for dysuria, genital sores, hematuria, pelvic pain, urgency, vaginal bleeding and vaginal discharge.   Musculoskeletal: Positive for arthralgias. Negative for joint swelling and myalgias.   Skin: Negative for rash.   Neurological: Positive for dizziness. Negative for weakness, headaches and paresthesias.   Psychiatric/Behavioral: Negative for mood changes. The patient is not nervous/anxious.    All other systems reviewed and are negative.         OBJECTIVE:   /70 (BP Location: Left arm, Patient Position: Chair, Cuff Size: Adult Regular)   Pulse 88   Temp 97.8  F (36.6  C) (Temporal)   Resp 16   Ht 1.6 m (5' 3\")   Wt 51.7 kg (114 lb)   LMP 04/13/2001   SpO2 100%   BMI 20.19 kg/m    Physical Exam   Constitutional: She is oriented to person, place, and time. She appears well-developed and well-nourished. No distress.   HENT:   Right Ear: Tympanic membrane and external ear normal.   Left Ear: Tympanic membrane and external ear normal.   Nose: Nose normal.   Mouth/Throat: Oropharynx is clear " and moist. No oropharyngeal exudate.   Eyes: Pupils are equal, round, and reactive to light. Conjunctivae are normal. Right eye exhibits no discharge. Left eye exhibits no discharge.   Neck: Neck supple. No tracheal deviation present. No thyromegaly present.   Cardiovascular: Normal rate, regular rhythm, S1 normal, S2 normal, normal heart sounds and normal pulses. Exam reveals no S3, no S4 and no friction rub.   No murmur heard.  Pulmonary/Chest: Effort normal and breath sounds normal. No respiratory distress. She has no wheezes. She has no rales. Right breast exhibits no mass, no nipple discharge and no tenderness. Left breast exhibits no mass, no nipple discharge and no tenderness.   Abdominal: Soft. Bowel sounds are normal. She exhibits no mass. There is no hepatosplenomegaly. There is no tenderness.   Musculoskeletal: Normal range of motion. She exhibits no edema.   Lymphadenopathy:     She has no cervical adenopathy.   Neurological: She is alert and oriented to person, place, and time. She has normal strength and normal reflexes. She exhibits normal muscle tone.   Skin: Skin is warm and dry. No rash noted.   Psychiatric: She has a normal mood and affect. Judgment and thought content normal. Cognition and memory are normal.         Diagnostic Test Results:  none     ASSESSMENT/PLAN:     Problem List Items Addressed This Visit     None      Visit Diagnoses     Encounter for routine adult health examination without abnormal findings    -  Primary    Screening for HIV (human immunodeficiency virus)        Special screening for malignant neoplasms, colon        Relevant Orders    Fecal colorectal cancer screen (FIT)    Encounter for screening mammogram for breast cancer        Relevant Orders    *MA Screening Digital Bilateral            COUNSELING:  Reviewed preventive health counseling, as reflected in patient instructions       Regular exercise       Healthy diet/nutrition    BP Readings from Last 1 Encounters:  "  04/16/19 116/70     Estimated body mass index is 20.19 kg/m  as calculated from the following:    Height as of this encounter: 1.6 m (5' 3\").    Weight as of this encounter: 51.7 kg (114 lb).           reports that she quit smoking about 26 years ago. She quit after 7.00 years of use. She has never used smokeless tobacco.      Counseling Resources:  ATP IV Guidelines  Pooled Cohorts Equation Calculator  Breast Cancer Risk Calculator  FRAX Risk Assessment  ICSI Preventive Guidelines  Dietary Guidelines for Americans, 2010  USDA's MyPlate  ASA Prophylaxis  Lung CA Screening    Lindsay Padgett MD  [facility]  "

## 2019-04-15 ASSESSMENT — ENCOUNTER SYMPTOMS
CHILLS: 0
HEARTBURN: 0
CONSTIPATION: 0
ARTHRALGIAS: 1
PARESTHESIAS: 0
HEMATURIA: 0
JOINT SWELLING: 0
DIARRHEA: 0
MYALGIAS: 0
NERVOUS/ANXIOUS: 0
HEMATOCHEZIA: 0
DIZZINESS: 1
DYSURIA: 0
COUGH: 0
WEAKNESS: 0
NAUSEA: 0
BREAST MASS: 0
EYE PAIN: 0
SORE THROAT: 0
ABDOMINAL PAIN: 0
PALPITATIONS: 0
FEVER: 0
HEADACHES: 0
FREQUENCY: 1
SHORTNESS OF BREATH: 0

## 2019-04-16 ENCOUNTER — OFFICE VISIT (OUTPATIENT)
Dept: FAMILY MEDICINE | Facility: OTHER | Age: 59
End: 2019-04-16
Payer: COMMERCIAL

## 2019-04-16 VITALS
BODY MASS INDEX: 20.2 KG/M2 | RESPIRATION RATE: 16 BRPM | WEIGHT: 114 LBS | SYSTOLIC BLOOD PRESSURE: 116 MMHG | DIASTOLIC BLOOD PRESSURE: 70 MMHG | TEMPERATURE: 97.8 F | HEIGHT: 63 IN | OXYGEN SATURATION: 100 % | HEART RATE: 88 BPM

## 2019-04-16 DIAGNOSIS — K57.32 DIVERTICULITIS OF COLON: ICD-10-CM

## 2019-04-16 DIAGNOSIS — K59.09 CHRONIC CONSTIPATION: ICD-10-CM

## 2019-04-16 DIAGNOSIS — Z12.31 ENCOUNTER FOR SCREENING MAMMOGRAM FOR BREAST CANCER: ICD-10-CM

## 2019-04-16 DIAGNOSIS — Z00.00 ENCOUNTER FOR ROUTINE ADULT HEALTH EXAMINATION WITHOUT ABNORMAL FINDINGS: Primary | ICD-10-CM

## 2019-04-16 DIAGNOSIS — Z12.11 SPECIAL SCREENING FOR MALIGNANT NEOPLASMS, COLON: ICD-10-CM

## 2019-04-16 DIAGNOSIS — E03.9 HYPOTHYROIDISM, UNSPECIFIED TYPE: ICD-10-CM

## 2019-04-16 PROCEDURE — 99396 PREV VISIT EST AGE 40-64: CPT | Performed by: FAMILY MEDICINE

## 2019-04-16 ASSESSMENT — ENCOUNTER SYMPTOMS
CONSTIPATION: 0
ABDOMINAL PAIN: 0
NAUSEA: 0
MYALGIAS: 0
ARTHRALGIAS: 1
HEMATOCHEZIA: 0
WEAKNESS: 0
PARESTHESIAS: 0
NERVOUS/ANXIOUS: 0
JOINT SWELLING: 0
HEADACHES: 0
FREQUENCY: 1
BREAST MASS: 0
DIARRHEA: 0
HEMATURIA: 0
SORE THROAT: 0
DIZZINESS: 1
FEVER: 0
HEARTBURN: 0
CHILLS: 0
PALPITATIONS: 0
COUGH: 0
EYE PAIN: 0
SHORTNESS OF BREATH: 0
DYSURIA: 0

## 2019-04-16 ASSESSMENT — MIFFLIN-ST. JEOR: SCORE: 1066.23

## 2019-04-16 ASSESSMENT — PAIN SCALES - GENERAL: PAINLEVEL: NO PAIN (0)

## 2019-06-14 PROCEDURE — 82274 ASSAY TEST FOR BLOOD FECAL: CPT | Performed by: FAMILY MEDICINE

## 2019-06-17 ENCOUNTER — HOSPITAL ENCOUNTER (OUTPATIENT)
Dept: MAMMOGRAPHY | Facility: CLINIC | Age: 59
Discharge: HOME | End: 2019-06-17
Attending: FAMILY MEDICINE | Admitting: FAMILY MEDICINE
Payer: COMMERCIAL

## 2019-06-17 DIAGNOSIS — Z12.31 VISIT FOR SCREENING MAMMOGRAM: ICD-10-CM

## 2019-06-17 PROCEDURE — 77063 BREAST TOMOSYNTHESIS BI: CPT

## 2019-09-27 ENCOUNTER — HEALTH MAINTENANCE LETTER (OUTPATIENT)
Age: 59
End: 2019-09-27

## 2019-09-30 ENCOUNTER — MYC MEDICAL ADVICE (OUTPATIENT)
Dept: FAMILY MEDICINE | Facility: OTHER | Age: 59
End: 2019-09-30

## 2019-09-30 NOTE — TELEPHONE ENCOUNTER
Last TSH labs drawn 3/8/19, do you want patient to have labs prior to leaving the state or would you be ok with her waiting until 5/2020?

## 2020-01-23 ENCOUNTER — MYC MEDICAL ADVICE (OUTPATIENT)
Dept: FAMILY MEDICINE | Facility: OTHER | Age: 60
End: 2020-01-23

## 2020-01-23 DIAGNOSIS — N95.2 ATROPHIC VAGINITIS: Primary | ICD-10-CM

## 2020-01-23 RX ORDER — ESTRADIOL 0.1 MG/G
2 CREAM VAGINAL
Qty: 42.5 G | Refills: 3 | Status: SHIPPED | OUTPATIENT
Start: 2020-01-23 | End: 2020-06-04

## 2020-02-25 DIAGNOSIS — E03.9 HYPOTHYROIDISM, UNSPECIFIED TYPE: ICD-10-CM

## 2020-02-25 RX ORDER — LEVOTHYROXINE SODIUM 88 UG/1
TABLET ORAL
Qty: 90 TABLET | Refills: 0 | Status: SHIPPED | OUTPATIENT
Start: 2020-02-25 | End: 2020-06-04

## 2020-02-25 NOTE — TELEPHONE ENCOUNTER
Pending Prescriptions:                       Disp   Refills    levothyroxine (SYNTHROID/LEVOTHROID) 88 M*90 tab*0            Sig: TAKE 1 TABLET BY MOUTH ONCE DAILY IN THE MORNING    Medication is being filled for 90 day supply only due to:  Patient needs labs TSH.    Petra Omer, BSN, RN, PHN

## 2020-05-26 ENCOUNTER — MYC MEDICAL ADVICE (OUTPATIENT)
Dept: FAMILY MEDICINE | Facility: OTHER | Age: 60
End: 2020-05-26

## 2020-05-29 DIAGNOSIS — E03.9 HYPOTHYROIDISM, UNSPECIFIED TYPE: ICD-10-CM

## 2020-05-29 LAB — TSH SERPL DL<=0.005 MIU/L-ACNC: 0.51 MU/L (ref 0.4–4)

## 2020-05-29 PROCEDURE — 84443 ASSAY THYROID STIM HORMONE: CPT | Performed by: FAMILY MEDICINE

## 2020-05-29 PROCEDURE — 36415 COLL VENOUS BLD VENIPUNCTURE: CPT | Performed by: FAMILY MEDICINE

## 2020-06-01 ENCOUNTER — MYC REFILL (OUTPATIENT)
Dept: FAMILY MEDICINE | Facility: OTHER | Age: 60
End: 2020-06-01

## 2020-06-01 DIAGNOSIS — E03.9 HYPOTHYROIDISM, UNSPECIFIED TYPE: ICD-10-CM

## 2020-06-01 RX ORDER — LEVOTHYROXINE SODIUM 88 UG/1
TABLET ORAL
Qty: 90 TABLET | Refills: 0 | Status: CANCELLED | OUTPATIENT
Start: 2020-06-01

## 2020-06-01 NOTE — TELEPHONE ENCOUNTER
Patient is due for annual exam/med check. Please call and schedule for video visit before the patient runs out of the medication. If they do not have enough to make it to their video visit, send back to the RN. Indicate quantity that patient will need to make it to their appointment.    Laura Simeon, RN, BSN

## 2020-06-04 ENCOUNTER — VIRTUAL VISIT (OUTPATIENT)
Dept: FAMILY MEDICINE | Facility: OTHER | Age: 60
End: 2020-06-04
Payer: COMMERCIAL

## 2020-06-04 DIAGNOSIS — Z12.11 SPECIAL SCREENING FOR MALIGNANT NEOPLASMS, COLON: Primary | ICD-10-CM

## 2020-06-04 DIAGNOSIS — N95.2 ATROPHIC VAGINITIS: ICD-10-CM

## 2020-06-04 DIAGNOSIS — E03.9 HYPOTHYROIDISM, UNSPECIFIED TYPE: ICD-10-CM

## 2020-06-04 PROCEDURE — 99214 OFFICE O/P EST MOD 30 MIN: CPT | Mod: 95 | Performed by: FAMILY MEDICINE

## 2020-06-04 RX ORDER — LEVOTHYROXINE SODIUM 88 UG/1
88 TABLET ORAL DAILY
Qty: 90 TABLET | Refills: 3 | Status: SHIPPED | OUTPATIENT
Start: 2020-06-04 | End: 2021-05-25

## 2020-06-04 RX ORDER — ESTRADIOL 0.1 MG/G
2 CREAM VAGINAL
Qty: 42.5 G | Refills: 3 | Status: SHIPPED | OUTPATIENT
Start: 2020-06-05 | End: 2021-04-09

## 2020-06-04 NOTE — PROGRESS NOTES
"Umm Claros is a 59 year old female who is being evaluated via a billable telephone visit.      The patient has been notified of following:     \"This telephone visit will be conducted via a call between you and your physician/provider. We have found that certain health care needs can be provided without the need for a physical exam.  This service lets us provide the care you need with a short phone conversation.  If a prescription is necessary we can send it directly to your pharmacy.  If lab work is needed we can place an order for that and you can then stop by our lab to have the test done at a later time.    Telephone visits are billed at different rates depending on your insurance coverage. During this emergency period, for some insurers they may be billed the same as an in-person visit.  Please reach out to your insurance provider with any questions.    If during the course of the call the physician/provider feels a telephone visit is not appropriate, you will not be charged for this service.\"    Patient has given verbal consent for Telephone visit?  Yes    What phone number would you like to be contacted at? [phone]    How would you like to obtain your AVS? Lisandro Benavides     Umm Claros is a 59 year old female who presents via phone visit today for the following health issues:    HPI  Hypothyroidism Follow-up      Since last visit, patient describes the following symptoms: Weight stable, no hair loss, no skin changes, no constipation, no loose stools      How many servings of fruits and vegetables do you eat daily?  4 or more    On average, how many sweetened beverages do you drink each day (Examples: soda, juice, sweet tea, etc.  Do NOT count diet or artificially sweetened beverages)?   0    How many days per week do you exercise enough to make your heart beat faster? 3 or less    How many minutes a day do you exercise enough to make your heart beat faster? 20 - 29    How many days per week " do you miss taking your medication? 0      -------------------------------------    Patient Active Problem List   Diagnosis     Hypothyroidism     Intradermal nevus     Osteopenia of prematurity     Chronic constipation     Diverticulitis of colon     Advance Care Planning     De Quervain's disease (tenosynovitis)     Vaginal atrophy     Abdominal cramping     Past Surgical History:   Procedure Laterality Date     C APPENDECTOMY  00    Laparoscopic drainage of left ovarian cyst and lysis of adhesions, dilatation and curettage, and laparoscopic appendectomy performed by Dr. Ryan     COLONOSCOPY  4/10/2012    Procedure:COLONOSCOPY; Colonoscopy; Surgeon:BELL LAYTON; Location:[facility]     HC REMOVAL OF OVARY/TUBE(S)  01    Laparoscopic left sided salpingo-oophorectomy. Cystoscopy     HC REMOVE TONSILS/ADENOIDS,<11 Y/O      T & A <12y.o.       Social History     Tobacco Use     Smoking status: Former Smoker     Years: 7.00     Last attempt to quit: 1993     Years since quittin.1     Smokeless tobacco: Never Used   Substance Use Topics     Alcohol use: Yes     Comment: 3-5 drinks per week     Family History   Problem Relation Age of Onset     Cancer Paternal Grandmother         breast cancer     Gynecology Mother         endometriosis     Cancer Father         prostate cancer     Alcohol/Drug Father         alcohol problems and smoker         Current Outpatient Medications   Medication Sig Dispense Refill     CALCIUM /VITAMIN D TABS   OR 1 TABLET DAILY 0 0     docusate sodium 100 MG tablet Take 100 mg by mouth daily. 60 tablet 1     [START ON 2020] estradiol (ESTRACE) 0.1 MG/GM vaginal cream Place 2 g vaginally three times a week 42.5 g 3     fish oil-omega-3 fatty acids (FISH OIL) 1000 MG capsule Take 1 g by mouth daily.       latanoprost (XALATAN) 0.005 % ophthalmic solution daily  1     levothyroxine (SYNTHROID/LEVOTHROID) 88 MCG tablet Take 1 tablet (88 mcg) by mouth daily 90 tablet 3      Magnesium 250 MG tablet Take 1 tablet by mouth daily.       MULTIPLE VITAMIN CAPS   OR daily  0     VITAMIN C TABS 500 MG OR 1 TABLET TWICE DAILY 0 0     vitamin E 400 UNIT capsule Two times weekly 30 capsule 0     Acetaminophen (TYLENOL PO)        ALPRAZolam (XANAX) 0.25 MG tablet Take 1-2 tablets (0.25-0.5 mg) by mouth as needed for anxiety (Patient not taking: Reported on 1/15/2019) 20 tablet 0     aspirin 81 MG EC tablet Take 81 mg by mouth daily.       IBUPROFEN PO Take 200 mg by mouth as needed.       magnesium hydroxide (MILK OF MAGNESIA) 400 MG/5ML suspension Take 30-60 mLs by mouth daily as needed for constipation. 360 mL 1     Allergies   Allergen Reactions     No Known Allergies      Tramadol      Other reaction(s): Other - Describe In Comment Field  Dizzy and double vision     BP Readings from Last 3 Encounters:   04/16/19 116/70   01/15/19 106/64   12/12/18 111/66    Wt Readings from Last 3 Encounters:   04/16/19 51.7 kg (114 lb)   01/15/19 51.3 kg (113 lb)   12/12/18 51.1 kg (112 lb 9.6 oz)                    Reviewed and updated as needed this visit by Provider         Review of Systems   Constitutional, HEENT, cardiovascular, pulmonary, GI, , musculoskeletal, neuro, skin, endocrine and psych systems are negative, except as otherwise noted.       Objective   Reported vitals:  [facility] 04/13/2001    healthy, alert and no distress  PSYCH: Alert and oriented times 3; coherent speech, normal   rate and volume, able to articulate logical thoughts, able   to abstract reason, no tangential thoughts, no hallucinations   or delusions  Her affect is normal  RESP: No cough, no audible wheezing, able to talk in full sentences  Remainder of exam unable to be completed due to telephone visits    Diagnostic Test Results:  Labs reviewed in Epic        Assessment/Plan:  1. Special screening for malignant neoplasms, colon    - Fecal colorectal cancer screen (FIT); Future    2. Hypothyroidism, unspecified type  Well  controlled symptoms  TSH 5/20- WNL  Refill meds  Recheck in 1 yr  - levothyroxine (SYNTHROID/LEVOTHROID) 88 MCG tablet; Take 1 tablet (88 mcg) by mouth daily  Dispense: 90 tablet; Refill: 3    3. Atrophic vaginitis  Improved but not helping as much with urinary incontinence. Trial increasing the frequency to 3 times a day  - estradiol (ESTRACE) 0.1 MG/GM vaginal cream; Place 2 g vaginally three times a week  Dispense: 42.5 g; Refill: 3      Wishes to wait on mammogram and shingles shot until after COVID crisis  No follow-ups on file.      Phone call duration:  9 minutes    Lindsay Padgett MD

## 2020-08-24 DIAGNOSIS — Z12.11 SPECIAL SCREENING FOR MALIGNANT NEOPLASMS, COLON: ICD-10-CM

## 2020-08-24 PROCEDURE — 82274 ASSAY TEST FOR BLOOD FECAL: CPT | Performed by: FAMILY MEDICINE

## 2020-08-30 LAB — HEMOCCULT STL QL IA: NEGATIVE

## 2020-08-31 ENCOUNTER — TELEPHONE (OUTPATIENT)
Dept: FAMILY MEDICINE | Facility: OTHER | Age: 60
End: 2020-08-31

## 2020-08-31 NOTE — TELEPHONE ENCOUNTER
Lm for patient to call us back    Lindsay Padgett MD  [facility] Float Pool               Stool test negative for blood

## 2020-11-02 ENCOUNTER — MYC REFILL (OUTPATIENT)
Dept: FAMILY MEDICINE | Facility: OTHER | Age: 60
End: 2020-11-02

## 2020-11-02 DIAGNOSIS — F41.9 ANXIETY: ICD-10-CM

## 2020-11-02 NOTE — TELEPHONE ENCOUNTER
Pending Prescriptions:                       Disp   Refills    ALPRAZolam (XANAX) 0.25 MG tablet          20 tab*0        Sig: Take 1-2 tablets (0.25-0.5 mg) by mouth as needed for           anxiety    Routing refill request to provider for review/approval because:  Drug not on the G refill protocol   ALPRAZolam (XANAX) 0.25 MG tablet 20 tablet 0 2/26/2018

## 2020-11-03 RX ORDER — ALPRAZOLAM 0.25 MG
.25-.5 TABLET ORAL PRN
Qty: 20 TABLET | Refills: 0 | Status: SHIPPED | OUTPATIENT
Start: 2020-11-03 | End: 2020-11-05

## 2020-11-04 ENCOUNTER — MYC MEDICAL ADVICE (OUTPATIENT)
Dept: FAMILY MEDICINE | Facility: OTHER | Age: 60
End: 2020-11-04

## 2020-11-04 ENCOUNTER — TELEPHONE (OUTPATIENT)
Dept: FAMILY MEDICINE | Facility: OTHER | Age: 60
End: 2020-11-04

## 2020-11-04 DIAGNOSIS — F41.9 ANXIETY: ICD-10-CM

## 2020-11-04 NOTE — TELEPHONE ENCOUNTER
Please call [facility] pharmacy at [phone].  They need to clarify the frequency of patients xanax

## 2020-11-05 RX ORDER — ALPRAZOLAM 0.25 MG
.25-.5 TABLET ORAL PRN
Qty: 20 TABLET | Refills: 0 | Status: SHIPPED | OUTPATIENT
Start: 2020-11-05 | End: 2021-10-11

## 2021-01-09 ENCOUNTER — HEALTH MAINTENANCE LETTER (OUTPATIENT)
Age: 61
End: 2021-01-09

## 2021-04-07 ENCOUNTER — MYC MEDICAL ADVICE (OUTPATIENT)
Dept: FAMILY MEDICINE | Facility: OTHER | Age: 61
End: 2021-04-07

## 2021-04-07 DIAGNOSIS — N95.2 ATROPHIC VAGINITIS: ICD-10-CM

## 2021-04-09 RX ORDER — ESTRADIOL 0.1 MG/G
2 CREAM VAGINAL
Qty: 42.5 G | Refills: 4 | Status: SHIPPED | OUTPATIENT
Start: 2021-04-09 | End: 2022-02-15

## 2021-05-24 ENCOUNTER — MYC MEDICAL ADVICE (OUTPATIENT)
Dept: FAMILY MEDICINE | Facility: OTHER | Age: 61
End: 2021-05-24

## 2021-05-24 DIAGNOSIS — E03.9 HYPOTHYROIDISM, UNSPECIFIED TYPE: ICD-10-CM

## 2021-05-25 RX ORDER — LEVOTHYROXINE SODIUM 88 UG/1
88 TABLET ORAL DAILY
Qty: 90 TABLET | Refills: 3 | Status: SHIPPED | OUTPATIENT
Start: 2021-05-25 | End: 2022-05-03

## 2021-05-28 ENCOUNTER — MYC MEDICAL ADVICE (OUTPATIENT)
Dept: FAMILY MEDICINE | Facility: OTHER | Age: 61
End: 2021-05-28

## 2021-05-28 NOTE — TELEPHONE ENCOUNTER
Responded via e27.  Jorge Barragan, YAMILETHN, RN, PHN  [facility]  May 28, 2021

## 2021-06-17 ENCOUNTER — MYC MEDICAL ADVICE (OUTPATIENT)
Dept: FAMILY MEDICINE | Facility: OTHER | Age: 61
End: 2021-06-17

## 2021-06-17 DIAGNOSIS — E03.9 HYPOTHYROIDISM, UNSPECIFIED TYPE: Primary | ICD-10-CM

## 2021-06-17 NOTE — TELEPHONE ENCOUNTER
Future orders placed for fasting labs  Will discuss dexa scan during visit  Schedule her for COVID shot on the day of her physical\

## 2021-07-05 DIAGNOSIS — E03.9 HYPOTHYROIDISM, UNSPECIFIED TYPE: ICD-10-CM

## 2021-07-05 LAB
ALBUMIN SERPL-MCNC: 4.1 G/DL (ref 3.4–5)
ALP SERPL-CCNC: 55 U/L (ref 40–150)
ALT SERPL W P-5'-P-CCNC: 16 U/L (ref 0–50)
ANION GAP SERPL CALCULATED.3IONS-SCNC: 4 MMOL/L (ref 3–14)
AST SERPL W P-5'-P-CCNC: 19 U/L (ref 0–45)
BILIRUB SERPL-MCNC: 0.7 MG/DL (ref 0.2–1.3)
BUN SERPL-MCNC: 10 MG/DL (ref 7–30)
CALCIUM SERPL-MCNC: 8.8 MG/DL (ref 8.5–10.1)
CHLORIDE SERPL-SCNC: 106 MMOL/L (ref 94–109)
CHOLEST SERPL-MCNC: 193 MG/DL
CO2 SERPL-SCNC: 28 MMOL/L (ref 20–32)
CREAT SERPL-MCNC: 0.81 MG/DL (ref 0.52–1.04)
GFR SERPL CREATININE-BSD FRML MDRD: 79 ML/MIN/{1.73_M2}
GLUCOSE SERPL-MCNC: 97 MG/DL (ref 70–99)
HDLC SERPL-MCNC: 101 MG/DL
LDLC SERPL CALC-MCNC: 80 MG/DL
NONHDLC SERPL-MCNC: 92 MG/DL
POTASSIUM SERPL-SCNC: 3.7 MMOL/L (ref 3.4–5.3)
PROT SERPL-MCNC: 7.5 G/DL (ref 6.8–8.8)
SODIUM SERPL-SCNC: 138 MMOL/L (ref 133–144)
TRIGL SERPL-MCNC: 62 MG/DL
TSH SERPL DL<=0.005 MIU/L-ACNC: 0.65 MU/L (ref 0.4–4)

## 2021-07-05 PROCEDURE — 36415 COLL VENOUS BLD VENIPUNCTURE: CPT | Performed by: FAMILY MEDICINE

## 2021-07-05 PROCEDURE — 80053 COMPREHEN METABOLIC PANEL: CPT | Performed by: FAMILY MEDICINE

## 2021-07-05 PROCEDURE — 84443 ASSAY THYROID STIM HORMONE: CPT | Performed by: FAMILY MEDICINE

## 2021-07-05 PROCEDURE — 80061 LIPID PANEL: CPT | Performed by: FAMILY MEDICINE
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
## 2021-07-05 NOTE — RESULT ENCOUNTER NOTE
Ms. Claros    Your recent test results are attached. Normal labs    If you have any questions or concerns please contact me via My Chart or call the clinic at [phone]     Thank You  Lindsay Padgett MD.

## 2021-07-06 ENCOUNTER — OFFICE VISIT (OUTPATIENT)
Dept: FAMILY MEDICINE | Facility: OTHER | Age: 61
End: 2021-07-06
Payer: COMMERCIAL

## 2021-07-06 VITALS
RESPIRATION RATE: 15 BRPM | HEART RATE: 67 BPM | DIASTOLIC BLOOD PRESSURE: 70 MMHG | TEMPERATURE: 98.8 F | BODY MASS INDEX: 19.76 KG/M2 | HEIGHT: 63 IN | WEIGHT: 111.5 LBS | SYSTOLIC BLOOD PRESSURE: 110 MMHG | OXYGEN SATURATION: 100 %

## 2021-07-06 DIAGNOSIS — Z00.00 ROUTINE GENERAL MEDICAL EXAMINATION AT A HEALTH CARE FACILITY: Primary | ICD-10-CM

## 2021-07-06 DIAGNOSIS — Z12.11 SCREEN FOR COLON CANCER: ICD-10-CM

## 2021-07-06 DIAGNOSIS — Z12.4 SCREENING FOR MALIGNANT NEOPLASM OF CERVIX: ICD-10-CM

## 2021-07-06 DIAGNOSIS — H40.033 NARROW ANGLE GLAUCOMA SUSPECT, BILATERAL: ICD-10-CM

## 2021-07-06 DIAGNOSIS — E03.9 HYPOTHYROIDISM, UNSPECIFIED TYPE: ICD-10-CM

## 2021-07-06 DIAGNOSIS — M85.80 OSTEOPENIA, UNSPECIFIED LOCATION: ICD-10-CM

## 2021-07-06 DIAGNOSIS — Z11.4 SCREENING FOR HIV (HUMAN IMMUNODEFICIENCY VIRUS): ICD-10-CM

## 2021-07-06 DIAGNOSIS — K57.32 DIVERTICULITIS OF COLON: ICD-10-CM

## 2021-07-06 DIAGNOSIS — Z12.31 VISIT FOR SCREENING MAMMOGRAM: ICD-10-CM

## 2021-07-06 DIAGNOSIS — N95.2 VAGINAL ATROPHY: ICD-10-CM

## 2021-07-06 DIAGNOSIS — M85.80 OSTEOPENIA OF PREMATURITY: ICD-10-CM

## 2021-07-06 PROCEDURE — G0124 SCREEN C/V THIN LAYER BY MD: HCPCS | Performed by: PATHOLOGY

## 2021-07-06 PROCEDURE — 99396 PREV VISIT EST AGE 40-64: CPT | Performed by: FAMILY MEDICINE

## 2021-07-06 PROCEDURE — G0145 SCR C/V CYTO,THINLAYER,RESCR: HCPCS | Performed by: FAMILY MEDICINE

## 2021-07-06 PROCEDURE — 87624 HPV HI-RISK TYP POOLED RSLT: CPT | Performed by: FAMILY MEDICINE

## 2021-07-06 ASSESSMENT — ENCOUNTER SYMPTOMS
NERVOUS/ANXIOUS: 1
WEAKNESS: 0
DIZZINESS: 1
SHORTNESS OF BREATH: 0
ARTHRALGIAS: 0
HEADACHES: 0
HEMATOCHEZIA: 0
DIARRHEA: 0
FEVER: 0
HEARTBURN: 0
CONSTIPATION: 0
JOINT SWELLING: 0
SORE THROAT: 0
BREAST MASS: 0
MYALGIAS: 0
HEMATURIA: 0
NAUSEA: 0
COUGH: 0
DYSURIA: 0
PARESTHESIAS: 0
PALPITATIONS: 0
EYE PAIN: 0
FREQUENCY: 0
CHILLS: 0
ABDOMINAL PAIN: 0

## 2021-07-06 ASSESSMENT — MIFFLIN-ST. JEOR: SCORE: 1044.76

## 2021-07-06 NOTE — ASSESSMENT & PLAN NOTE
Has history of recurrent diverticulitis. Would like to have augmentin on hand if she develops symptoms. Has not needed this in more than 3 yrs. Advised sparing use and notify clinic when she has symptoms

## 2021-07-06 NOTE — ASSESSMENT & PLAN NOTE
Reviewed last TSH which is the normal range. Continue current dose of levothyroxine. Refill as needed

## 2021-07-10 ENCOUNTER — DOCUMENTATION ONLY (OUTPATIENT)
Dept: OTHER | Facility: CLINIC | Age: 61
End: 2021-07-10

## 2021-07-11 ENCOUNTER — HOSPITAL ENCOUNTER (OUTPATIENT)
Facility: CLINIC | Age: 61
Discharge: HOME | End: 2021-07-11
Admitting: FAMILY MEDICINE
Payer: COMMERCIAL

## 2021-07-14 LAB
COPATH REPORT: ABNORMAL
PAP: ABNORMAL

## 2021-07-15 PROBLEM — R87.610 ASCUS OF CERVIX WITH NEGATIVE HIGH RISK HPV: Status: ACTIVE | Noted: 2021-07-06

## 2021-07-21 ENCOUNTER — HOSPITAL ENCOUNTER (OUTPATIENT)
Dept: BONE DENSITY | Facility: CLINIC | Age: 61
End: 2021-07-21
Attending: FAMILY MEDICINE
Payer: COMMERCIAL

## 2021-07-21 ENCOUNTER — HOSPITAL ENCOUNTER (OUTPATIENT)
Dept: MAMMOGRAPHY | Facility: CLINIC | Age: 61
End: 2021-07-21
Attending: FAMILY MEDICINE
Payer: COMMERCIAL

## 2021-07-21 DIAGNOSIS — Z12.31 VISIT FOR SCREENING MAMMOGRAM: ICD-10-CM

## 2021-07-21 DIAGNOSIS — M85.80 OSTEOPENIA, UNSPECIFIED LOCATION: ICD-10-CM

## 2021-07-21 PROCEDURE — 77063 BREAST TOMOSYNTHESIS BI: CPT

## 2021-07-21 PROCEDURE — 77080 DXA BONE DENSITY AXIAL: CPT

## 2021-08-03 ENCOUNTER — DOCUMENTATION ONLY (OUTPATIENT)
Dept: OTHER | Facility: CLINIC | Age: 61
End: 2021-08-03

## 2021-08-03 PROBLEM — Z71.89 ADVANCED DIRECTIVES, COUNSELING/DISCUSSION: Chronic | Status: RESOLVED | Noted: 2017-07-31 | Resolved: 2021-08-03

## 2021-08-13 ENCOUNTER — IMMUNIZATION (OUTPATIENT)
Dept: FAMILY MEDICINE | Facility: CLINIC | Age: 61
End: 2021-08-13
Payer: COMMERCIAL

## 2021-08-13 PROCEDURE — 91301 PR COVID VAC MODERNA 100 MCG/0.5 ML IM: CPT

## 2021-08-13 PROCEDURE — 0011A PR COVID VAC MODERNA 100 MCG/0.5 ML IM: CPT

## 2021-09-10 ENCOUNTER — IMMUNIZATION (OUTPATIENT)
Dept: FAMILY MEDICINE | Facility: CLINIC | Age: 61
End: 2021-09-10
Attending: FAMILY MEDICINE
Payer: COMMERCIAL

## 2021-09-10 PROCEDURE — 91301 PR COVID VAC MODERNA 100 MCG/0.5 ML IM: CPT

## 2021-09-10 PROCEDURE — 0012A PR COVID VAC MODERNA 100 MCG/0.5 ML IM: CPT

## 2021-10-11 ENCOUNTER — MYC REFILL (OUTPATIENT)
Dept: FAMILY MEDICINE | Facility: OTHER | Age: 61
End: 2021-10-11

## 2021-10-11 DIAGNOSIS — F41.9 ANXIETY: ICD-10-CM

## 2021-10-12 NOTE — TELEPHONE ENCOUNTER
Pending Prescriptions:                       Disp   Refills    ALPRAZolam (XANAX) 0.25 MG tablet          20 tab*0        Sig: Take 1-2 tablets (0.25-0.5 mg) by mouth No more than           1 pill daily.    Routing refill request to provider for review/approval because:  Drug not on the [facility] refill protocol     ALPRAZolam (XANAX) 0.25 MG tablet 20 tablet 0 11/5/2020  No   Sig - Route: Take 1-2 tablets (0.25-0.5 mg) by mouth as needed for anxiety No more than 1 pill daily. - Oral   Sent to pharmacy as: ALPRAZolam 0.25 MG Oral Tablet (XANAX)   Class: E-Prescribe   Order: [id number]   E-Prescribing Status: Receipt confirmed by pharmacy (11/5/2020 11:04 AM CST)

## 2021-10-13 RX ORDER — ALPRAZOLAM 0.25 MG
.25-.5 TABLET ORAL
Qty: 20 TABLET | Refills: 0 | Status: SHIPPED | OUTPATIENT
Start: 2021-10-13 | End: 2022-10-21

## 2021-10-23 ENCOUNTER — HEALTH MAINTENANCE LETTER (OUTPATIENT)
Age: 61
End: 2021-10-23

## 2022-01-08 ENCOUNTER — MYC REFILL (OUTPATIENT)
Dept: FAMILY MEDICINE | Facility: OTHER | Age: 62
End: 2022-01-08
Payer: COMMERCIAL

## 2022-01-08 DIAGNOSIS — K57.32 DIVERTICULITIS OF COLON: ICD-10-CM

## 2022-01-11 NOTE — TELEPHONE ENCOUNTER
Pending Prescriptions:                       Disp   Refills    amoxicillin-clavulanate (AUGMENTIN) 875-12*20 tab*0        Sig: Take 1 tablet by mouth 2 times daily For           diverticulitis flare up        Routing refill request to provider for review/approval because:  Drug not on the [facility] [place]     YAMILETH CarrilloN, RN, PHN  [place]  January 11, 2022

## 2022-02-12 ENCOUNTER — MYC MEDICAL ADVICE (OUTPATIENT)
Dept: FAMILY MEDICINE | Facility: OTHER | Age: 62
End: 2022-02-12
Payer: COMMERCIAL

## 2022-02-12 DIAGNOSIS — N95.2 ATROPHIC VAGINITIS: ICD-10-CM

## 2022-02-15 RX ORDER — ESTRADIOL 0.1 MG/G
2 CREAM VAGINAL
Qty: 42.5 G | Refills: 4 | Status: SHIPPED | OUTPATIENT
Start: 2022-02-16 | End: 2022-10-24

## 2022-05-02 ENCOUNTER — TELEPHONE (OUTPATIENT)
Dept: FAMILY MEDICINE | Facility: OTHER | Age: 62
End: 2022-05-02
Payer: COMMERCIAL

## 2022-05-02 DIAGNOSIS — E03.9 HYPOTHYROIDISM, UNSPECIFIED TYPE: Primary | ICD-10-CM

## 2022-05-02 DIAGNOSIS — E03.9 HYPOTHYROIDISM, UNSPECIFIED TYPE: ICD-10-CM

## 2022-05-02 NOTE — TELEPHONE ENCOUNTER
Reason for Call: Request for an order or referral:    Order or referral being requested: Annual physical labwork    Date needed: at your convenience    Has the patient been seen by the PCP for this problem? YES    Additional comments: Umm is scheduled for her annual physical 7/11/22 and would like to do labwork prior to the appointment. Please place orders and let her know when that has been done.    Phone number Patient can be reached at:  Cell number on file:    Telephone Information:   Mobile [phone]       Best Time:  Anytime    Can we leave a detailed message on this number?  YES    Call taken on 5/2/2022 at 12:21 PM by Kelly Hernandez

## 2022-05-03 RX ORDER — LEVOTHYROXINE SODIUM 88 UG/1
88 TABLET ORAL DAILY
Qty: 90 TABLET | Refills: 0 | Status: SHIPPED | OUTPATIENT
Start: 2022-05-03 | End: 2022-07-11
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
## 2022-07-04 ASSESSMENT — ENCOUNTER SYMPTOMS
HEARTBURN: 0
CHILLS: 0
NAUSEA: 0
COUGH: 0
DIZZINESS: 0
SORE THROAT: 0
FREQUENCY: 0
WEAKNESS: 0
JOINT SWELLING: 0
HEADACHES: 0
DIARRHEA: 0
HEMATOCHEZIA: 0
PALPITATIONS: 0
BREAST MASS: 0
SHORTNESS OF BREATH: 0
CONSTIPATION: 1
FEVER: 0
ABDOMINAL PAIN: 0
HEMATURIA: 0
DYSURIA: 0
NERVOUS/ANXIOUS: 0
EYE PAIN: 0
PARESTHESIAS: 0
ARTHRALGIAS: 0
MYALGIAS: 0

## 2022-07-07 ENCOUNTER — LAB (OUTPATIENT)
Dept: LAB | Facility: CLINIC | Age: 62
End: 2022-07-07
Payer: COMMERCIAL

## 2022-07-07 DIAGNOSIS — E03.9 HYPOTHYROIDISM, UNSPECIFIED TYPE: ICD-10-CM

## 2022-07-07 LAB
ALBUMIN SERPL-MCNC: 3.7 G/DL (ref 3.4–5)
ALP SERPL-CCNC: 56 U/L (ref 40–150)
ALT SERPL W P-5'-P-CCNC: 16 U/L (ref 0–50)
ANION GAP SERPL CALCULATED.3IONS-SCNC: 5 MMOL/L (ref 3–14)
AST SERPL W P-5'-P-CCNC: 13 U/L (ref 0–45)
BILIRUB SERPL-MCNC: 0.8 MG/DL (ref 0.2–1.3)
BUN SERPL-MCNC: 11 MG/DL (ref 7–30)
CALCIUM SERPL-MCNC: 8.5 MG/DL (ref 8.5–10.1)
CHLORIDE BLD-SCNC: 108 MMOL/L (ref 94–109)
CHOLEST SERPL-MCNC: 174 MG/DL
CO2 SERPL-SCNC: 28 MMOL/L (ref 20–32)
CREAT SERPL-MCNC: 0.79 MG/DL (ref 0.52–1.04)
FASTING STATUS PATIENT QL REPORTED: YES
GFR SERPL CREATININE-BSD FRML MDRD: 85 ML/MIN/1.73M2
GLUCOSE BLD-MCNC: 98 MG/DL (ref 70–99)
HDLC SERPL-MCNC: 82 MG/DL
LDLC SERPL CALC-MCNC: 78 MG/DL
NONHDLC SERPL-MCNC: 92 MG/DL
POTASSIUM BLD-SCNC: 3.9 MMOL/L (ref 3.4–5.3)
PROT SERPL-MCNC: 7.1 G/DL (ref 6.8–8.8)
SODIUM SERPL-SCNC: 141 MMOL/L (ref 133–144)
T4 FREE SERPL-MCNC: 1.36 NG/DL (ref 0.76–1.46)
TRIGL SERPL-MCNC: 71 MG/DL
TSH SERPL DL<=0.005 MIU/L-ACNC: 0.36 MU/L (ref 0.4–4)

## 2022-07-07 PROCEDURE — 80053 COMPREHEN METABOLIC PANEL: CPT

## 2022-07-07 PROCEDURE — 84443 ASSAY THYROID STIM HORMONE: CPT

## 2022-07-07 PROCEDURE — 84439 ASSAY OF FREE THYROXINE: CPT

## 2022-07-07 PROCEDURE — 36415 COLL VENOUS BLD VENIPUNCTURE: CPT

## 2022-07-07 PROCEDURE — 80061 LIPID PANEL: CPT

## 2022-07-11 ENCOUNTER — OFFICE VISIT (OUTPATIENT)
Dept: FAMILY MEDICINE | Facility: OTHER | Age: 62
End: 2022-07-11
Payer: COMMERCIAL

## 2022-07-11 VITALS
OXYGEN SATURATION: 99 % | WEIGHT: 107.5 LBS | TEMPERATURE: 98.3 F | BODY MASS INDEX: 19.05 KG/M2 | SYSTOLIC BLOOD PRESSURE: 82 MMHG | HEART RATE: 82 BPM | DIASTOLIC BLOOD PRESSURE: 54 MMHG | HEIGHT: 63 IN

## 2022-07-11 DIAGNOSIS — H26.9 CATARACT OF BOTH EYES, UNSPECIFIED CATARACT TYPE: ICD-10-CM

## 2022-07-11 DIAGNOSIS — Z00.00 ROUTINE HISTORY AND PHYSICAL EXAMINATION OF ADULT: Primary | ICD-10-CM

## 2022-07-11 DIAGNOSIS — Z01.818 PRE-OP EVALUATION: ICD-10-CM

## 2022-07-11 DIAGNOSIS — Z12.11 SCREEN FOR COLON CANCER: ICD-10-CM

## 2022-07-11 DIAGNOSIS — Z12.31 ENCOUNTER FOR SCREENING MAMMOGRAM FOR BREAST CANCER: ICD-10-CM

## 2022-07-11 DIAGNOSIS — E03.9 HYPOTHYROIDISM, UNSPECIFIED TYPE: ICD-10-CM

## 2022-07-11 DIAGNOSIS — H40.053 BORDERLINE GLAUCOMA WITH OCULAR HYPERTENSION, BILATERAL: ICD-10-CM

## 2022-07-11 PROBLEM — R10.9 ABDOMINAL CRAMPING: Status: RESOLVED | Noted: 2018-11-06 | Resolved: 2022-07-11

## 2022-07-11 PROBLEM — M65.4 DE QUERVAIN'S DISEASE (TENOSYNOVITIS): Status: RESOLVED | Noted: 2017-11-06 | Resolved: 2022-07-11

## 2022-07-11 PROCEDURE — 99396 PREV VISIT EST AGE 40-64: CPT | Performed by: FAMILY MEDICINE

## 2022-07-11 PROCEDURE — 99214 OFFICE O/P EST MOD 30 MIN: CPT | Mod: 25 | Performed by: FAMILY MEDICINE

## 2022-07-11 RX ORDER — BRIMONIDINE TARTRATE 2 MG/ML
1 SOLUTION/ DROPS OPHTHALMIC 2 TIMES DAILY
COMMUNITY
Start: 2022-04-11

## 2022-07-11 RX ORDER — LEVOTHYROXINE SODIUM 75 UG/1
75 TABLET ORAL DAILY
Qty: 90 TABLET | Refills: 0 | Status: SHIPPED | OUTPATIENT
Start: 2022-07-11 | End: 2022-10-17

## 2022-07-11 RX ORDER — DORZOLAMIDE HYDROCHLORIDE AND TIMOLOL MALEATE 20; 5 MG/ML; MG/ML
1 SOLUTION/ DROPS OPHTHALMIC 2 TIMES DAILY
COMMUNITY
Start: 2022-07-11

## 2022-07-11 ASSESSMENT — ENCOUNTER SYMPTOMS
CONSTIPATION: 1
SHORTNESS OF BREATH: 0
MYALGIAS: 0
SORE THROAT: 0
EYE PAIN: 0
FEVER: 0
HEADACHES: 0
HEMATOCHEZIA: 0
HEMATURIA: 0
DIZZINESS: 0
ARTHRALGIAS: 0
DIARRHEA: 0
HEARTBURN: 0
ABDOMINAL PAIN: 0
WEAKNESS: 0
NERVOUS/ANXIOUS: 0
PALPITATIONS: 0
CHILLS: 0
DYSURIA: 0
NAUSEA: 0
COUGH: 0
BREAST MASS: 0
JOINT SWELLING: 0
FREQUENCY: 0
PARESTHESIAS: 0

## 2022-07-11 ASSESSMENT — PAIN SCALES - GENERAL: PAINLEVEL: NO PAIN (0)

## 2022-07-11 NOTE — PROGRESS NOTES
[address]  [facility] [address]  Phone: [phone]  Primary Provider: Vern Padgett  Pre-op Performing Provider: VERN PADGETT    PREOPERATIVE EVALUATION:  Today's date: 7/11/2022    Umm Claros is a 61 year old female who presents for a preoperative evaluation.    Surgical Information:  Surgery/Procedure: Cataract removal w/canaloplasty   [facility]  Surgeon: Dr.Tom Rao    Surgery Date: 8/8/2022 and 8/22/2022  Time of Surgery: TBD  Where patient plans to recover: At home with family  Fax number for surgical facility: [phone]    Type of Anesthesia Anticipated: to be determined    Assessment & Plan     The proposed surgical procedure is considered LOW risk.    1. Pre-op evaluation  2. Borderline glaucoma with ocular hypertension, bilateral  3. Cataract of both eyes, unspecified cataract type      Risks and Recommendations:  The patient has the following additional risks and recommendations for perioperative complications:   - No identified additional risk factors other than previously addressed    Medication Instructions:  Patient is to take all scheduled medications on the day of surgery    RECOMMENDATION:  APPROVAL GIVEN to proceed with proposed procedure, without further diagnostic evaluation.        Subjective     HPI related to upcoming procedure: Patient with history of glaucoma is scheduled for cataract removal and canaloplasty . Here for preop eval.       Preop Questions 7/11/2022   1. Have you ever had a heart attack or stroke? No   3. Do you have chest pain with activity? No   4. Do you have a history of  heart failure? No   5. Do you currently have a cold, bronchitis or symptoms of other infection? No   6. Do you have a cough, shortness of breath, or wheezing? No   7. Do you or anyone in your family have previous history of blood clots? No   8. Do you or does anyone in your family have  a serious bleeding problem such as prolonged bleeding following surgeries or cuts? No   9. Have you ever had problems with anemia or been told to take iron pills? No   10. Have you had any abnormal blood loss such as black, tarry or bloody stools, or abnormal vaginal bleeding? No   11. Have you ever had a blood transfusion? No   13. Have you or any of your relatives ever had problems with anesthesia? No   14. Do you have sleep apnea, excessive snoring or daytime drowsiness? No   15. Do you have any artifical heart valves or other implanted medical devices like a pacemaker, defibrillator, or continuous glucose monitor? No   16. Do you have artificial joints? No   18. Is there any chance that you may be pregnant? No     Health Care Directive:  Patient has a Health Care Directive on file      Preoperative Review of :   reviewed - no record of controlled substances prescribed.      Status of Chronic Conditions:  See problem list for active medical problems.  Problems all longstanding and stable, except as noted/documented.  See ROS for pertinent symptoms related to these conditions.    HYPOTHYROIDISM - Patient has a longstanding history of chronic Hypothyroidism. Patient has been doing well, noting no tremor, insomnia, hair loss or changes in skin texture. Continues to take medications as directed, without adverse reactions or side effects. Last TSH   Lab Results   Component Value Date    TSH 0.36 (L) 07/07/2022   .        Review of Systems  CONSTITUTIONAL: NEGATIVE for fever, chills, change in weight  INTEGUMENTARY/SKIN: NEGATIVE for worrisome rashes, moles or lesions  EYES: NEGATIVE for vision changes or irritation  ENT/MOUTH: NEGATIVE for ear, mouth and throat problems  RESP: NEGATIVE for significant cough or SOB  CV: NEGATIVE for chest pain, palpitations or peripheral edema  GI: NEGATIVE for nausea, abdominal pain, heartburn, or change in bowel habits  : NEGATIVE for frequency, dysuria, or  hematuria  MUSCULOSKELETAL: NEGATIVE for significant arthralgias or myalgia  NEURO: NEGATIVE for weakness, dizziness or paresthesias  ENDOCRINE: NEGATIVE for temperature intolerance, skin/hair changes  HEME: NEGATIVE for bleeding problems  PSYCHIATRIC: NEGATIVE for changes in mood or affect    Patient Active Problem List    Diagnosis Date Noted     ASCUS of cervix with negative high risk HPV 07/06/2021     Priority: Medium     2/26/18 NIL Pap, Neg HR HPV.   7/6/21 ASCUS pap, neg HR HPV. Plan: cotest in 3 years.         Abdominal cramping 11/06/2018     Priority: Medium     Vaginal atrophy 02/26/2018     Priority: Medium     Narrow angle glaucoma suspect, bilateral 01/04/2018     Priority: Medium     De Quervain's disease (tenosynovitis) 11/06/2017     Priority: Medium     Diverticulitis of colon 02/23/2017     Priority: Medium     Chronic constipation 03/14/2016     Priority: Medium     Osteopenia of prematurity 11/09/2012     Priority: Medium     Intradermal nevus 08/08/2011     Priority: Medium     right side of neck       Hypothyroidism 10/03/2005     Priority: Medium      Past Medical History:   Diagnosis Date     Anxiety state, unspecified      ASCUS of cervix with negative high risk HPV 7/6/2021     Depressive disorder, not elsewhere classified     depression     Raynaud's syndrome      Tobacco use disorder      Unspecified hypothyroidism      Past Surgical History:   Procedure Laterality Date     COLONOSCOPY  4/10/2012    Procedure:COLONOSCOPY; Colonoscopy; Surgeon:BELL LAYTON; Location: GI     HC REMOVAL OF OVARY/TUBE(S)  01/30/01    Laparoscopic left sided salpingo-oophorectomy. Cystoscopy     HC REMOVE TONSILS/ADENOIDS,<13 Y/O      T & A <12y.o.     Z APPENDECTOMY  05/26/00    Laparoscopic drainage of left ovarian cyst and lysis of adhesions, dilatation and curettage, and laparoscopic appendectomy performed by Dr. Ryan     Current Outpatient Medications   Medication Sig Dispense Refill      Acetaminophen (TYLENOL PO)        ALPRAZolam (XANAX) 0.25 MG tablet Take 1-2 tablets (0.25-0.5 mg) by mouth nightly as needed for anxiety No more than 1 pill daily. 20 tablet 0     CALCIUM /VITAMIN D TABS   OR 1 TABLET DAILY 0 0     estradiol (ESTRACE) 0.1 MG/GM vaginal cream Place 2 g vaginally three times a week 42.5 g 4     IBUPROFEN PO Take 200 mg by mouth as needed.       latanoprost (XALATAN) 0.005 % ophthalmic solution daily  1     levothyroxine (SYNTHROID/LEVOTHROID) 88 MCG tablet Take 1 tablet (88 mcg) by mouth daily 90 tablet 0     magnesium hydroxide (MILK OF MAGNESIA) 400 MG/5ML suspension Take 30-60 mLs by mouth daily as needed for constipation. 360 mL 1     VITAMIN C TABS 500 MG OR 1 TABLET TWICE DAILY 0 0     vitamin E 400 UNIT capsule Two times weekly 30 capsule 0     amoxicillin-clavulanate (AUGMENTIN) 875-125 MG tablet Take 1 tablet by mouth 2 times daily For diverticulitis flare up (Patient not taking: Reported on 2022) 20 tablet 0     aspirin 81 MG EC tablet Take 81 mg by mouth daily.       docusate sodium 100 MG tablet Take 100 mg by mouth daily. (Patient not taking: Reported on 2022) 60 tablet 1     fish oil-omega-3 fatty acids 1000 MG capsule Take 1 g by mouth daily. (Patient not taking: Reported on 2022)       Magnesium 250 MG tablet Take 1 tablet by mouth daily. (Patient not taking: Reported on 2022)       MULTIPLE VITAMIN CAPS   OR daily (Patient not taking: Reported on 2022)  0       Allergies   Allergen Reactions     No Known Allergies      Tramadol      Other reaction(s): Other - Describe In Comment Field  Dizzy and double vision        Social History     Tobacco Use     Smoking status: Former Smoker     Years: 7.00     Quit date: 1993     Years since quittin.2     Smokeless tobacco: Never Used   Substance Use Topics     Alcohol use: Yes     Comment: 3-5 drinks per week     Family History   Problem Relation Age of Onset     Cancer Paternal Grandmother   "       breast cancer     Gynecology Mother         endometriosis     Cancer Father         prostate cancer     Alcohol/Drug Father         alcohol problems and smoker     History   Drug Use No         Objective     BP (!) 82/54 (Cuff Size: Adult Regular)   Pulse 82   Temp 98.3  F (36.8  C) (Oral)   Ht 1.595 m (5' 2.8\")   Wt 48.8 kg (107 lb 8 oz)   LMP 04/13/2001   SpO2 99%   BMI 19.17 kg/m      Physical Exam    GENERAL APPEARANCE: healthy, alert and no distress     EYES: EOMI, PERRL     HENT: ear canals and TM's normal and nose and mouth without ulcers or lesions     NECK: no adenopathy, no asymmetry, masses, or scars and thyroid normal to palpation     RESP: lungs clear to auscultation - no rales, rhonchi or wheezes     CV: regular rates and rhythm, normal S1 S2, no S3 or S4 and no murmur, click or rub     ABDOMEN:  soft, nontender, no HSM or masses and bowel sounds normal     MS: extremities normal- no gross deformities noted, no evidence of inflammation in joints, FROM in all extremities.     SKIN: no suspicious lesions or rashes     NEURO: Normal strength and tone, sensory exam grossly normal, mentation intact and speech normal     PSYCH: mentation appears normal. and affect normal/bright     LYMPHATICS: No cervical adenopathy    Recent Labs   Lab Test 07/07/22  0926 07/05/21  0954    138   POTASSIUM 3.9 3.7   CR 0.79 0.81        Diagnostics:  No labs were ordered during this visit.   No EKG required for low risk surgery (cataract, skin procedure, breast biopsy, etc).    Revised Cardiac Risk Index (RCRI):  The patient has the following serious cardiovascular risks for perioperative complications:   - No serious cardiac risks = 0 points     RCRI Interpretation: 0 points: Class I (very low risk - 0.4% complication rate)      Signed Electronically by: Lindsay Padgett MD  Copy of this evaluation report is provided to requesting physician.      "
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
## 2022-07-26 LAB — NONINV COLON CA DNA+OCC BLD SCRN STL QL: NEGATIVE

## 2022-07-26 NOTE — RESULT ENCOUNTER NOTE
Ms. Claros    Your recent test results are attached.  Stool test is negative showing no concerns for colon cancer.  Needs recheck in 3 years for follow-up    If you have any questions or concerns please contact me via My Chart or call the clinic at [phone]     Thank You  Lindsay Padgett MD.

## 2022-07-29 ENCOUNTER — HOSPITAL ENCOUNTER (OUTPATIENT)
Dept: MAMMOGRAPHY | Facility: CLINIC | Age: 62
Discharge: HOME | End: 2022-07-29
Attending: FAMILY MEDICINE | Admitting: FAMILY MEDICINE
Payer: COMMERCIAL

## 2022-07-29 DIAGNOSIS — Z12.31 ENCOUNTER FOR SCREENING MAMMOGRAM FOR BREAST CANCER: ICD-10-CM

## 2022-07-29 PROCEDURE — 77067 SCR MAMMO BI INCL CAD: CPT

## 2022-09-09 ENCOUNTER — LAB (OUTPATIENT)
Dept: LAB | Facility: CLINIC | Age: 62
End: 2022-09-09
Payer: COMMERCIAL

## 2022-09-09 DIAGNOSIS — E03.9 HYPOTHYROIDISM, UNSPECIFIED TYPE: ICD-10-CM

## 2022-09-09 LAB — TSH SERPL DL<=0.005 MIU/L-ACNC: 1.89 MU/L (ref 0.4–4)

## 2022-09-09 PROCEDURE — 84443 ASSAY THYROID STIM HORMONE: CPT

## 2022-09-09 PROCEDURE — 36415 COLL VENOUS BLD VENIPUNCTURE: CPT

## 2022-10-09 ENCOUNTER — HEALTH MAINTENANCE LETTER (OUTPATIENT)
Age: 62
End: 2022-10-09

## 2022-10-11 DIAGNOSIS — E03.9 HYPOTHYROIDISM, UNSPECIFIED TYPE: ICD-10-CM

## 2022-10-13 ENCOUNTER — MYC REFILL (OUTPATIENT)
Dept: FAMILY MEDICINE | Facility: OTHER | Age: 62
End: 2022-10-13

## 2022-10-13 DIAGNOSIS — E03.9 HYPOTHYROIDISM, UNSPECIFIED TYPE: ICD-10-CM

## 2022-10-17 RX ORDER — LEVOTHYROXINE SODIUM 75 UG/1
75 TABLET ORAL DAILY
Qty: 90 TABLET | Refills: 0 | OUTPATIENT
Start: 2022-10-17

## 2022-10-17 RX ORDER — LEVOTHYROXINE SODIUM 75 UG/1
TABLET ORAL
Qty: 90 TABLET | Refills: 2 | Status: SHIPPED | OUTPATIENT
Start: 2022-10-17 | End: 2023-05-04

## 2022-10-17 NOTE — TELEPHONE ENCOUNTER
Prescription approved per [facility] Refill Protocol.  Dominique Anne RN on 10/17/2022 at 11:02 AM

## 2022-10-21 ENCOUNTER — MYC REFILL (OUTPATIENT)
Dept: FAMILY MEDICINE | Facility: OTHER | Age: 62
End: 2022-10-21

## 2022-10-21 DIAGNOSIS — N95.2 ATROPHIC VAGINITIS: ICD-10-CM

## 2022-10-21 DIAGNOSIS — F41.9 ANXIETY: ICD-10-CM

## 2022-10-24 RX ORDER — ESTRADIOL 0.1 MG/G
CREAM VAGINAL
Qty: 43 G | Refills: 0 | Status: SHIPPED | OUTPATIENT
Start: 2022-10-24 | End: 2022-12-26

## 2022-10-25 ENCOUNTER — TELEPHONE (OUTPATIENT)
Dept: FAMILY MEDICINE | Facility: OTHER | Age: 62
End: 2022-10-25

## 2022-10-25 DIAGNOSIS — F41.9 ANXIETY: ICD-10-CM

## 2022-10-25 RX ORDER — ALPRAZOLAM 0.25 MG
.25-.5 TABLET ORAL
Qty: 20 TABLET | Refills: 0 | Status: SHIPPED | OUTPATIENT
Start: 2022-10-25 | End: 2022-10-27

## 2022-10-25 NOTE — TELEPHONE ENCOUNTER
"Pharmacy requesting Rx clarification for ALPRAZolam (XANAX) 0.25 MG tablet. \"Max of 1 or 2 tablets daily?\"  "

## 2022-10-26 NOTE — TELEPHONE ENCOUNTER
"Please clarify Alprazolam dosing for pharmacy.  Is it to be \"no more than 1 pill daily\" or up to 2 tabs nightly prn?  Jayne FLEMING RN  "

## 2022-10-27 RX ORDER — ALPRAZOLAM 0.25 MG
.25-.5 TABLET ORAL
Qty: 20 TABLET | Refills: 0 | Status: SHIPPED | OUTPATIENT
Start: 2022-10-27

## 2022-12-26 ENCOUNTER — MYC REFILL (OUTPATIENT)
Dept: FAMILY MEDICINE | Facility: OTHER | Age: 62
End: 2022-12-26

## 2022-12-26 DIAGNOSIS — N95.2 ATROPHIC VAGINITIS: ICD-10-CM

## 2022-12-27 RX ORDER — ESTRADIOL 0.1 MG/G
CREAM VAGINAL
Qty: 42.5 G | Refills: 0 | Status: SHIPPED | OUTPATIENT
Start: 2022-12-27 | End: 2023-02-22

## 2023-02-20 ENCOUNTER — MYC REFILL (OUTPATIENT)
Dept: FAMILY MEDICINE | Facility: OTHER | Age: 63
End: 2023-02-20
Payer: COMMERCIAL

## 2023-02-20 DIAGNOSIS — N95.2 ATROPHIC VAGINITIS: ICD-10-CM

## 2023-02-22 RX ORDER — ESTRADIOL 0.1 MG/G
CREAM VAGINAL
Qty: 42.5 G | Refills: 0 | OUTPATIENT
Start: 2023-02-22

## 2023-02-22 RX ORDER — ESTRADIOL 0.1 MG/G
CREAM VAGINAL
Qty: 43 G | Refills: 0 | Status: SHIPPED | OUTPATIENT
Start: 2023-02-22 | End: 2023-02-24

## 2023-02-24 ENCOUNTER — TELEPHONE (OUTPATIENT)
Dept: FAMILY MEDICINE | Facility: OTHER | Age: 63
End: 2023-02-24
Payer: COMMERCIAL

## 2023-02-24 DIAGNOSIS — N95.2 ATROPHIC VAGINITIS: ICD-10-CM

## 2023-02-24 RX ORDER — ESTRADIOL 0.1 MG/G
CREAM VAGINAL
Qty: 43 G | Refills: 11 | Status: SHIPPED | OUTPATIENT
Start: 2023-02-24

## 2023-02-24 NOTE — TELEPHONE ENCOUNTER
Patient is going to  Estrace vaginal cream this evening. She is wondering if Rx could be sent with refills next time so patient does not have to call every month.     If appropriate, can future refills be sent?

## 2023-05-02 ENCOUNTER — TRANSFERRED RECORDS (OUTPATIENT)
Dept: HEALTH INFORMATION MANAGEMENT | Facility: CLINIC | Age: 63
End: 2023-05-02
Payer: COMMERCIAL

## 2023-05-04 DIAGNOSIS — K57.32 DIVERTICULITIS OF COLON: Primary | ICD-10-CM

## 2023-05-04 DIAGNOSIS — E03.9 HYPOTHYROIDISM, UNSPECIFIED TYPE: ICD-10-CM

## 2023-05-04 NOTE — TELEPHONE ENCOUNTER
Patient is asking if provider will prescribe amoxicillin-clavulanate (AUGMENTIN) 875-125 MG tablet.  [place] and would like to have it in case her diverticulosis flares up.       Patient would like a callback if provider is able to sent to pharmacy.

## 2023-05-05 RX ORDER — LEVOTHYROXINE SODIUM 75 UG/1
75 TABLET ORAL DAILY
Qty: 90 TABLET | Refills: 3 | Status: SHIPPED | OUTPATIENT
Start: 2023-05-05

## 2023-06-12 ENCOUNTER — PATIENT OUTREACH (OUTPATIENT)
Dept: CARE COORDINATION | Facility: CLINIC | Age: 63
End: 2023-06-12
Payer: COMMERCIAL

## 2023-08-19 ENCOUNTER — HEALTH MAINTENANCE LETTER (OUTPATIENT)
Age: 63
End: 2023-08-19

## 2024-10-12 ENCOUNTER — HEALTH MAINTENANCE LETTER (OUTPATIENT)
Age: 64
End: 2024-10-12

## 2025-07-26 ENCOUNTER — HEALTH MAINTENANCE LETTER (OUTPATIENT)
Age: 65
End: 2025-07-26